# Patient Record
Sex: FEMALE | Race: WHITE | NOT HISPANIC OR LATINO | Employment: OTHER | URBAN - NONMETROPOLITAN AREA
[De-identification: names, ages, dates, MRNs, and addresses within clinical notes are randomized per-mention and may not be internally consistent; named-entity substitution may affect disease eponyms.]

---

## 2017-01-11 RX ORDER — LISINOPRIL AND HYDROCHLOROTHIAZIDE 12.5; 1 MG/1; MG/1
TABLET ORAL
Qty: 90 TABLET | Refills: 2 | Status: SHIPPED | OUTPATIENT
Start: 2017-01-11 | End: 2019-11-12

## 2017-03-08 ENCOUNTER — OFFICE VISIT (OUTPATIENT)
Dept: CARDIOLOGY | Facility: CLINIC | Age: 67
End: 2017-03-08

## 2017-03-08 VITALS
BODY MASS INDEX: 31.85 KG/M2 | HEART RATE: 64 BPM | SYSTOLIC BLOOD PRESSURE: 110 MMHG | DIASTOLIC BLOOD PRESSURE: 76 MMHG | HEIGHT: 59 IN | WEIGHT: 158 LBS

## 2017-03-08 DIAGNOSIS — I10 ESSENTIAL HYPERTENSION: ICD-10-CM

## 2017-03-08 DIAGNOSIS — I25.10 ATHEROSCLEROSIS OF NATIVE CORONARY ARTERY OF NATIVE HEART WITHOUT ANGINA PECTORIS: Primary | ICD-10-CM

## 2017-03-08 DIAGNOSIS — I25.10 CORONARY ARTERY DISEASE INVOLVING NATIVE CORONARY ARTERY OF NATIVE HEART WITHOUT ANGINA PECTORIS: ICD-10-CM

## 2017-03-08 PROCEDURE — 99214 OFFICE O/P EST MOD 30 MIN: CPT | Performed by: INTERNAL MEDICINE

## 2017-03-08 PROCEDURE — 93000 ELECTROCARDIOGRAM COMPLETE: CPT | Performed by: INTERNAL MEDICINE

## 2017-03-08 RX ORDER — CYANOCOBALAMIN (VITAMIN B-12) 1000 MCG
1 TABLET, SUBLINGUAL SUBLINGUAL DAILY
COMMUNITY
End: 2021-01-08 | Stop reason: ALTCHOICE

## 2017-03-08 NOTE — PROGRESS NOTES
vAe Ramirez  67 y.o. female    03/08/2017  1. Atherosclerosis of native coronary artery of native heart without angina pectoris    2. Coronary artery disease involving native coronary artery of native heart without angina pectoris    3. Essential hypertension        History of Present Illness  Mrs. Ramirez is here for follow-up of her above stated problems.  Her predominant complaint is fluctuating blood pressure systolics at times up to 170 and sometimes down to 80.  He feels weak and dizzy when the blood pressure is low and she denied any chest pain or shortness of breath and clinical exam today did not reveal any signs of bronchospasm or congestive heart failure.  Blood pressure was 110/76 minute is a mercury today.    EKG showed sinus rhythm with leftward axis with mild IVCD and nonspecific T-wave changes.  On further questioning I note that she she wakes up very early in the mornings about 3:30 AM and takes the Lisinopril/HCTZ at about 4:30 AM and Imdur and Toprol-XL in p.m. There is no definite pattern to her blood pressure fluctuation.  No gastrointestinal or urinary symptoms were noted.  Anxiety seems to be a significant contribution factor.          SUBJECTIVE    Allergies   Allergen Reactions   • Penicillins    • Plavix [Clopidogrel]          Past Medical History   Diagnosis Date   • Acute angina    • Acute bronchitis    • Acute sinusitis    • Anxiety    • Atherosclerotic heart disease of native coronary artery without angina pectoris    • Borderline glaucoma    • CAD (coronary artery disease)    • Cough    • Depression    • Disease of gallbladder      s/p lap jocelyne and normal ioc      • Encounter for gynecological examination (general) (routine) without abnormal findings    • Encounter for screening mammogram for malignant neoplasm of breast    • History of echocardiogram 12/12/2014     Normal LV systolic funciton with Ef of 55% with diastolic relaxation abnormality of the left ventricle. Mild  mitral regurgitation.   • Hyperlipidemia    • Hypertension    • Hypothyroidism    • Keratoconjunctivitis sicca    • Myopia      refractive change OD    • Nausea and vomiting    • Nuclear cataract    • PVD (peripheral vascular disease)    • SOB (shortness of breath)    • Upper respiratory infection          Past Surgical History   Procedure Laterality Date   • Cardiac catheterization  2014     Medically manageable coronary artery disease in the previously placed stent in the diagonal coronary artery was patent with liminal irregularities. Normal LV systolic function with no wall motion abnormalities   • Cholecystectomy  2012     intraoperative cholangiogram. Cholecystitis & cholelithiasis   • Mammo bilateral  2016     DIAG MAMM, BILAT DIGITAL  (Medicare) (Other abnormal and inconclusive findings on diagnostic imaging of breast)    • Other surgical history  2003     OCT DISC NFL 69813 (Borderline glaucoma)    • Mammo bilateral  2016     SCREENING MAMMOGRAPHY DIGITAL  (Medicare) (Encounter for screening mammogram for malignant neoplasm of breast)    • Tonsillectomy     •  section     • Cardiac catheterization     • Coronary stent placement       [facility]         No family history on file.      Social History     Social History   • Marital status:      Spouse name: N/A   • Number of children: N/A   • Years of education: N/A     Occupational History   • Not on file.     Social History Main Topics   • Smoking status: Current Every Day Smoker   • Smokeless tobacco: Never Used   • Alcohol use No   • Drug use: No   • Sexual activity: Defer     Other Topics Concern   • Not on file     Social History Narrative         Current Outpatient Prescriptions   Medication Sig Dispense Refill   • aspirin 325 MG tablet Take 325 mg by mouth Daily.     • atorvastatin (LIPITOR) 20 MG tablet Take 20 mg by mouth Daily.     • Calcium Carbonate-Vitamin D (CALCIUM-VITAMIN D3 PO) Take  by  "mouth.     • Cholecalciferol (VITAMIN D3) 2000 UNITS tablet Take 1 tablet by mouth Daily.     • Cyanocobalamin (VITAMIN B-12) 500 MCG sublingual tablet Place 1 tablet under the tongue Daily.     • diazepam (VALIUM) 5 MG tablet Take 5 mg by mouth Every 12 (Twelve) Hours As Needed for Anxiety.     • docusate sodium (COLACE) 100 MG capsule Take 100 mg by mouth Daily.     • escitalopram (LEXAPRO) 20 MG tablet Take 20 mg by mouth Every Night.     • furosemide (LASIX) 20 MG tablet Take 20 mg by mouth As Needed.     • isosorbide mononitrate (IMDUR) 30 MG 24 hr tablet Take 1 tablet by mouth  every day 90 tablet 3   • [START ON 4/11/2106] levothyroxine (SYNTHROID, LEVOTHROID) 75 MCG tablet Take 75 mcg by mouth Daily.     • lisinopril-hydrochlorothiazide (PRINZIDE,ZESTORETIC) 10-12.5 MG per tablet Take 1 tablet by mouth  every day 90 tablet 2   • metoprolol succinate XL (TOPROL-XL) 50 MG 24 hr tablet Take 50 mg by mouth Every Night.     • nabumetone (RELAFEN) 750 MG tablet        No current facility-administered medications for this visit.          OBJECTIVE    Visit Vitals   • /76   • Pulse 64   • Ht 59\" (149.9 cm)   • Wt 158 lb (71.7 kg)   • BMI 31.91 kg/m2           Review of Systems     Constitutional:  Denies recent weight loss, weight gain, fever or chills, no change in exercise tolerance     HENT:  Denies any hearing loss, epistaxis, hoarseness, or difficulty speaking.     Eyes: Wears eyeglasses or contact lenses     Respiratory:  Denies dyspnea with exertion,no cough, wheezing, or hemoptysis.     Cardiovascular: No chest pain or palpitation . No orthopnea, PND, peripheral edema, syncope, or claudication.     Gastrointestinal:  Denies change in bowel habits, dyspepsia, ulcer disease, hematochezia, or melena.     Endocrine: Negative for cold intolerance, heat intolerance, polydipsia, polyphagia and polyuria. Denies any history of weight change, heat/cold intolerance, polydipsia, polyuria     Genitourinary: " Negative.      Musculoskeletal: Denies any history of arthritic symptoms or back problems     Skin:  Denies any change in hair or nails, rashes, or skin lesions.       Physical Exam     Constitutional: Cooperative, alert and oriented, in no acute distress. Anxious    HENT:   Head: Normocephalic, normal hair patterns, no masses or tenderness.  Ears, Nose, and Throat: No gross abnormalities. No pallor or cyanosis. Dentition good.   Eyes: EOMS intact, PERRL, conjunctivae and lids unremarkable. Fundoscopic exam and visual fields not performed.   Neck: No palpable masses or adenopathy, no thyromegaly, no JVD, carotid pulses are full and equal bilaterally and without  Bruits.     Cardiovascular: Regular rhythm, S1 and S2 normal, no S3 or S4. Apical impulse not displaced. 2/6 systolic murmur, No gallops, or rubs detected.     Pulmonary/Chest: Chest: Increased AP diameter of the chest, no tenderness to palpation, normal respiratory excursion, no intercostal retraction, no use of accessory muscles.            Pulmonary: Normal breath sounds. No rales or ronchi.    Abdominal: Abdomen soft, bowel sounds normoactive, no masses, no hepatosplenomegaly, non-tender, no bruits.     Musculoskeletal: No deformities, clubbing, cyanosis, erythema, or edema observed. There are no spinal abnormalities noted. Normal muscle strength and tone. Pulses full and equal in all extremities, no bruits auscultated.     Neurological: No gross motor or sensory deficits noted, affect appropriate, oriented to time, person, place.     Skin: Warm and dry to the touch, no apparent skin lesions or masses noted.     Psychiatric:  She is anxious          ECG 12 Lead  Date/Time: 3/8/2017 12:02 PM  Performed by: NAHID SERRANO  Authorized by: NAHID SERRANO   Comparison: not compared with previous ECG   Rhythm: sinus rhythm  Conduction: non-specific intraventricular conduction delay  Comments: EKG showed sinus rhythm heart rate of 64 bpm  with mild IVCD.  Leftward axis.  Nonspecific T-wave changes were noted.              Lab Results   Component Value Date    WBC 7.9 12/12/2014    HGB 14.5 12/12/2014    HCT 43.2 12/12/2014    MCV 88.0 12/12/2014     12/12/2014     Lab Results   Component Value Date    GLUCOSE 103 (H) 12/12/2014    BUN 12 12/12/2014    CREATININE 0.8 12/12/2014    CO2 31 12/12/2014    CALCIUM 9.2 12/12/2014    ALBUMIN 3.8 12/12/2014    AST 22 12/12/2014    ALT 30 12/12/2014     No results found for: CHOL  Lab Results   Component Value Date    TRIG 168 12/12/2014     Lab Results   Component Value Date    HDL 33 (L) 12/12/2014     Lab Results   Component Value Date    LDLCALC 68 12/12/2014     No results found for: LDL  No results found for: HDLLDLRATIO  No components found for: CHOLHDL  Lab Results   Component Value Date    HGBA1C 5.5 12/11/2014     Lab Results   Component Value Date    TSH 2.41 12/11/2014           ASSESSMENT AND PLAN  Mrs. Ramirez is stable as regards to her coronary artery disease with no evidence of angina, arrhythmia or congestive heart failure.  Her blood pressure however has been fluctuating as described above.  I've advised her to take lisinopril and HCTZ along with isosorbide mononitrate in a.m. and Toprol-XL in p.m. Hopefully, this might regulate her blood pressure better.  If not, I'll consider changing Toprol-XL in p.m. to Bystolic 5 mg in p.m. unfortunately she continues to smoke and smoking cessation was once again stressed.  Antiplatelet therapy with aspirin and lipid-lowering therapy with atorvastatin has been continued.  She had several questions with regards to her medications and these were discussed in detail.    Diagnoses and all orders for this visit:    Atherosclerosis of native coronary artery of native heart without angina pectoris    Coronary artery disease involving native coronary artery of native heart without angina pectoris    Essential hypertension        Darrius Molina,  MD  3/8/2017  12:02 PM

## 2017-09-12 ENCOUNTER — OFFICE VISIT (OUTPATIENT)
Dept: CARDIOLOGY | Facility: CLINIC | Age: 67
End: 2017-09-12

## 2017-09-12 VITALS
HEIGHT: 59 IN | WEIGHT: 158 LBS | DIASTOLIC BLOOD PRESSURE: 70 MMHG | HEART RATE: 64 BPM | SYSTOLIC BLOOD PRESSURE: 118 MMHG | BODY MASS INDEX: 31.85 KG/M2

## 2017-09-12 DIAGNOSIS — I10 ESSENTIAL HYPERTENSION: ICD-10-CM

## 2017-09-12 DIAGNOSIS — I25.10 CORONARY ARTERY DISEASE INVOLVING NATIVE CORONARY ARTERY OF NATIVE HEART WITHOUT ANGINA PECTORIS: ICD-10-CM

## 2017-09-12 DIAGNOSIS — I25.10 ATHEROSCLEROSIS OF NATIVE CORONARY ARTERY OF NATIVE HEART WITHOUT ANGINA PECTORIS: Primary | ICD-10-CM

## 2017-09-12 PROCEDURE — 99213 OFFICE O/P EST LOW 20 MIN: CPT | Performed by: INTERNAL MEDICINE

## 2017-09-12 RX ORDER — OMEPRAZOLE 40 MG/1
40 CAPSULE, DELAYED RELEASE ORAL DAILY
COMMUNITY

## 2017-09-12 RX ORDER — TRAMADOL HYDROCHLORIDE 50 MG/1
50 TABLET ORAL 3 TIMES DAILY PRN
COMMUNITY
End: 2021-01-08 | Stop reason: ALTCHOICE

## 2017-09-12 NOTE — PROGRESS NOTES
Ave Ramirez  67 y.o. female    09/12/2017  1. Atherosclerosis of native coronary artery of native heart without angina pectoris    2. Coronary artery disease involving native coronary artery of native heart without angina pectoris    3. Essential hypertension        History of Present Illness    Mrs. Ramirez is here for follow-up of her above stated problems.  She denied any chest pain, shortness of breath or palpitation.  Blood pressure was in the normal range.  She unfortunately continues to smoke and we had a discussion about smoking cessation.  She is down to 5 cigarettes a day.        SUBJECTIVE    Allergies   Allergen Reactions   • Penicillins    • Plavix [Clopidogrel]          Past Medical History:   Diagnosis Date   • Acute angina    • Acute bronchitis    • Acute sinusitis    • Anxiety    • Atherosclerotic heart disease of native coronary artery without angina pectoris    • Borderline glaucoma    • CAD (coronary artery disease)    • Cough    • Depression    • Disease of gallbladder     s/p lap jocelyne and normal ioc      • Encounter for gynecological examination (general) (routine) without abnormal findings    • Encounter for screening mammogram for malignant neoplasm of breast    • History of echocardiogram 12/12/2014    Normal LV systolic funciton with Ef of 55% with diastolic relaxation abnormality of the left ventricle. Mild mitral regurgitation.   • Hyperlipidemia    • Hypertension    • Hypothyroidism    • Keratoconjunctivitis sicca    • Myopia     refractive change OD    • Nausea and vomiting    • Nuclear cataract    • PVD (peripheral vascular disease)    • SOB (shortness of breath)    • Upper respiratory infection          Past Surgical History:   Procedure Laterality Date   • CARDIAC CATHETERIZATION  12/12/2014    Medically manageable coronary artery disease in the previously placed stent in the diagonal coronary artery was patent with liminal irregularities. Normal LV systolic function with no  wall motion abnormalities   • CARDIAC CATHETERIZATION     •  SECTION     • CHOLECYSTECTOMY  2012    intraoperative cholangiogram. Cholecystitis & cholelithiasis   • CORONARY STENT PLACEMENT      [facility]   • MAMMO BILATERAL  2016    DIAG MAMM, BILAT DIGITAL  (Medicare) (Other abnormal and inconclusive findings on diagnostic imaging of breast)    • MAMMO BILATERAL  2016    SCREENING MAMMOGRAPHY DIGITAL  (Medicare) (Encounter for screening mammogram for malignant neoplasm of breast)    • OTHER SURGICAL HISTORY  2003    OCT DISC NFL 75569 (Borderline glaucoma)    • TONSILLECTOMY           No family history on file.      Social History     Social History   • Marital status:      Spouse name: N/A   • Number of children: N/A   • Years of education: N/A     Occupational History   • Not on file.     Social History Main Topics   • Smoking status: Current Every Day Smoker   • Smokeless tobacco: Never Used   • Alcohol use No   • Drug use: No   • Sexual activity: Defer     Other Topics Concern   • Not on file     Social History Narrative         Current Outpatient Prescriptions   Medication Sig Dispense Refill   • aspirin 325 MG tablet Take 325 mg by mouth Daily.     • atorvastatin (LIPITOR) 20 MG tablet Take 20 mg by mouth Daily.     • Cholecalciferol (VITAMIN D3) 2000 UNITS tablet Take 1 tablet by mouth Daily.     • Cyanocobalamin (VITAMIN B-12) 500 MCG sublingual tablet Place 1 tablet under the tongue Daily.     • diazepam (VALIUM) 5 MG tablet Take 5 mg by mouth Every 12 (Twelve) Hours As Needed for Anxiety.     • docusate sodium (COLACE) 100 MG capsule Take 100 mg by mouth Daily.     • escitalopram (LEXAPRO) 20 MG tablet Take 20 mg by mouth Every Night.     • furosemide (LASIX) 20 MG tablet Take 20 mg by mouth As Needed.     • isosorbide mononitrate (IMDUR) 30 MG 24 hr tablet Take 1 tablet by mouth  every day 90 tablet 3   • [START ON 2106] levothyroxine (SYNTHROID,  "LEVOTHROID) 75 MCG tablet Take 75 mcg by mouth Daily.     • lisinopril-hydrochlorothiazide (PRINZIDE,ZESTORETIC) 10-12.5 MG per tablet Take 1 tablet by mouth  every day 90 tablet 2   • metoprolol succinate XL (TOPROL-XL) 50 MG 24 hr tablet Take 50 mg by mouth Every Night.     • omeprazole (priLOSEC) 40 MG capsule Take 40 mg by mouth Daily.     • traMADol (ULTRAM) 50 MG tablet Take 50 mg by mouth 3 (Three) Times a Day As Needed for Moderate Pain .       No current facility-administered medications for this visit.          OBJECTIVE    /70  Pulse 64  Ht 59\" (149.9 cm)  Wt 158 lb (71.7 kg)  BMI 31.91 kg/m2        Review of Systems     Constitutional:  Denies recent weight loss, weight gain, fever or chills, no change in exercise tolerance     HENT:  Denies any hearing loss, epistaxis, hoarseness, or difficulty speaking.     Eyes: Wears eyeglasses or contact lenses     Respiratory:  Denies dyspnea with exertion,no cough, wheezing, or hemoptysis.     Cardiovascular: Negative for palpations, chest pain, orthopnea, PND, peripheral edema, syncope, or claudication.     Gastrointestinal:  Denies change in bowel habits, dyspepsia, ulcer disease, hematochezia, or melena.     Endocrine: Negative for cold intolerance, heat intolerance, polydipsia, polyphagia and polyuria. Denies any history of weight change, heat/cold intolerance, polydipsia, polyuria     Genitourinary: Negative.      Musculoskeletal: Denies any history of arthritic symptoms or back problems .Has tingling in her feet from time to time    Skin:  Denies any change in hair or nails, rashes, or skin lesions.     Allergic/Immunologic: Negative.  Negative for environmental allergies, food allergies and immunocompromised state.     Neurological:  Denies any history of recurrent headaches, strokes, TIA, or seizure disorder.     Hematological: Denies any food allergies, seasonal allergies, bleeding disorders, or lymphadenopathy.       Physical Exam "     Constitutional: Cooperative, alert and oriented, well-developed, well-nourished, in no acute distress.     HENT:   Head: Normocephalic, normal hair patterns, no masses or tenderness.  Ears, Nose, and Throat: No gross abnormalities. No pallor or cyanosis.  Eyes: EOMS intact, PERRL, conjunctivae and lids unremarkable. Fundoscopic exam and visual fields not performed.   Neck: No palpable masses or adenopathy, no thyromegaly, no JVD, carotid pulses are full and equal bilaterally and without  Bruits.     Cardiovascular: Regular rhythm, S1 and S2 normal, no S3 or S4.  No murmurs, gallops, or rubs detected.     Pulmonary/Chest: Chest: normal symmetry, no tenderness to palpation, normal respiratory excursion,  no use of accessory muscles.            Pulmonary: Normal breath sounds. No rales or ronchi.    Abdominal: Abdomen soft, bowel sounds normoactive, no masses, no hepatosplenomegaly, non-tender, no bruits.     Musculoskeletal: No deformities, clubbing, cyanosis, erythema, or edema observed. Normal muscle strength and tone. Pulses full and equal in all extremities, no bruits auscultated.     Neurological: No gross motor or sensory deficits noted, affect appropriate, oriented to time, person, place.     Skin: Warm and dry to the touch, no apparent skin lesions or masses noted.     Psychiatric: She has a normal mood and affect. Her behavior is normal. Judgment and thought content normal.         Procedures      Lab Results   Component Value Date    WBC 7.9 12/12/2014    HGB 14.5 12/12/2014    HCT 43.2 12/12/2014    MCV 88.0 12/12/2014     12/12/2014     Lab Results   Component Value Date    GLUCOSE 103 (H) 12/12/2014    BUN 12 12/12/2014    CREATININE 0.8 12/12/2014    CO2 31 12/12/2014    CALCIUM 9.2 12/12/2014    ALBUMIN 3.8 12/12/2014    AST 22 12/12/2014    ALT 30 12/12/2014     No results found for: CHOL  Lab Results   Component Value Date    TRIG 168 12/12/2014     Lab Results   Component Value Date    HDL  33 (L) 12/12/2014     Lab Results   Component Value Date    LDLCALC 68 12/12/2014     No results found for: LDL  No results found for: HDLLDLRATIO  No components found for: CHOLHDL  Lab Results   Component Value Date    HGBA1C 5.5 12/11/2014     Lab Results   Component Value Date    TSH 2.41 12/11/2014           ASSESSMENT AND PLAN  Mrs. Ramirez is stable as regards to her heart with no evidence of progression of coronary artery disease.  Tobacco cessation was stressed.  She will have lab work done by Dr. Sheppard, and a copy of this will be obtained for our records.  Antiplatelet therapy with aspirin, antianginal therapy with isosorbide mononitrate, antihypertensive therapy with lisinopril HCTZ and Toprol-XL and lipid-lowering therapy with atorvastatin have been continued.  If her tingling and numbness in the legs do not improve an STEVIE could be considered.    Diagnoses and all orders for this visit:    Atherosclerosis of native coronary artery of native heart without angina pectoris    Coronary artery disease involving native coronary artery of native heart without angina pectoris    Essential hypertension        Darrius Molina MD  9/12/2017  10:55 AM

## 2019-11-12 ENCOUNTER — OFFICE VISIT (OUTPATIENT)
Dept: CARDIOLOGY | Facility: CLINIC | Age: 69
End: 2019-11-12

## 2019-11-12 ENCOUNTER — LAB (OUTPATIENT)
Dept: LAB | Facility: HOSPITAL | Age: 69
End: 2019-11-12

## 2019-11-12 VITALS
BODY MASS INDEX: 31.25 KG/M2 | HEART RATE: 68 BPM | SYSTOLIC BLOOD PRESSURE: 120 MMHG | HEIGHT: 59 IN | OXYGEN SATURATION: 98 % | WEIGHT: 155 LBS | DIASTOLIC BLOOD PRESSURE: 72 MMHG

## 2019-11-12 DIAGNOSIS — R07.2 PRECORDIAL PAIN: ICD-10-CM

## 2019-11-12 DIAGNOSIS — I10 ESSENTIAL HYPERTENSION: ICD-10-CM

## 2019-11-12 DIAGNOSIS — R00.2 PALPITATIONS: ICD-10-CM

## 2019-11-12 DIAGNOSIS — I73.9 PVD (PERIPHERAL VASCULAR DISEASE) (HCC): ICD-10-CM

## 2019-11-12 DIAGNOSIS — R06.09 DYSPNEA ON EXERTION: ICD-10-CM

## 2019-11-12 DIAGNOSIS — I25.10 CORONARY ARTERY DISEASE INVOLVING NATIVE CORONARY ARTERY OF NATIVE HEART WITHOUT ANGINA PECTORIS: ICD-10-CM

## 2019-11-12 DIAGNOSIS — I25.10 CORONARY ARTERY DISEASE INVOLVING NATIVE CORONARY ARTERY OF NATIVE HEART WITHOUT ANGINA PECTORIS: Primary | ICD-10-CM

## 2019-11-12 DIAGNOSIS — I73.9 PVD (PERIPHERAL VASCULAR DISEASE) (HCC): Primary | ICD-10-CM

## 2019-11-12 LAB
ALBUMIN SERPL-MCNC: 4.1 G/DL (ref 3.5–5.2)
ALBUMIN/GLOB SERPL: 1.2 G/DL
ALP SERPL-CCNC: 149 U/L (ref 39–117)
ALT SERPL W P-5'-P-CCNC: 28 U/L (ref 1–33)
ANION GAP SERPL CALCULATED.3IONS-SCNC: 12.3 MMOL/L (ref 5–15)
AST SERPL-CCNC: 31 U/L (ref 1–32)
BASOPHILS # BLD AUTO: 0.06 10*3/MM3 (ref 0–0.2)
BASOPHILS NFR BLD AUTO: 0.6 % (ref 0–1.5)
BILIRUB SERPL-MCNC: 0.5 MG/DL (ref 0.2–1.2)
BUN BLD-MCNC: 8 MG/DL (ref 8–23)
BUN/CREAT SERPL: 10.7 (ref 7–25)
CALCIUM SPEC-SCNC: 9.3 MG/DL (ref 8.6–10.5)
CHLORIDE SERPL-SCNC: 100 MMOL/L (ref 98–107)
CHOLEST SERPL-MCNC: 181 MG/DL (ref 0–200)
CO2 SERPL-SCNC: 29.7 MMOL/L (ref 22–29)
CREAT BLD-MCNC: 0.75 MG/DL (ref 0.57–1)
DEPRECATED RDW RBC AUTO: 42.3 FL (ref 37–54)
EOSINOPHIL # BLD AUTO: 0.45 10*3/MM3 (ref 0–0.4)
EOSINOPHIL NFR BLD AUTO: 4.3 % (ref 0.3–6.2)
ERYTHROCYTE [DISTWIDTH] IN BLOOD BY AUTOMATED COUNT: 13.9 % (ref 12.3–15.4)
GFR SERPL CREATININE-BSD FRML MDRD: 77 ML/MIN/1.73
GLOBULIN UR ELPH-MCNC: 3.3 GM/DL
GLUCOSE BLD-MCNC: 97 MG/DL (ref 65–99)
HCT VFR BLD AUTO: 45.1 % (ref 34–46.6)
HDLC SERPL-MCNC: 32 MG/DL (ref 40–60)
HGB BLD-MCNC: 14.8 G/DL (ref 12–15.9)
IMM GRANULOCYTES # BLD AUTO: 0.05 10*3/MM3 (ref 0–0.05)
IMM GRANULOCYTES NFR BLD AUTO: 0.5 % (ref 0–0.5)
LDLC SERPL CALC-MCNC: 95 MG/DL (ref 0–100)
LDLC/HDLC SERPL: 2.96 {RATIO}
LYMPHOCYTES # BLD AUTO: 2.87 10*3/MM3 (ref 0.7–3.1)
LYMPHOCYTES NFR BLD AUTO: 27.3 % (ref 19.6–45.3)
MCH RBC QN AUTO: 27.5 PG (ref 26.6–33)
MCHC RBC AUTO-ENTMCNC: 32.8 G/DL (ref 31.5–35.7)
MCV RBC AUTO: 83.8 FL (ref 79–97)
MONOCYTES # BLD AUTO: 0.73 10*3/MM3 (ref 0.1–0.9)
MONOCYTES NFR BLD AUTO: 6.9 % (ref 5–12)
NEUTROPHILS # BLD AUTO: 6.36 10*3/MM3 (ref 1.7–7)
NEUTROPHILS NFR BLD AUTO: 60.4 % (ref 42.7–76)
NRBC BLD AUTO-RTO: 0 /100 WBC (ref 0–0.2)
PLATELET # BLD AUTO: 144 10*3/MM3 (ref 140–450)
PMV BLD AUTO: 10.7 FL (ref 6–12)
POTASSIUM BLD-SCNC: 3.9 MMOL/L (ref 3.5–5.2)
PROT SERPL-MCNC: 7.4 G/DL (ref 6–8.5)
RBC # BLD AUTO: 5.38 10*6/MM3 (ref 3.77–5.28)
SODIUM BLD-SCNC: 142 MMOL/L (ref 136–145)
T4 FREE SERPL-MCNC: 1.38 NG/DL (ref 0.93–1.7)
TRIGL SERPL-MCNC: 271 MG/DL (ref 0–150)
TSH SERPL DL<=0.05 MIU/L-ACNC: 0.02 UIU/ML (ref 0.27–4.2)
VLDLC SERPL-MCNC: 54.2 MG/DL (ref 5–40)
WBC NRBC COR # BLD: 10.52 10*3/MM3 (ref 3.4–10.8)

## 2019-11-12 PROCEDURE — 99215 OFFICE O/P EST HI 40 MIN: CPT | Performed by: INTERNAL MEDICINE

## 2019-11-12 PROCEDURE — 84439 ASSAY OF FREE THYROXINE: CPT

## 2019-11-12 PROCEDURE — 85025 COMPLETE CBC W/AUTO DIFF WBC: CPT

## 2019-11-12 PROCEDURE — 36415 COLL VENOUS BLD VENIPUNCTURE: CPT

## 2019-11-12 PROCEDURE — 80053 COMPREHEN METABOLIC PANEL: CPT

## 2019-11-12 PROCEDURE — 93000 ELECTROCARDIOGRAM COMPLETE: CPT | Performed by: INTERNAL MEDICINE

## 2019-11-12 PROCEDURE — 84443 ASSAY THYROID STIM HORMONE: CPT

## 2019-11-12 PROCEDURE — 80061 LIPID PANEL: CPT

## 2019-11-12 RX ORDER — TIZANIDINE 4 MG/1
4 TABLET ORAL NIGHTLY PRN
COMMUNITY

## 2019-11-12 RX ORDER — GABAPENTIN 100 MG/1
400 CAPSULE ORAL 3 TIMES DAILY
COMMUNITY

## 2019-11-12 RX ORDER — LOSARTAN POTASSIUM 50 MG/1
50 TABLET ORAL DAILY
Qty: 30 TABLET | Refills: 6 | Status: SHIPPED | OUTPATIENT
Start: 2019-11-12 | End: 2019-12-06 | Stop reason: SDUPTHER

## 2019-11-12 RX ORDER — ASPIRIN 81 MG/1
81 TABLET ORAL DAILY
Qty: 30 TABLET | Refills: 6
Start: 2019-11-12

## 2019-11-12 RX ORDER — HYDROXYZINE HYDROCHLORIDE 25 MG/1
25 TABLET, FILM COATED ORAL 3 TIMES DAILY PRN
COMMUNITY

## 2019-11-12 NOTE — PROGRESS NOTES
Ave Ramirez  69 y.o. female    11/12/2019  1. Coronary artery disease involving native coronary artery of native heart without angina pectoris    2. Essential hypertension    3. PVD (peripheral vascular disease) (CMS/HCC)    4. Dyspnea on exertion    5. Precordial pain    6. Palpitations        History of Present Illness  Ms. Ramirez is a 69-year-old  lady with multiple risk factors for coronary artery disease including hypertension,hyperlipidemia, tobacco abuse, and documented coronary artery disease with previous intervention to the diagonal coronary artery back in 2003  and peripheral vascular intervention.  Last cardiac catheterization was in 2014 which showed patent stent in the diagonal coronary artery and noncritical CAD.  She is being seen in our office after a long interval.  When seen in September 2017 she seemed to be progressing quite well.  She presents for evaluation of multiple complaints including intermittent chest pressure/back pain, shortness of breath on exertion, palpitation which have all been going on for the past several weeks.  She has noted widely fluctuating blood pressures and her blood pressure seems to be elevated during the night when she is at work.  Her fluttering occurs at different times and there is no definite pattern and her dyspnea is NYHA class II with no PND or orthopnea.  EKG today showed sinus rhythm with heart rate of 66 bpm.  Poor R wave progression anteroseptal leads.  IVCD of LBBB type.  Nonspecific ST-T changes.  Findings unchanged from previous EKGs.    He has been compliant with her medications except lisinopril HCTZ which was stopped secondary to cough.  She has been under stress secondary to illness of her  and this was the reason she did not keep her appointments.    SUBJECTIVE    Allergies   Allergen Reactions   • Penicillins    • Plavix [Clopidogrel]          Past Medical History:   Diagnosis Date   • Acute angina (CMS/HCC)    • Acute  bronchitis    • Acute sinusitis    • Anxiety    • Atherosclerotic heart disease of native coronary artery without angina pectoris    • Borderline glaucoma    • CAD (coronary artery disease)    • Cough    • Depression    • Disease of gallbladder     s/p lap jocelyne and normal ioc      • Encounter for gynecological examination (general) (routine) without abnormal findings    • Encounter for screening mammogram for malignant neoplasm of breast    • History of echocardiogram 2014    Normal LV systolic funciton with Ef of 55% with diastolic relaxation abnormality of the left ventricle. Mild mitral regurgitation.   • Hyperlipidemia    • Hypertension    • Hypothyroidism    • Keratoconjunctivitis sicca (CMS/HCC)    • Myopia     refractive change OD    • Nausea and vomiting    • Nuclear cataract    • PVD (peripheral vascular disease) (CMS/HCC)    • SOB (shortness of breath)    • Upper respiratory infection          Past Surgical History:   Procedure Laterality Date   • CARDIAC CATHETERIZATION  2014    Medically manageable coronary artery disease in the previously placed stent in the diagonal coronary artery was patent with liminal irregularities. Normal LV systolic function with no wall motion abnormalities   • CARDIAC CATHETERIZATION     •  SECTION     • CHOLECYSTECTOMY  2012    intraoperative cholangiogram. Cholecystitis & cholelithiasis   • CORONARY STENT PLACEMENT      [facility]   • MAMMO BILATERAL  2016    DIAG MAMM, BILAT DIGITAL  (Medicare) (Other abnormal and inconclusive findings on diagnostic imaging of breast)    • MAMMO BILATERAL  2016    SCREENING MAMMOGRAPHY DIGITAL  (Medicare) (Encounter for screening mammogram for malignant neoplasm of breast)    • OTHER SURGICAL HISTORY  2003    OCT DISC [facility] 26712 (Borderline glaucoma)    • TONSILLECTOMY           History reviewed. No pertinent family history.      Social History     Socioeconomic History   • Marital  status:      Spouse name: Not on file   • Number of children: Not on file   • Years of education: Not on file   • Highest education level: Not on file   Tobacco Use   • Smoking status: Current Every Day Smoker   • Smokeless tobacco: Never Used   Substance and Sexual Activity   • Alcohol use: No   • Drug use: No   • Sexual activity: Defer         Current Outpatient Medications   Medication Sig Dispense Refill   • aspirin 325 MG tablet Take 325 mg by mouth Daily.     • atorvastatin (LIPITOR) 20 MG tablet Take 20 mg by mouth Daily.     • Cholecalciferol (VITAMIN D3) 2000 UNITS tablet Take 1 tablet by mouth Daily.     • Cyanocobalamin (VITAMIN B-12) 500 MCG sublingual tablet Place 1 tablet under the tongue Daily.     • diazepam (VALIUM) 5 MG tablet Take 5 mg by mouth Every 12 (Twelve) Hours As Needed for Anxiety.     • docusate sodium (COLACE) 100 MG capsule Take 100 mg by mouth Daily.     • escitalopram (LEXAPRO) 20 MG tablet Take 20 mg by mouth Every Night.     • gabapentin (NEURONTIN) 100 MG capsule Take 100 mg by mouth 3 (Three) Times a Day.     • hydrOXYzine (ATARAX) 25 MG tablet Take 25 mg by mouth 3 (Three) Times a Day As Needed for Itching.     • isosorbide mononitrate (IMDUR) 30 MG 24 hr tablet Take 1 tablet by mouth  every day 90 tablet 3   • [START ON 4/11/2106] levothyroxine (SYNTHROID, LEVOTHROID) 75 MCG tablet Take 75 mcg by mouth Daily.     • metoprolol succinate XL (TOPROL-XL) 50 MG 24 hr tablet Take 50 mg by mouth Every Night.     • omeprazole (priLOSEC) 40 MG capsule Take 40 mg by mouth Daily.     • tiZANidine (ZANAFLEX) 4 MG tablet Take 4 mg by mouth At Night As Needed for Muscle Spasms.     • traMADol (ULTRAM) 50 MG tablet Take 50 mg by mouth 3 (Three) Times a Day As Needed for Moderate Pain .     • furosemide (LASIX) 20 MG tablet Take 20 mg by mouth As Needed.     • losartan (COZAAR) 50 MG tablet Take 1 tablet by mouth Daily. Take in PM 30 tablet 6     No current facility-administered  "medications for this visit.          OBJECTIVE    /72   Pulse 68   Ht 149.9 cm (59\")   Wt 70.3 kg (155 lb)   SpO2 98%   BMI 31.31 kg/m²         Review of Systems     Constitutional:  Denies recent weight loss, weight gain, fever or chills     HENT:  Denies any hearing loss, epistaxis, hoarseness, or difficulty speaking.     Eyes: Wears eyeglasses or contact lenses     Respiratory:  Dyspnea with exertion,no cough, wheezing, or hemoptysis.     Cardiovascular: Negative for palpations, chest pain, dyspnea    Gastrointestinal:  Denies change in bowel habits, dyspepsia, ulcer disease, hematochezia, or melena.     Endocrine: Negative for cold intolerance, heat intolerance, polydipsia, polyphagia and polyuria.  History of hypothyroidism on Synthroid supplements    Genitourinary: Negative.      Musculoskeletal: Denies any history of arthritic symptoms or back problems     Skin:  Denies any change in hair or nails, rashes, or skin lesions.     Allergic/Immunologic: Negative.  Negative for environmental allergies, food allergies and immunocompromised state.     Neurological:  Denies any history of recurrent headaches, strokes, TIA, or seizure disorder.     Hematological: Denies any food allergies, seasonal allergies, bleeding disorders, or lymphadenopathy.     Psychiatric/Behavioral: Denies any history of depression, substance abuse, or change in cognitive function.         Physical Exam     Constitutional: Cooperative, alert and oriented,  in no acute distress.     HENT:   Head: Normocephalic, normal hair patterns, no masses or tenderness.  Ears, Nose, and Throat: No gross abnormalities. No pallor or cyanosis.   Eyes: EOMS intact, PERRL, conjunctivae and lids unremarkable. Fundoscopic exam and visual fields not performed.   Neck: No palpable masses or adenopathy, no thyromegaly, no JVD, carotid pulses are full and equal bilaterally and without  Bruits.     Cardiovascular: Regular rhythm, S1 and S2 normal, no S3 or " S4. No murmurs, gallops, or rubs detected.     Pulmonary/Chest: Chest: normal symmetry, , normal respiratory excursion, no intercostal retraction, no use of accessory muscles.            Pulmonary: Normal breath sounds. No rales or ronchi.    Abdominal: Abdomen soft, bowel sounds normoactive, no masses, no hepatosplenomegaly, non-tender, no bruits.     Musculoskeletal: No deformities, clubbing, cyanosis, erythema, or edema observed.     Neurological: No gross motor or sensory deficits noted, affect appropriate, oriented to time, person, place.     Skin: Warm and dry to the touch, no apparent skin lesions or masses noted.     Psychiatric: She has a normal mood and affect. Her behavior is normal. Judgment and thought content normal.         Procedures      Lab Results   Component Value Date    WBC 10.52 11/12/2019    HGB 14.8 11/12/2019    HCT 45.1 11/12/2019    MCV 83.8 11/12/2019     11/12/2019     Lab Results   Component Value Date    GLUCOSE 103 (H) 12/12/2014    BUN 13 12/23/2018    CREATININE 0.9 12/23/2018    CO2 28 12/23/2018    CALCIUM 9.0 12/23/2018    ALBUMIN 3.9 12/23/2018    LABIL2 1.3 12/23/2018    AST 19 12/23/2018    ALT 18 12/23/2018     No results found for: CHOL  Lab Results   Component Value Date    TRIG 168 12/12/2014     Lab Results   Component Value Date    HDL 33 (L) 12/12/2014     No components found for: LDLCALC  Lab Results   Component Value Date    LDL 68 12/12/2014     No results found for: HDLLDLRATIO  No components found for: CHOLHDL  Lab Results   Component Value Date    HGBA1C 5.5 12/11/2014     Lab Results   Component Value Date    TSH 2.41 12/11/2014           ASSESSMENT AND PLAN  Ave Ramirez is a 69-year-old female with multiple medical issues as discussed under history of present illness including history of coronary artery disease, peripheral vascular disease, hypertension, hyperlipidemia, hypothyroidism who was presented with multiple symptomatology including chest pain,  dyspnea, palpitation with no new EKG changes.  Her blood pressure was in the normal range today.  No signs of congestive heart failure was noted.  To further evaluate her symptoms and echocardiogram to assess left ventricular and valvular function, Holter monitoring to rule out cardiac arrhythmia and a Lexiscan Cardiolite stress test to assess myocardial perfusion is being arranged.  Her symptoms may be partly contributed by suppressed TSH which has been noted in the past.  Lab tests indicated below have been ordered.  For optimization of blood pressure I have added losartan 50 mg p.m. in addition to isosorbide mononitrate and metoprolol succinate in a.m. lipid-lowering therapy with atorvastatin has been continued.  I have taken aspirin to 81 mg daily and low-dose diuretics have been continued.  Further recommendations will follow.  Patient unfortunately continues to smoke and smoking cessation was stressed.    About 40 minutes was spent in the assessment and evaluation of this patient      Ave was seen today for follow-up and shortness of breath.    Diagnoses and all orders for this visit:    Coronary artery disease involving native coronary artery of native heart without angina pectoris  -     Adult Transthoracic Echo Complete W/ Cont if Necessary Per Protocol; Future  -     Stress Test With Myocardial Perfusion One Day; Future  -     Holter Monitor - 48 Hour; Future  -     Lipid Panel; Future  -     CBC & Differential; Future  -     Comprehensive Metabolic Panel; Future  -     T4, Free; Future  -     TSH; Future    Essential hypertension  -     Adult Transthoracic Echo Complete W/ Cont if Necessary Per Protocol; Future  -     Stress Test With Myocardial Perfusion One Day; Future  -     Holter Monitor - 48 Hour; Future  -     Lipid Panel; Future  -     CBC & Differential; Future  -     Comprehensive Metabolic Panel; Future  -     T4, Free; Future  -     TSH; Future    PVD (peripheral vascular disease)  (CMS/HCC)  -     Adult Transthoracic Echo Complete W/ Cont if Necessary Per Protocol; Future  -     Stress Test With Myocardial Perfusion One Day; Future  -     Holter Monitor - 48 Hour; Future  -     Lipid Panel; Future  -     CBC & Differential; Future  -     Comprehensive Metabolic Panel; Future  -     T4, Free; Future  -     TSH; Future    Dyspnea on exertion  -     Adult Transthoracic Echo Complete W/ Cont if Necessary Per Protocol; Future  -     Stress Test With Myocardial Perfusion One Day; Future  -     Holter Monitor - 48 Hour; Future  -     Lipid Panel; Future  -     CBC & Differential; Future  -     Comprehensive Metabolic Panel; Future  -     T4, Free; Future  -     TSH; Future    Precordial pain  -     Adult Transthoracic Echo Complete W/ Cont if Necessary Per Protocol; Future  -     Stress Test With Myocardial Perfusion One Day; Future  -     Holter Monitor - 48 Hour; Future  -     Lipid Panel; Future  -     CBC & Differential; Future  -     Comprehensive Metabolic Panel; Future  -     T4, Free; Future  -     TSH; Future    Palpitations  -     Adult Transthoracic Echo Complete W/ Cont if Necessary Per Protocol; Future  -     Stress Test With Myocardial Perfusion One Day; Future  -     Holter Monitor - 48 Hour; Future  -     Lipid Panel; Future  -     CBC & Differential; Future  -     Comprehensive Metabolic Panel; Future  -     T4, Free; Future  -     TSH; Future    Other orders  -     losartan (COZAAR) 50 MG tablet; Take 1 tablet by mouth Daily. Take in PM        Patient's Body mass index is 31.31 kg/m². BMI is above normal parameters. Recommendations include: exercise counseling and nutrition counseling.      Ave Ramirez is a current cigarettes user.  She currently smokes 1 pack of cigarettes per day for a duration of 55 years. I have educated her on the risk of diseases from using tobacco products such as cancer, COPD and heart diease.       I spent 3  minutes counseling the  patient.          Darrius Molina MD  11/12/2019  3:44 PM

## 2019-11-12 NOTE — ADDENDUM NOTE
Addended by: NAHID SERRANO on: 11/12/2019 03:54 PM     Modules accepted: Orders    
denies pain/discomfort

## 2019-11-22 ENCOUNTER — HOSPITAL ENCOUNTER (OUTPATIENT)
Dept: NUCLEAR MEDICINE | Facility: HOSPITAL | Age: 69
Discharge: HOME | End: 2019-11-22

## 2019-11-22 ENCOUNTER — HOSPITAL ENCOUNTER (OUTPATIENT)
Dept: CARDIOLOGY | Facility: HOSPITAL | Age: 69
Discharge: HOME | End: 2019-11-22

## 2019-11-22 DIAGNOSIS — R06.09 DYSPNEA ON EXERTION: ICD-10-CM

## 2019-11-22 DIAGNOSIS — R07.2 PRECORDIAL PAIN: ICD-10-CM

## 2019-11-22 DIAGNOSIS — I10 ESSENTIAL HYPERTENSION: ICD-10-CM

## 2019-11-22 DIAGNOSIS — R00.2 PALPITATIONS: ICD-10-CM

## 2019-11-22 DIAGNOSIS — I25.10 CORONARY ARTERY DISEASE INVOLVING NATIVE CORONARY ARTERY OF NATIVE HEART WITHOUT ANGINA PECTORIS: ICD-10-CM

## 2019-11-22 DIAGNOSIS — I73.9 PVD (PERIPHERAL VASCULAR DISEASE) (HCC): ICD-10-CM

## 2019-11-22 LAB
BH CV STRESS BP STAGE 1: NORMAL
BH CV STRESS COMMENTS STAGE 1: NORMAL
BH CV STRESS DOSE REGADENOSON STAGE 1: 0.4
BH CV STRESS DURATION MIN STAGE 1: 0
BH CV STRESS DURATION SEC STAGE 1: 10
BH CV STRESS HR STAGE 1: 80
BH CV STRESS PROTOCOL 1: NORMAL
BH CV STRESS RECOVERY BP: NORMAL MMHG
BH CV STRESS RECOVERY HR: 64 BPM
BH CV STRESS STAGE 1: 1
LV EF NUC BP: 80 %
MAXIMAL PREDICTED HEART RATE: 152 BPM
PERCENT MAX PREDICTED HR: 52.63 %
STRESS BASELINE BP: NORMAL MMHG
STRESS BASELINE HR: 65 BPM
STRESS PERCENT HR: 62 %
STRESS POST ESTIMATED WORKLOAD: 1 METS
STRESS POST PEAK BP: NORMAL MMHG
STRESS POST PEAK HR: 80 BPM
STRESS TARGET HR: 129 BPM

## 2019-11-22 PROCEDURE — 78452 HT MUSCLE IMAGE SPECT MULT: CPT

## 2019-11-22 PROCEDURE — 0 TECHNETIUM SESTAMIBI: Performed by: INTERNAL MEDICINE

## 2019-11-22 PROCEDURE — 93018 CV STRESS TEST I&R ONLY: CPT | Performed by: INTERNAL MEDICINE

## 2019-11-22 PROCEDURE — A9500 TC99M SESTAMIBI: HCPCS | Performed by: INTERNAL MEDICINE

## 2019-11-22 PROCEDURE — 93016 CV STRESS TEST SUPVJ ONLY: CPT | Performed by: INTERNAL MEDICINE

## 2019-11-22 PROCEDURE — 78452 HT MUSCLE IMAGE SPECT MULT: CPT | Performed by: INTERNAL MEDICINE

## 2019-11-22 PROCEDURE — 25010000002 REGADENOSON 0.4 MG/5ML SOLUTION: Performed by: INTERNAL MEDICINE

## 2019-11-22 PROCEDURE — 93017 CV STRESS TEST TRACING ONLY: CPT

## 2019-11-22 RX ORDER — SODIUM CHLORIDE 0.9 % (FLUSH) 0.9 %
10 SYRINGE (ML) INJECTION ONCE
Status: COMPLETED | OUTPATIENT
Start: 2019-11-22 | End: 2019-11-22

## 2019-11-22 RX ADMIN — SODIUM CHLORIDE, PRESERVATIVE FREE 10 ML: 5 INJECTION INTRAVENOUS at 10:32

## 2019-11-22 RX ADMIN — TECHNETIUM TC 99M SESTAMIBI 1 DOSE: 1 INJECTION INTRAVENOUS at 10:33

## 2019-11-22 RX ADMIN — REGADENOSON 0.4 MG: 0.08 INJECTION, SOLUTION INTRAVENOUS at 10:32

## 2019-11-22 RX ADMIN — TECHNETIUM TC 99M SESTAMIBI 1 DOSE: 1 INJECTION INTRAVENOUS at 08:41

## 2019-11-25 ENCOUNTER — TELEPHONE (OUTPATIENT)
Dept: GENERAL RADIOLOGY | Facility: HOSPITAL | Age: 69
End: 2019-11-25

## 2019-11-25 NOTE — TELEPHONE ENCOUNTER
PT: TERESA BERNARD : 1950, DID NOT SHOW UP FOR HER CT CHEST LOW DOSE APPOINTMENT ON 19 AT 2:00PM. A MESSAGE WAS LEFT WITH MARIAN [phone]

## 2019-11-27 ENCOUNTER — DOCUMENTATION (OUTPATIENT)
Dept: CARDIOLOGY | Facility: CLINIC | Age: 69
End: 2019-11-27

## 2019-12-06 ENCOUNTER — OFFICE VISIT (OUTPATIENT)
Dept: CARDIOLOGY | Facility: CLINIC | Age: 69
End: 2019-12-06

## 2019-12-06 VITALS
OXYGEN SATURATION: 96 % | WEIGHT: 158 LBS | HEIGHT: 59 IN | SYSTOLIC BLOOD PRESSURE: 128 MMHG | BODY MASS INDEX: 31.85 KG/M2 | DIASTOLIC BLOOD PRESSURE: 88 MMHG | HEART RATE: 88 BPM

## 2019-12-06 DIAGNOSIS — I10 ESSENTIAL HYPERTENSION: ICD-10-CM

## 2019-12-06 DIAGNOSIS — I25.10 CORONARY ARTERY DISEASE INVOLVING NATIVE CORONARY ARTERY OF NATIVE HEART WITHOUT ANGINA PECTORIS: Primary | ICD-10-CM

## 2019-12-06 PROCEDURE — 99214 OFFICE O/P EST MOD 30 MIN: CPT | Performed by: INTERNAL MEDICINE

## 2019-12-06 RX ORDER — LOSARTAN POTASSIUM 50 MG/1
50 TABLET ORAL DAILY
Qty: 30 TABLET | Refills: 6 | Status: SHIPPED | OUTPATIENT
Start: 2019-12-06

## 2019-12-06 NOTE — PROGRESS NOTES
Ave Ramirez  69 y.o. female    12/06/2019  1. Coronary artery disease involving native coronary artery of native heart without angina pectoris    2. Essential hypertension        History of Present Illness  Ave Ramirez is a 69-year-old female with history of coronary artery disease, peripheral vascular disease, hypertension, hyperlipidemia, hypothyroidism who was presented with multiple symptomatology including chest pain, dyspnea, palpitation with no new EKG changes in 11/ 2019.   She had previous intervention to the diagonal coronary artery back in 2003 and peripheral vascular intervention.   Last cardiac catheterization was in 2014 which showed patent stent in the diagonal coronary artery and noncritical CAD.  She is being seen in our office after a long interval.    Further work-up included a Holter monitor which showed the following findings:   Holter monitoring showing predominantly sinus rhythm with rare PACs and PVCs with no sustained arrhythmias.  The patient did report symptoms such as shortness of breath, chest pain, rapid heart rate but this did not correlate with any cardiac arrhythmia  Lexiscan Cardiolite stress test showed:  · Findings consistent with an equivocal ECG stress test.  · Left ventricular ejection fraction is hyperdynamic (Calculated EF > 70%).  · Myocardial perfusion imaging indicates a normal myocardial perfusion study with no evidence of ischemia.  · Impressions are consistent with a low risk study.  Echocardiogram showed:  · The study is technically difficult for diagnosis.  · Left ventricular wall thickness is consistent with mild concentric hypertrophy.  · Estimated EF = 58%.  · Left ventricular systolic function is normal.  · Left ventricular diastolic dysfunction (grade I) consistent with impaired relaxation.  · Right ventricular cavity is borderline dilated.  · Mild mitral valve regurgitation is present  ·   SUBJECTIVE    Allergies   Allergen Reactions   • Penicillins     • Plavix [Clopidogrel]          Past Medical History:   Diagnosis Date   • Acute angina (CMS/HCC)    • Acute bronchitis    • Acute sinusitis    • Anxiety    • Atherosclerotic heart disease of native coronary artery without angina pectoris    • Borderline glaucoma    • CAD (coronary artery disease)    • Cough    • Depression    • Disease of gallbladder     s/p lap jocelyne and normal ioc      • Encounter for gynecological examination (general) (routine) without abnormal findings    • Encounter for screening mammogram for malignant neoplasm of breast    • History of echocardiogram 2014    Normal LV systolic funciton with Ef of 55% with diastolic relaxation abnormality of the left ventricle. Mild mitral regurgitation.   • Hyperlipidemia    • Hypertension    • Hypothyroidism    • Keratoconjunctivitis sicca (CMS/HCC)    • Myopia     refractive change OD    • Nausea and vomiting    • Nuclear cataract    • PVD (peripheral vascular disease) (CMS/HCC)    • SOB (shortness of breath)    • Upper respiratory infection          Past Surgical History:   Procedure Laterality Date   • CARDIAC CATHETERIZATION  2014    Medically manageable coronary artery disease in the previously placed stent in the diagonal coronary artery was patent with liminal irregularities. Normal LV systolic function with no wall motion abnormalities   • CARDIAC CATHETERIZATION     •  SECTION     • CHOLECYSTECTOMY  2012    intraoperative cholangiogram. Cholecystitis & cholelithiasis   • CORONARY STENT PLACEMENT      [facility]   • MAMMO BILATERAL  2016    DIAG MAMM, BILAT DIGITAL  (Medicare) (Other abnormal and inconclusive findings on diagnostic imaging of breast)    • MAMMO BILATERAL  2016    SCREENING MAMMOGRAPHY DIGITAL  (Medicare) (Encounter for screening mammogram for malignant neoplasm of breast)    • OTHER SURGICAL HISTORY  2003    OCT DISC [facility] 71915 (Borderline glaucoma)    • TONSILLECTOMY            History reviewed. No pertinent family history.      Social History     Socioeconomic History   • Marital status:      Spouse name: Not on file   • Number of children: Not on file   • Years of education: Not on file   • Highest education level: Not on file   Tobacco Use   • Smoking status: Current Every Day Smoker   • Smokeless tobacco: Never Used   Substance and Sexual Activity   • Alcohol use: No   • Drug use: No   • Sexual activity: Defer         Current Outpatient Medications   Medication Sig Dispense Refill   • aspirin 81 MG EC tablet Take 1 tablet by mouth Daily. 30 tablet 6   • atorvastatin (LIPITOR) 20 MG tablet Take 20 mg by mouth Daily.     • Cholecalciferol (VITAMIN D3) 2000 UNITS tablet Take 1 tablet by mouth Daily.     • Cyanocobalamin (VITAMIN B-12) 500 MCG sublingual tablet Place 1 tablet under the tongue Daily.     • diazepam (VALIUM) 5 MG tablet Take 5 mg by mouth Every 12 (Twelve) Hours As Needed for Anxiety.     • docusate sodium (COLACE) 100 MG capsule Take 100 mg by mouth Daily.     • escitalopram (LEXAPRO) 20 MG tablet Take 20 mg by mouth Every Night.     • furosemide (LASIX) 20 MG tablet Take 20 mg by mouth As Needed.     • gabapentin (NEURONTIN) 100 MG capsule Take 100 mg by mouth 3 (Three) Times a Day.     • hydrOXYzine (ATARAX) 25 MG tablet Take 25 mg by mouth 3 (Three) Times a Day As Needed for Itching.     • isosorbide mononitrate (IMDUR) 30 MG 24 hr tablet Take 1 tablet by mouth  every day 90 tablet 3   • [START ON 4/11/2106] levothyroxine (SYNTHROID, LEVOTHROID) 75 MCG tablet Take 75 mcg by mouth Daily.     • losartan (COZAAR) 50 MG tablet Take 1 tablet by mouth Daily. Take in PM 30 tablet 6   • metoprolol succinate XL (TOPROL-XL) 50 MG 24 hr tablet Take 50 mg by mouth Every Night.     • omeprazole (priLOSEC) 40 MG capsule Take 40 mg by mouth Daily.     • tiZANidine (ZANAFLEX) 4 MG tablet Take 4 mg by mouth At Night As Needed for Muscle Spasms.     • traMADol (ULTRAM) 50  "MG tablet Take 50 mg by mouth 3 (Three) Times a Day As Needed for Moderate Pain .       No current facility-administered medications for this visit.          OBJECTIVE    Ht 149.9 cm (59.02\")   Wt 71.7 kg (158 lb)   BMI 31.89 kg/m²         Review of Systems     Constitutional:  Denies recent weight loss, weight gain, fever or chills     HENT:  Denies any hearing loss, epistaxis, hoarseness, or difficulty speaking.     Eyes: Wears eyeglasses or contact lenses     Respiratory:  Dyspnea with exertion,no cough, wheezing, or hemoptysis.     Cardiovascular: See HPI    Gastrointestinal:  Denies change in bowel habits, dyspepsia, ulcer disease, hematochezia, or melena.     Endocrine: Negative for cold intolerance, heat intolerance, polydipsia, polyphagia and polyuria.     Genitourinary: Negative.      Musculoskeletal: Denies any history of arthritic symptoms or back problems     Skin:  Denies any change in hair or nails, rashes, or skin lesions.     Allergic/Immunologic: Negative.  Negative for environmental allergies, food allergies and immunocompromised state.     Neurological:  Denies any history of recurrent headaches, strokes, TIA, or seizure disorder.     Hematological: Denies any food allergies, seasonal allergies, bleeding disorders, or lymphadenopathy.     Psychiatric/Behavioral: Denies any history of depression, substance abuse, or change in cognitive function.         Physical Exam     Constitutional: Cooperative, alert and oriented,  in no acute distress.     HENT:   Head: Normocephalic, normal hair patterns, no masses or tenderness.  Ears, Nose, and Throat: No gross abnormalities. No pallor or cyanosis.   Eyes: EOMS intact, PERRL, conjunctivae and lids unremarkable. Fundoscopic exam and visual fields not performed.   Neck: No palpable masses or adenopathy, no thyromegaly, no JVD, carotid pulses are full and equal bilaterally and without  Bruits.     Cardiovascular: Regular rhythm, S1 and S2 normal, no S3 or " S4.  No murmurs, gallops, or rubs detected.     Pulmonary/Chest: Chest: normal symmetry,  normal respiratory excursion, no intercostal retraction, no use of accessory muscles.            Pulmonary: Normal breath sounds. No rales or ronchi.    Abdominal: Abdomen soft, bowel sounds normoactive, no masses, no hepatosplenomegaly, non-tender, no bruits.     Musculoskeletal: No deformities, clubbing, cyanosis, erythema, or edema observed.     Neurological: No gross motor or sensory deficits noted, affect appropriate, oriented to time, person, place.     Skin: Warm and dry to the touch, no apparent skin lesions or masses noted.     Psychiatric: She has a normal mood and affect. Her behavior is normal. Judgment and thought content normal.         Procedures      Lab Results   Component Value Date    WBC 10.52 11/12/2019    HGB 14.8 11/12/2019    HCT 45.1 11/12/2019    MCV 83.8 11/12/2019     11/12/2019     Lab Results   Component Value Date    GLUCOSE 97 11/12/2019    BUN 8 11/12/2019    CREATININE 0.75 11/12/2019    EGFRIFNONA 77 11/12/2019    BCR 10.7 11/12/2019    CO2 29.7 (H) 11/12/2019    CALCIUM 9.3 11/12/2019    ALBUMIN 4.10 11/12/2019    LABIL2 1.3 12/23/2018    AST 31 11/12/2019    ALT 28 11/12/2019     Lab Results   Component Value Date    CHOL 181 11/12/2019     Lab Results   Component Value Date    TRIG 271 (H) 11/12/2019    TRIG 168 12/12/2014     Lab Results   Component Value Date    HDL 32 (L) 11/12/2019    HDL 33 (L) 12/12/2014     No components found for: LDLCALC  Lab Results   Component Value Date    LDL 95 11/12/2019    LDL 68 12/12/2014     No results found for: HDLLDLRATIO  No components found for: CHOLHDL  Lab Results   Component Value Date    HGBA1C 5.5 12/11/2014     Lab Results   Component Value Date    TSH 0.021 (L) 11/12/2019           ASSESSMENT AND PLAN  Ave Ramirez has multiple cardiac symptoms but objective testing has proven negative for ischemia or significant arrhythmia.  I have  continued antiplatelet therapy with aspirin, lipid-lowering therapy with atorvastatin, low-dose diuretics.  Antianginal therapy with isosorbide mononitrate and metoprolol succinate has been continued along with losartan.  Patient has been reassured.    Ave was seen today for follow-up.    Diagnoses and all orders for this visit:    Coronary artery disease involving native coronary artery of native heart without angina pectoris    Essential hypertension        Patient's Body mass index is 31.89 kg/m². BMI is above normal parameters. Recommendations include: exercise counseling and nutrition counseling.      Ave Ramirez is a current cigarettes user.  She currently smokes 1 pack of cigarettes per day for a duration of 55 years. I have educated her on the risk of diseases from using tobacco products such as cancer, COPD and heart diease.       I spent 3  minutes counseling the patient.          Darrius Molina MD  12/6/2019  10:54 AM

## 2020-03-12 ENCOUNTER — APPOINTMENT (OUTPATIENT)
Dept: CT IMAGING | Facility: HOSPITAL | Age: 70
End: 2020-03-12

## 2020-03-19 ENCOUNTER — APPOINTMENT (OUTPATIENT)
Dept: CT IMAGING | Facility: HOSPITAL | Age: 70
End: 2020-03-19

## 2020-06-18 DIAGNOSIS — I25.10 CORONARY ARTERY DISEASE INVOLVING NATIVE CORONARY ARTERY OF NATIVE HEART WITHOUT ANGINA PECTORIS: Primary | ICD-10-CM

## 2021-01-06 ENCOUNTER — TELEPHONE (OUTPATIENT)
Dept: CARDIOLOGY | Facility: CLINIC | Age: 71
End: 2021-01-06

## 2021-01-06 NOTE — TELEPHONE ENCOUNTER
Called pt, her  answered. Informed him that Dr. Castro agreed to see her sooner. We are going to add her on to this Friday (1/8/21) at noon. Pt's  verbalized her understanding.

## 2021-01-08 ENCOUNTER — OFFICE VISIT (OUTPATIENT)
Dept: CARDIOLOGY | Facility: CLINIC | Age: 71
End: 2021-01-08

## 2021-01-08 VITALS
HEIGHT: 59 IN | BODY MASS INDEX: 34.27 KG/M2 | SYSTOLIC BLOOD PRESSURE: 118 MMHG | DIASTOLIC BLOOD PRESSURE: 76 MMHG | WEIGHT: 170 LBS | OXYGEN SATURATION: 96 % | HEART RATE: 56 BPM

## 2021-01-08 DIAGNOSIS — I25.10 CORONARY ARTERY DISEASE INVOLVING NATIVE CORONARY ARTERY OF NATIVE HEART WITHOUT ANGINA PECTORIS: Primary | ICD-10-CM

## 2021-01-08 DIAGNOSIS — R42 DIZZINESS: ICD-10-CM

## 2021-01-08 DIAGNOSIS — I73.9 PVD (PERIPHERAL VASCULAR DISEASE) (HCC): ICD-10-CM

## 2021-01-08 DIAGNOSIS — I10 ESSENTIAL HYPERTENSION: ICD-10-CM

## 2021-01-08 PROCEDURE — 93000 ELECTROCARDIOGRAM COMPLETE: CPT | Performed by: INTERNAL MEDICINE

## 2021-01-08 PROCEDURE — 99215 OFFICE O/P EST HI 40 MIN: CPT | Performed by: INTERNAL MEDICINE

## 2021-01-08 RX ORDER — ALBUTEROL SULFATE 90 UG/1
2 AEROSOL, METERED RESPIRATORY (INHALATION) EVERY 4 HOURS PRN
COMMUNITY
Start: 2020-12-14 | End: 2021-06-13

## 2021-01-08 RX ORDER — FLUTICASONE PROPIONATE 50 MCG
2 SPRAY, SUSPENSION (ML) NASAL DAILY
Status: ON HOLD | COMMUNITY
End: 2022-02-28

## 2021-01-08 NOTE — PROGRESS NOTES
Ave Ramirez  71 y.o. female      1. Coronary artery disease involving native coronary artery of native heart without angina pectoris    2. PVD (peripheral vascular disease) (CMS/[facility])    3. Essential hypertension    4. Dizziness        History of Present Illness  Ave Ramirez is a 71-year-old female who is being seen by me after long interval.  Her history is remarkable for coronary artery disease, peripheral vascular disease, hypertension, hyperlipidemia, hypothyroidism.   She presents for evaluation of episodes of dizziness which has been present for the past few weeks.  She has not had any syncopal episodes.  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  She was referred to neurology by her primary care physician and has had an evaluation by Dr. Guerra, and I understand that further testing including MRI is being planned.   Her symptoms are not associated with palpitation and no chest pain or dyspnea was reported.  Her blood pressure has been fluctuating.  She claims compliant with all her medications.  She is no longer on losartan which was prescribed about a year ago when her blood pressure was noted to be high.    The patient had previous intervention to the diagonal coronary artery back in 2003 and peripheral vascular intervention.   Last cardiac catheterization was in 2014 which showed patent stent in the diagonal coronary artery and noncritical CAD.  She was evaluated for multiple cardiac symptoms back in November 2019 when she presented with palpitation, chest pain and dyspnea.  Holter monitoring November 2019 showed predominantly sinus rhythm with rare PACs and PVCs with no sustained arrhythmias.  The patient did report symptoms such as shortness of breath, chest pain, rapid heart rate but this did not correlate with any cardiac arrhythmia  Lexiscan Cardiolite stress test showed:  · Findings consistent with an  equivocal ECG stress test.  · Left ventricular ejection fraction is hyperdynamic (Calculated EF > 70%).  · Myocardial perfusion imaging indicates a normal myocardial perfusion study with no evidence of ischemia.  · Impressions are consistent with a low risk study.  Echocardiogram showed:  · The study is technically difficult for diagnosis.  · Left ventricular wall thickness is consistent with mild concentric hypertrophy.  · Estimated EF = 58%.  · Left ventricular systolic function is normal.  · Left ventricular diastolic dysfunction (grade I) consistent with impaired relaxation.  · Right ventricular cavity is borderline dilated.  · Mild mitral valve regurgitation is present    EKG  today showed sinus rhythm with heart rate of 57 bpm, leftward axis, IVCD of the LBBB type.  Nonspecific ST-T changes.  EKG unchanged from previous studies    Clinical exam today showed normal heart rate and blood pressure.  No signs of bronchospasm or congestive heart failure was noted.  No neurological deficit was noted.  I did not appreciate any carotid bruit.    SUBJECTIVE    Allergies   Allergen Reactions   • Penicillins    • Plavix [Clopidogrel]          Past Medical History:   Diagnosis Date   • Acute angina (CMS/HCC)    • Acute bronchitis    • Acute sinusitis    • Anxiety    • Atherosclerotic heart disease of native coronary artery without angina pectoris    • Borderline glaucoma    • CAD (coronary artery disease)    • Cough    • Depression    • Disease of gallbladder     s/p lap jocleyne and normal ioc      • Encounter for gynecological examination (general) (routine) without abnormal findings    • Encounter for screening mammogram for malignant neoplasm of breast    • History of echocardiogram 12/12/2014    Normal LV systolic funciton with Ef of 55% with diastolic relaxation abnormality of the left ventricle. Mild mitral regurgitation.   • Hyperlipidemia    • Hypertension    • Hypothyroidism    • Keratoconjunctivitis sicca (CMS/HCC)     • Myopia     refractive change OD    • Nausea and vomiting    • Nuclear cataract    • PVD (peripheral vascular disease) (CMS/HCC)    • SOB (shortness of breath)    • Upper respiratory infection          Past Surgical History:   Procedure Laterality Date   • CARDIAC CATHETERIZATION  2014    Medically manageable coronary artery disease in the previously placed stent in the diagonal coronary artery was patent with liminal irregularities. Normal LV systolic function with no wall motion abnormalities   • CARDIAC CATHETERIZATION     •  SECTION     • CHOLECYSTECTOMY  2012    intraoperative cholangiogram. Cholecystitis & cholelithiasis   • CORONARY STENT PLACEMENT      [facility]   • MAMMO BILATERAL  2016    DIAG MAMM, BILAT DIGITAL  (Medicare) (Other abnormal and inconclusive findings on diagnostic imaging of breast)    • MAMMO BILATERAL  2016    SCREENING MAMMOGRAPHY DIGITAL  (Medicare) (Encounter for screening mammogram for malignant neoplasm of breast)    • OTHER SURGICAL HISTORY  2003    OCT DISC [facility] 45341 (Borderline glaucoma)    • TONSILLECTOMY           History reviewed. No pertinent family history.      Social History     Socioeconomic History   • Marital status:      Spouse name: Not on file   • Number of children: Not on file   • Years of education: Not on file   • Highest education level: Not on file   Tobacco Use   • Smoking status: Current Every Day Smoker   • Smokeless tobacco: Never Used   Substance and Sexual Activity   • Alcohol use: No   • Drug use: No   • Sexual activity: Defer         Current Outpatient Medications   Medication Sig Dispense Refill   • albuterol sulfate  (90 Base) MCG/ACT inhaler Inhale 2 puffs Every 4 (Four) Hours As Needed.     • aspirin 81 MG EC tablet Take 1 tablet by mouth Daily. 30 tablet 6   • atorvastatin (LIPITOR) 20 MG tablet Take 40 mg by mouth Daily.     • Cholecalciferol (VITAMIN D3) 2000 UNITS tablet Take 1  "tablet by mouth Daily.     • diazepam (VALIUM) 5 MG tablet Take 5 mg by mouth At Night As Needed for Anxiety.     • docusate sodium (COLACE) 100 MG capsule Take 100 mg by mouth Daily.     • escitalopram (LEXAPRO) 20 MG tablet Take 20 mg by mouth Every Night.     • fluticasone (FLONASE) 50 MCG/ACT nasal spray 2 sprays into the nostril(s) as directed by provider Daily.     • Fluticasone Furoate-Vilanterol (Breo Ellipta) 200-25 MCG/INH inhaler INHALE 1 PUFF BY MOUTH DAILY     • furosemide (LASIX) 20 MG tablet Take 20 mg by mouth As Needed.     • gabapentin (NEURONTIN) 100 MG capsule Take 100 mg by mouth 3 (Three) Times a Day.     • hydrOXYzine (ATARAX) 25 MG tablet Take 25 mg by mouth 3 (Three) Times a Day As Needed for Itching.     • [START ON 4/11/2106] levothyroxine (SYNTHROID, LEVOTHROID) 75 MCG tablet Take 75 mcg by mouth Daily.     • losartan (COZAAR) 50 MG tablet Take 1 tablet by mouth Daily. Take in PM 30 tablet 6   • metoprolol succinate XL (TOPROL-XL) 50 MG 24 hr tablet Take 50 mg by mouth Every Night.     • omeprazole (priLOSEC) 40 MG capsule Take 40 mg by mouth Daily.     • tiZANidine (ZANAFLEX) 4 MG tablet Take 4 mg by mouth At Night As Needed for Muscle Spasms. 1 and 1/2 tablet by mouth 3 times a day       No current facility-administered medications for this visit.          OBJECTIVE    /76 (BP Location: Left arm, Patient Position: Sitting, Cuff Size: Adult)   Pulse 56   Ht 149.9 cm (59.02\")   Wt 77.1 kg (170 lb)   SpO2 96%   BMI 34.31 kg/m²         Review of Systems: The following systems were reviewed and no changes were noted.     Constitutional:  Denies recent weight loss, weight gain, fever or chills     HENT:  Denies any hearing loss, epistaxis, hoarseness, or difficulty speaking.     Eyes: Wears eyeglasses or contact lenses     Respiratory: No dyspnea, no cough, wheezing, or hemoptysis.     Cardiovascular: See HPI    Gastrointestinal:  Denies change in bowel habits, dyspepsia, ulcer " disease, hematochezia, or melena.     Endocrine: Negative for cold intolerance, heat intolerance, polydipsia, polyphagia and polyuria.     Genitourinary: Negative.      Musculoskeletal: Denies any history of arthritic symptoms or back problems     Neurological:  Denies any history of recurrent headaches, strokes, TIA, or seizure disorder.     Hematological: Denies any food allergies, seasonal allergies, bleeding disorders, or lymphadenopathy.     Psychiatric/Behavioral: Denies any history of depression, substance abuse, or change in cognitive function.     Physical Exam : The following systems were reassessed and no changes were noted    Constitutional: Cooperative, alert and oriented,  in no acute distress.     HENT:   Head: Normocephalic, normal hair patterns, no masses or tenderness.  Ears, Nose, and Throat: No gross abnormalities. No pallor or cyanosis.   Eyes: EOMS intact, PERRL, conjunctivae and lids unremarkable. Fundoscopic exam and visual fields not performed.   Neck: No palpable masses or adenopathy, no thyromegaly, no JVD, carotid pulses are full and equal bilaterally and without  Bruits.     Cardiovascular: Regular rhythm, S1 and S2 normal, no S3 or S4.  No murmurs, gallops, or rubs detected.     Pulmonary/Chest: Chest: normal symmetry,  normal respiratory excursion, no intercostal retraction, no use of accessory muscles.            Pulmonary: Normal breath sounds. No rales or ronchi.    Abdominal: Abdomen soft, bowel sounds normoactive, no masses, no hepatosplenomegaly, non-tender, no bruits.     Musculoskeletal: No deformities, clubbing, cyanosis, erythema, or edema observed.     Neurological: No gross motor or sensory deficits noted, affect appropriate, oriented to time, person, place.     Skin: Warm and dry to the touch, no apparent skin lesions or masses noted.     Psychiatric: She has a normal mood and affect. Her behavior is normal. Judgment and thought content normal.         Procedures      Lab  Results   Component Value Date    WBC 10.52 11/12/2019    HGB 14.8 11/12/2019    HCT 45.1 11/12/2019    MCV 83.8 11/12/2019     11/12/2019     Lab Results   Component Value Date    GLUCOSE 97 11/12/2019    BUN 8 11/12/2019    CREATININE 0.75 11/12/2019    EGFRIFNONA 77 11/12/2019    BCR 10.7 11/12/2019    CO2 29.7 (H) 11/12/2019    CALCIUM 9.3 11/12/2019    ALBUMIN 4.10 11/12/2019    LABIL2 1.3 12/23/2018    AST 31 11/12/2019    ALT 28 11/12/2019     Lab Results   Component Value Date    CHOL 181 11/12/2019     Lab Results   Component Value Date    TRIG 271 (H) 11/12/2019    TRIG 168 12/12/2014     Lab Results   Component Value Date    HDL 32 (L) 11/12/2019    HDL 33 (L) 12/12/2014     No components found for: LDLCALC  Lab Results   Component Value Date    LDL 95 11/12/2019    LDL 68 12/12/2014     No results found for: HDLLDLRATIO  No components found for: CHOLHDL  Lab Results   Component Value Date    HGBA1C 6.0 (H) 12/14/2020     Lab Results   Component Value Date    TSH 0.021 (L) 11/12/2019           ASSESSMENT AND PLAN  Ave Ramirez has multiple symptoms as discussed in detail under history of present illness.    The exact etiology for her dizziness is unclear.  I suspect however that it could be related to her multiple medications that she is on, including gabapentin, Zanaflex, Lexapro, Valium.  Suspicion for any cardiac arrhythmia is low.  I agree with neurological work-up to make sure that she does not have any carotid or vertebrobasilar disease.  No signs of angina or congestive heart failure was noted.  The patient unfortunately continues to smoke and we had a discussion about smoking cessation.  I have reviewed her cardiac medications and have discontinued isosorbide mononitrate with the hope that this will improve her blood pressure further.  Metoprolol succinate has been continued and antiplatelet therapy with aspirin and lipid-lowering therapy with atorvastatin has been continued.  I have  advised her to maintain a very high fluid intake up to 50 to 60 ounces per day.  He takes diuretics only on a as needed basis.    About 45 minutes were spent in the assessment and evaluation of this patient    Diagnoses and all orders for this visit:    1. Coronary artery disease involving native coronary artery of native heart without angina pectoris (Primary)  -     ECG 12 Lead    2. PVD (peripheral vascular disease) (CMS/HCC)    3. Essential hypertension    4. Dizziness        Patient's Body mass index is 34.31 kg/m². BMI is above normal parameters. Recommendations include: exercise counseling and nutrition counseling.      Ave Ramirez is a current cigarettes user.  She currently smokes 1 pack of cigarettes per day for a duration of 55 years. I have educated her on the risk of diseases from using tobacco products such as cancer, COPD and heart diease.       I spent 3  minutes counseling the patient.          Darrius Molina MD  1/8/2021  16:02 CST

## 2021-01-14 LAB
QT INTERVAL: 504 MS
QTC INTERVAL: 490 MS

## 2021-03-25 ENCOUNTER — IMMUNIZATION (OUTPATIENT)
Dept: VACCINE CLINIC | Facility: HOSPITAL | Age: 71
End: 2021-03-25

## 2021-03-25 PROCEDURE — 0001A: CPT | Performed by: THORACIC SURGERY (CARDIOTHORACIC VASCULAR SURGERY)

## 2021-03-25 PROCEDURE — 91300 HC SARSCOV02 VAC 30MCG/0.3ML IM: CPT | Performed by: THORACIC SURGERY (CARDIOTHORACIC VASCULAR SURGERY)

## 2021-04-15 ENCOUNTER — IMMUNIZATION (OUTPATIENT)
Dept: VACCINE CLINIC | Facility: HOSPITAL | Age: 71
End: 2021-04-15

## 2021-04-15 PROCEDURE — 91300 HC SARSCOV02 VAC 30MCG/0.3ML IM: CPT | Performed by: THORACIC SURGERY (CARDIOTHORACIC VASCULAR SURGERY)

## 2021-04-15 PROCEDURE — 0002A: CPT | Performed by: THORACIC SURGERY (CARDIOTHORACIC VASCULAR SURGERY)

## 2022-02-28 ENCOUNTER — HOSPITAL ENCOUNTER (OUTPATIENT)
Facility: HOSPITAL | Age: 72
Setting detail: OBSERVATION
Discharge: HOME | End: 2022-03-01
Attending: FAMILY MEDICINE | Admitting: HOSPITALIST

## 2022-02-28 ENCOUNTER — APPOINTMENT (OUTPATIENT)
Dept: GENERAL RADIOLOGY | Facility: HOSPITAL | Age: 72
End: 2022-02-28

## 2022-02-28 ENCOUNTER — APPOINTMENT (OUTPATIENT)
Dept: CT IMAGING | Facility: HOSPITAL | Age: 72
End: 2022-02-28

## 2022-02-28 DIAGNOSIS — R07.2 PRECORDIAL PAIN: Primary | ICD-10-CM

## 2022-02-28 PROBLEM — I63.9 CVA (CEREBRAL VASCULAR ACCIDENT): Status: ACTIVE | Noted: 2022-02-28

## 2022-02-28 LAB
ALBUMIN SERPL-MCNC: 4.2 G/DL (ref 3.5–5.2)
ALBUMIN/GLOB SERPL: 1.4 G/DL
ALP SERPL-CCNC: 135 U/L (ref 39–117)
ALT SERPL W P-5'-P-CCNC: 23 U/L (ref 1–33)
ANION GAP SERPL CALCULATED.3IONS-SCNC: 15 MMOL/L (ref 5–15)
AST SERPL-CCNC: 21 U/L (ref 1–32)
BASOPHILS # BLD AUTO: 0.08 10*3/MM3 (ref 0–0.2)
BASOPHILS NFR BLD AUTO: 0.8 % (ref 0–1.5)
BILIRUB SERPL-MCNC: 0.7 MG/DL (ref 0–1.2)
BUN SERPL-MCNC: 8 MG/DL (ref 8–23)
BUN/CREAT SERPL: 8.5 (ref 7–25)
CALCIUM SPEC-SCNC: 9.1 MG/DL (ref 8.6–10.5)
CHLORIDE SERPL-SCNC: 101 MMOL/L (ref 98–107)
CK SERPL-CCNC: 69 U/L (ref 20–180)
CO2 SERPL-SCNC: 24 MMOL/L (ref 22–29)
CREAT SERPL-MCNC: 0.94 MG/DL (ref 0.57–1)
DEPRECATED RDW RBC AUTO: 41.7 FL (ref 37–54)
EGFRCR SERPLBLD CKD-EPI 2021: 65 ML/MIN/1.73
EOSINOPHIL # BLD AUTO: 0.34 10*3/MM3 (ref 0–0.4)
EOSINOPHIL NFR BLD AUTO: 3.4 % (ref 0.3–6.2)
ERYTHROCYTE [DISTWIDTH] IN BLOOD BY AUTOMATED COUNT: 13.5 % (ref 12.3–15.4)
FLUAV SUBTYP SPEC NAA+PROBE: NOT DETECTED
FLUBV RNA ISLT QL NAA+PROBE: NOT DETECTED
GLOBULIN UR ELPH-MCNC: 3 GM/DL
GLUCOSE SERPL-MCNC: 121 MG/DL (ref 65–99)
HCT VFR BLD AUTO: 40.9 % (ref 34–46.6)
HGB BLD-MCNC: 13.8 G/DL (ref 12–15.9)
HOLD SPECIMEN: NORMAL
HOLD SPECIMEN: NORMAL
IMM GRANULOCYTES # BLD AUTO: 0.06 10*3/MM3 (ref 0–0.05)
IMM GRANULOCYTES NFR BLD AUTO: 0.6 % (ref 0–0.5)
LYMPHOCYTES # BLD AUTO: 2.08 10*3/MM3 (ref 0.7–3.1)
LYMPHOCYTES NFR BLD AUTO: 20.7 % (ref 19.6–45.3)
MAGNESIUM SERPL-MCNC: 1.7 MG/DL (ref 1.6–2.4)
MCH RBC QN AUTO: 28.6 PG (ref 26.6–33)
MCHC RBC AUTO-ENTMCNC: 33.7 G/DL (ref 31.5–35.7)
MCV RBC AUTO: 84.7 FL (ref 79–97)
MONOCYTES # BLD AUTO: 0.93 10*3/MM3 (ref 0.1–0.9)
MONOCYTES NFR BLD AUTO: 9.3 % (ref 5–12)
NEUTROPHILS NFR BLD AUTO: 6.54 10*3/MM3 (ref 1.7–7)
NEUTROPHILS NFR BLD AUTO: 65.2 % (ref 42.7–76)
NRBC BLD AUTO-RTO: 0 /100 WBC (ref 0–0.2)
NT-PROBNP SERPL-MCNC: 274.7 PG/ML (ref 0–900)
PLATELET # BLD AUTO: 216 10*3/MM3 (ref 140–450)
PMV BLD AUTO: 10.2 FL (ref 6–12)
POTASSIUM SERPL-SCNC: 3.8 MMOL/L (ref 3.5–5.2)
PROT SERPL-MCNC: 7.2 G/DL (ref 6–8.5)
RBC # BLD AUTO: 4.83 10*6/MM3 (ref 3.77–5.28)
SARS-COV-2 RNA PNL SPEC NAA+PROBE: NOT DETECTED
SODIUM SERPL-SCNC: 140 MMOL/L (ref 136–145)
TROPONIN T SERPL-MCNC: <0.01 NG/ML (ref 0–0.03)
TROPONIN T SERPL-MCNC: <0.01 NG/ML (ref 0–0.03)
WBC NRBC COR # BLD: 10.03 10*3/MM3 (ref 3.4–10.8)
WHOLE BLOOD HOLD SPECIMEN: NORMAL
WHOLE BLOOD HOLD SPECIMEN: NORMAL

## 2022-02-28 PROCEDURE — 85025 COMPLETE CBC W/AUTO DIFF WBC: CPT | Performed by: FAMILY MEDICINE

## 2022-02-28 PROCEDURE — 87636 SARSCOV2 & INF A&B AMP PRB: CPT

## 2022-02-28 PROCEDURE — 70491 CT SOFT TISSUE NECK W/DYE: CPT

## 2022-02-28 PROCEDURE — G0378 HOSPITAL OBSERVATION PER HR: HCPCS

## 2022-02-28 PROCEDURE — C9803 HOPD COVID-19 SPEC COLLECT: HCPCS

## 2022-02-28 PROCEDURE — 25010000002 MORPHINE PER 10 MG: Performed by: FAMILY MEDICINE

## 2022-02-28 PROCEDURE — 83735 ASSAY OF MAGNESIUM: CPT | Performed by: FAMILY MEDICINE

## 2022-02-28 PROCEDURE — 93005 ELECTROCARDIOGRAM TRACING: CPT | Performed by: FAMILY MEDICINE

## 2022-02-28 PROCEDURE — 84484 ASSAY OF TROPONIN QUANT: CPT | Performed by: FAMILY MEDICINE

## 2022-02-28 PROCEDURE — 96372 THER/PROPH/DIAG INJ SC/IM: CPT

## 2022-02-28 PROCEDURE — 80053 COMPREHEN METABOLIC PANEL: CPT | Performed by: FAMILY MEDICINE

## 2022-02-28 PROCEDURE — 25010000002 ENOXAPARIN PER 10 MG: Performed by: HOSPITALIST

## 2022-02-28 PROCEDURE — 93010 ELECTROCARDIOGRAM REPORT: CPT | Performed by: INTERNAL MEDICINE

## 2022-02-28 PROCEDURE — 96361 HYDRATE IV INFUSION ADD-ON: CPT

## 2022-02-28 PROCEDURE — 71045 X-RAY EXAM CHEST 1 VIEW: CPT

## 2022-02-28 PROCEDURE — 25010000002 IOPAMIDOL 61 % SOLUTION: Performed by: FAMILY MEDICINE

## 2022-02-28 PROCEDURE — 83880 ASSAY OF NATRIURETIC PEPTIDE: CPT | Performed by: FAMILY MEDICINE

## 2022-02-28 PROCEDURE — 82550 ASSAY OF CK (CPK): CPT | Performed by: FAMILY MEDICINE

## 2022-02-28 PROCEDURE — 99284 EMERGENCY DEPT VISIT MOD MDM: CPT

## 2022-02-28 PROCEDURE — 93005 ELECTROCARDIOGRAM TRACING: CPT

## 2022-02-28 PROCEDURE — 96374 THER/PROPH/DIAG INJ IV PUSH: CPT

## 2022-02-28 RX ORDER — LEVOTHYROXINE SODIUM 0.12 MG/1
125 TABLET ORAL DAILY
Status: DISCONTINUED | OUTPATIENT
Start: 2022-03-01 | End: 2022-03-01 | Stop reason: HOSPADM

## 2022-02-28 RX ORDER — SODIUM CHLORIDE 0.9 % (FLUSH) 0.9 %
10 SYRINGE (ML) INJECTION AS NEEDED
Status: DISCONTINUED | OUTPATIENT
Start: 2022-02-28 | End: 2022-03-01 | Stop reason: HOSPADM

## 2022-02-28 RX ORDER — TIZANIDINE 4 MG/1
4 TABLET ORAL NIGHTLY PRN
Status: DISCONTINUED | OUTPATIENT
Start: 2022-02-28 | End: 2022-03-01 | Stop reason: HOSPADM

## 2022-02-28 RX ORDER — ACETAMINOPHEN 325 MG/1
TABLET ORAL
Status: COMPLETED
Start: 2022-02-28 | End: 2022-02-28

## 2022-02-28 RX ORDER — HYDROCODONE BITARTRATE AND ACETAMINOPHEN 5; 325 MG/1; MG/1
1 TABLET ORAL EVERY 6 HOURS PRN
Status: DISCONTINUED | OUTPATIENT
Start: 2022-02-28 | End: 2022-03-01 | Stop reason: HOSPADM

## 2022-02-28 RX ORDER — PANTOPRAZOLE SODIUM 40 MG/1
40 TABLET, DELAYED RELEASE ORAL EVERY MORNING
Status: DISCONTINUED | OUTPATIENT
Start: 2022-03-01 | End: 2022-03-01 | Stop reason: HOSPADM

## 2022-02-28 RX ORDER — ACETAMINOPHEN 325 MG/1
650 TABLET ORAL EVERY 4 HOURS PRN
Status: DISCONTINUED | OUTPATIENT
Start: 2022-02-28 | End: 2022-03-01 | Stop reason: HOSPADM

## 2022-02-28 RX ORDER — ESCITALOPRAM OXALATE 10 MG/1
20 TABLET ORAL NIGHTLY
Status: DISCONTINUED | OUTPATIENT
Start: 2022-02-28 | End: 2022-03-01 | Stop reason: HOSPADM

## 2022-02-28 RX ORDER — DIAZEPAM 5 MG/1
5 TABLET ORAL NIGHTLY PRN
Status: DISCONTINUED | OUTPATIENT
Start: 2022-02-28 | End: 2022-03-01 | Stop reason: HOSPADM

## 2022-02-28 RX ORDER — ATORVASTATIN CALCIUM 40 MG/1
40 TABLET, FILM COATED ORAL DAILY
Status: DISCONTINUED | OUTPATIENT
Start: 2022-02-28 | End: 2022-03-01 | Stop reason: HOSPADM

## 2022-02-28 RX ORDER — METOPROLOL SUCCINATE 50 MG/1
50 TABLET, EXTENDED RELEASE ORAL NIGHTLY
Status: DISCONTINUED | OUTPATIENT
Start: 2022-02-28 | End: 2022-02-28

## 2022-02-28 RX ORDER — GABAPENTIN 100 MG/1
100 CAPSULE ORAL 3 TIMES DAILY
Status: DISCONTINUED | OUTPATIENT
Start: 2022-02-28 | End: 2022-03-01 | Stop reason: HOSPADM

## 2022-02-28 RX ORDER — HYDROCODONE BITARTRATE AND ACETAMINOPHEN 5; 325 MG/1; MG/1
1 TABLET ORAL EVERY 6 HOURS PRN
COMMUNITY

## 2022-02-28 RX ORDER — SODIUM CHLORIDE 0.9 % (FLUSH) 0.9 %
10 SYRINGE (ML) INJECTION EVERY 12 HOURS SCHEDULED
Status: DISCONTINUED | OUTPATIENT
Start: 2022-02-28 | End: 2022-03-01 | Stop reason: HOSPADM

## 2022-02-28 RX ORDER — DIPHENHYDRAMINE HYDROCHLORIDE AND LIDOCAINE HYDROCHLORIDE AND ALUMINUM HYDROXIDE AND MAGNESIUM HYDRO
5 KIT EVERY 6 HOURS
Status: DISCONTINUED | OUTPATIENT
Start: 2022-02-28 | End: 2022-03-01 | Stop reason: HOSPADM

## 2022-02-28 RX ORDER — HYDROXYZINE HYDROCHLORIDE 25 MG/1
25 TABLET, FILM COATED ORAL 3 TIMES DAILY PRN
Status: DISCONTINUED | OUTPATIENT
Start: 2022-02-28 | End: 2022-03-01 | Stop reason: HOSPADM

## 2022-02-28 RX ORDER — DOCUSATE SODIUM 100 MG/1
100 CAPSULE, LIQUID FILLED ORAL DAILY
Status: DISCONTINUED | OUTPATIENT
Start: 2022-02-28 | End: 2022-03-01 | Stop reason: HOSPADM

## 2022-02-28 RX ORDER — LEVOTHYROXINE SODIUM 0.07 MG/1
75 TABLET ORAL DAILY
Status: DISCONTINUED | OUTPATIENT
Start: 2022-02-28 | End: 2022-02-28

## 2022-02-28 RX ORDER — METOPROLOL SUCCINATE 25 MG/1
25 TABLET, EXTENDED RELEASE ORAL NIGHTLY
Status: DISCONTINUED | OUTPATIENT
Start: 2022-03-01 | End: 2022-03-01 | Stop reason: HOSPADM

## 2022-02-28 RX ORDER — ASPIRIN 81 MG/1
81 TABLET ORAL DAILY
Status: DISCONTINUED | OUTPATIENT
Start: 2022-02-28 | End: 2022-03-01 | Stop reason: HOSPADM

## 2022-02-28 RX ORDER — SODIUM CHLORIDE 9 MG/ML
125 INJECTION, SOLUTION INTRAVENOUS CONTINUOUS
Status: DISCONTINUED | OUTPATIENT
Start: 2022-02-28 | End: 2022-03-01 | Stop reason: HOSPADM

## 2022-02-28 RX ADMIN — METOPROLOL SUCCINATE 50 MG: 50 TABLET, EXTENDED RELEASE ORAL at 21:02

## 2022-02-28 RX ADMIN — ESCITALOPRAM OXALATE 20 MG: 10 TABLET ORAL at 21:02

## 2022-02-28 RX ADMIN — SODIUM CHLORIDE 500 ML: 9 INJECTION, SOLUTION INTRAVENOUS at 13:09

## 2022-02-28 RX ADMIN — MORPHINE SULFATE 4 MG: 4 INJECTION, SOLUTION INTRAMUSCULAR; INTRAVENOUS at 13:59

## 2022-02-28 RX ADMIN — ENOXAPARIN SODIUM 40 MG: 40 INJECTION SUBCUTANEOUS at 21:02

## 2022-02-28 RX ADMIN — IOPAMIDOL 90 ML: 612 INJECTION, SOLUTION INTRAVENOUS at 14:41

## 2022-02-28 RX ADMIN — LEVOTHYROXINE SODIUM 75 MCG: 0.07 TABLET ORAL at 21:02

## 2022-02-28 RX ADMIN — ATORVASTATIN CALCIUM 40 MG: 40 TABLET, FILM COATED ORAL at 21:02

## 2022-02-28 RX ADMIN — HYDROCODONE BITARTRATE AND ACETAMINOPHEN 1 TABLET: 5; 325 TABLET ORAL at 22:35

## 2022-02-28 RX ADMIN — SODIUM CHLORIDE 125 ML/HR: 9 INJECTION, SOLUTION INTRAVENOUS at 13:59

## 2022-02-28 RX ADMIN — ACETAMINOPHEN 650 MG: 325 TABLET ORAL at 16:58

## 2022-02-28 RX ADMIN — SODIUM CHLORIDE 125 ML/HR: 9 INJECTION, SOLUTION INTRAVENOUS at 21:03

## 2022-02-28 RX ADMIN — ACETAMINOPHEN 650 MG: 325 TABLET, FILM COATED ORAL at 16:58

## 2022-02-28 RX ADMIN — DIPHENHYDRAMINE HYDROCHLORIDE AND LIDOCAINE HYDROCHLORIDE AND ALUMINUM HYDROXIDE AND MAGNESIUM HYDRO 5 ML: KIT at 22:48

## 2022-02-28 RX ADMIN — DIAZEPAM 5 MG: 5 TABLET ORAL at 21:15

## 2022-03-01 ENCOUNTER — APPOINTMENT (OUTPATIENT)
Dept: NUCLEAR MEDICINE | Facility: HOSPITAL | Age: 72
End: 2022-03-01

## 2022-03-01 ENCOUNTER — APPOINTMENT (OUTPATIENT)
Dept: CARDIOLOGY | Facility: HOSPITAL | Age: 72
End: 2022-03-01

## 2022-03-01 VITALS
TEMPERATURE: 98 F | WEIGHT: 158.6 LBS | HEIGHT: 59 IN | HEART RATE: 54 BPM | OXYGEN SATURATION: 94 % | SYSTOLIC BLOOD PRESSURE: 136 MMHG | RESPIRATION RATE: 18 BRPM | DIASTOLIC BLOOD PRESSURE: 69 MMHG | BODY MASS INDEX: 31.97 KG/M2

## 2022-03-01 PROBLEM — R07.2 PRECORDIAL PAIN: Status: RESOLVED | Noted: 2019-11-12 | Resolved: 2022-03-01

## 2022-03-01 LAB
ALBUMIN SERPL-MCNC: 3.9 G/DL (ref 3.5–5.2)
ALBUMIN/GLOB SERPL: 2 G/DL
ALP SERPL-CCNC: 114 U/L (ref 39–117)
ALT SERPL W P-5'-P-CCNC: 21 U/L (ref 1–33)
ANION GAP SERPL CALCULATED.3IONS-SCNC: 9 MMOL/L (ref 5–15)
AST SERPL-CCNC: 21 U/L (ref 1–32)
BASOPHILS # BLD AUTO: 0.05 10*3/MM3 (ref 0–0.2)
BASOPHILS NFR BLD AUTO: 0.7 % (ref 0–1.5)
BH CV REST NUCLEAR ISOTOPE DOSE: 10.8 MCI
BH CV STRESS BP STAGE 1: NORMAL
BH CV STRESS COMMENTS STAGE 1: NORMAL
BH CV STRESS DOSE REGADENOSON STAGE 1: 0.4
BH CV STRESS DURATION MIN STAGE 1: 0
BH CV STRESS DURATION SEC STAGE 1: 10
BH CV STRESS HR STAGE 1: 61
BH CV STRESS NUCLEAR ISOTOPE DOSE: 35.7 MCI
BH CV STRESS PROTOCOL 1: NORMAL
BH CV STRESS RECOVERY BP: NORMAL MMHG
BH CV STRESS RECOVERY HR: 67 BPM
BH CV STRESS STAGE 1: 1
BILIRUB SERPL-MCNC: 0.5 MG/DL (ref 0–1.2)
BUN SERPL-MCNC: 8 MG/DL (ref 8–23)
BUN/CREAT SERPL: 11.3 (ref 7–25)
CALCIUM SPEC-SCNC: 8.6 MG/DL (ref 8.6–10.5)
CHLORIDE SERPL-SCNC: 105 MMOL/L (ref 98–107)
CHOLEST SERPL-MCNC: 141 MG/DL (ref 0–200)
CK SERPL-CCNC: 70 U/L (ref 20–180)
CO2 SERPL-SCNC: 22 MMOL/L (ref 22–29)
CREAT SERPL-MCNC: 0.71 MG/DL (ref 0.57–1)
DEPRECATED RDW RBC AUTO: 41.2 FL (ref 37–54)
EGFRCR SERPLBLD CKD-EPI 2021: 91 ML/MIN/1.73
EOSINOPHIL # BLD AUTO: 0.27 10*3/MM3 (ref 0–0.4)
EOSINOPHIL NFR BLD AUTO: 3.6 % (ref 0.3–6.2)
ERYTHROCYTE [DISTWIDTH] IN BLOOD BY AUTOMATED COUNT: 13.2 % (ref 12.3–15.4)
GLOBULIN UR ELPH-MCNC: 2 GM/DL
GLUCOSE SERPL-MCNC: 107 MG/DL (ref 65–99)
HBA1C MFR BLD: 5.7 % (ref 4.8–5.6)
HCT VFR BLD AUTO: 37.4 % (ref 34–46.6)
HDLC SERPL-MCNC: 35 MG/DL (ref 40–60)
HGB BLD-MCNC: 12.5 G/DL (ref 12–15.9)
IMM GRANULOCYTES # BLD AUTO: 0.04 10*3/MM3 (ref 0–0.05)
IMM GRANULOCYTES NFR BLD AUTO: 0.5 % (ref 0–0.5)
LDLC SERPL CALC-MCNC: 78 MG/DL (ref 0–100)
LDLC/HDLC SERPL: 2.1 {RATIO}
LV EF NUC BP: 70 %
LYMPHOCYTES # BLD AUTO: 2 10*3/MM3 (ref 0.7–3.1)
LYMPHOCYTES NFR BLD AUTO: 26.3 % (ref 19.6–45.3)
MAGNESIUM SERPL-MCNC: 1.8 MG/DL (ref 1.6–2.4)
MAXIMAL PREDICTED HEART RATE: 149 BPM
MCH RBC QN AUTO: 28.9 PG (ref 26.6–33)
MCHC RBC AUTO-ENTMCNC: 33.4 G/DL (ref 31.5–35.7)
MCV RBC AUTO: 86.4 FL (ref 79–97)
MONOCYTES # BLD AUTO: 0.61 10*3/MM3 (ref 0.1–0.9)
MONOCYTES NFR BLD AUTO: 8 % (ref 5–12)
NEUTROPHILS NFR BLD AUTO: 4.63 10*3/MM3 (ref 1.7–7)
NEUTROPHILS NFR BLD AUTO: 60.9 % (ref 42.7–76)
NRBC BLD AUTO-RTO: 0 /100 WBC (ref 0–0.2)
PERCENT MAX PREDICTED HR: 53.69 %
PHOSPHATE SERPL-MCNC: 3.8 MG/DL (ref 2.5–4.5)
PLATELET # BLD AUTO: 171 10*3/MM3 (ref 140–450)
PMV BLD AUTO: 10.7 FL (ref 6–12)
POTASSIUM SERPL-SCNC: 3.6 MMOL/L (ref 3.5–5.2)
PROCALCITONIN SERPL-MCNC: 0.05 NG/ML (ref 0–0.25)
PROT SERPL-MCNC: 5.9 G/DL (ref 6–8.5)
RBC # BLD AUTO: 4.33 10*6/MM3 (ref 3.77–5.28)
SODIUM SERPL-SCNC: 136 MMOL/L (ref 136–145)
STRESS BASELINE BP: NORMAL MMHG
STRESS BASELINE HR: 62 BPM
STRESS PERCENT HR: 63 %
STRESS POST ESTIMATED WORKLOAD: 1 METS
STRESS POST PEAK BP: NORMAL MMHG
STRESS POST PEAK HR: 80 BPM
STRESS TARGET HR: 127 BPM
T4 FREE SERPL-MCNC: 1.81 NG/DL (ref 0.93–1.7)
TRIGL SERPL-MCNC: 162 MG/DL (ref 0–150)
TROPONIN T SERPL-MCNC: <0.01 NG/ML (ref 0–0.03)
TROPONIN T SERPL-MCNC: <0.01 NG/ML (ref 0–0.03)
TSH SERPL DL<=0.05 MIU/L-ACNC: 0.32 UIU/ML (ref 0.27–4.2)
VLDLC SERPL-MCNC: 28 MG/DL (ref 5–40)
WBC NRBC COR # BLD: 7.6 10*3/MM3 (ref 3.4–10.8)

## 2022-03-01 PROCEDURE — 93017 CV STRESS TEST TRACING ONLY: CPT

## 2022-03-01 PROCEDURE — 84439 ASSAY OF FREE THYROXINE: CPT | Performed by: HOSPITALIST

## 2022-03-01 PROCEDURE — 80053 COMPREHEN METABOLIC PANEL: CPT | Performed by: HOSPITALIST

## 2022-03-01 PROCEDURE — A9500 TC99M SESTAMIBI: HCPCS | Performed by: HOSPITALIST

## 2022-03-01 PROCEDURE — 93010 ELECTROCARDIOGRAM REPORT: CPT | Performed by: INTERNAL MEDICINE

## 2022-03-01 PROCEDURE — 25010000002 REGADENOSON 0.4 MG/5ML SOLUTION: Performed by: HOSPITALIST

## 2022-03-01 PROCEDURE — 84100 ASSAY OF PHOSPHORUS: CPT | Performed by: HOSPITALIST

## 2022-03-01 PROCEDURE — 80061 LIPID PANEL: CPT | Performed by: HOSPITALIST

## 2022-03-01 PROCEDURE — 78452 HT MUSCLE IMAGE SPECT MULT: CPT

## 2022-03-01 PROCEDURE — 83735 ASSAY OF MAGNESIUM: CPT | Performed by: HOSPITALIST

## 2022-03-01 PROCEDURE — 84484 ASSAY OF TROPONIN QUANT: CPT | Performed by: HOSPITALIST

## 2022-03-01 PROCEDURE — 84145 PROCALCITONIN (PCT): CPT | Performed by: HOSPITALIST

## 2022-03-01 PROCEDURE — 85025 COMPLETE CBC W/AUTO DIFF WBC: CPT | Performed by: HOSPITALIST

## 2022-03-01 PROCEDURE — 84443 ASSAY THYROID STIM HORMONE: CPT | Performed by: HOSPITALIST

## 2022-03-01 PROCEDURE — 78452 HT MUSCLE IMAGE SPECT MULT: CPT | Performed by: INTERNAL MEDICINE

## 2022-03-01 PROCEDURE — 93016 CV STRESS TEST SUPVJ ONLY: CPT | Performed by: INTERNAL MEDICINE

## 2022-03-01 PROCEDURE — G0378 HOSPITAL OBSERVATION PER HR: HCPCS

## 2022-03-01 PROCEDURE — 83036 HEMOGLOBIN GLYCOSYLATED A1C: CPT | Performed by: HOSPITALIST

## 2022-03-01 PROCEDURE — 0 TECHNETIUM SESTAMIBI: Performed by: HOSPITALIST

## 2022-03-01 PROCEDURE — 93005 ELECTROCARDIOGRAM TRACING: CPT | Performed by: HOSPITALIST

## 2022-03-01 PROCEDURE — 82550 ASSAY OF CK (CPK): CPT | Performed by: HOSPITALIST

## 2022-03-01 PROCEDURE — 93018 CV STRESS TEST I&R ONLY: CPT | Performed by: INTERNAL MEDICINE

## 2022-03-01 RX ORDER — SODIUM CHLORIDE 0.9 % (FLUSH) 0.9 %
10 SYRINGE (ML) INJECTION ONCE
Status: COMPLETED | OUTPATIENT
Start: 2022-03-01 | End: 2022-03-01

## 2022-03-01 RX ADMIN — Medication 10 ML: at 10:00

## 2022-03-01 RX ADMIN — DIPHENHYDRAMINE HYDROCHLORIDE AND LIDOCAINE HYDROCHLORIDE AND ALUMINUM HYDROXIDE AND MAGNESIUM HYDRO 5 ML: KIT at 13:16

## 2022-03-01 RX ADMIN — ATORVASTATIN CALCIUM 40 MG: 40 TABLET, FILM COATED ORAL at 11:13

## 2022-03-01 RX ADMIN — TECHNETIUM TC 99M SESTAMIBI 1 DOSE: 1 INJECTION INTRAVENOUS at 10:01

## 2022-03-01 RX ADMIN — Medication 10 ML: at 11:12

## 2022-03-01 RX ADMIN — DIPHENHYDRAMINE HYDROCHLORIDE AND LIDOCAINE HYDROCHLORIDE AND ALUMINUM HYDROXIDE AND MAGNESIUM HYDRO 5 ML: KIT at 06:01

## 2022-03-01 RX ADMIN — LEVOTHYROXINE SODIUM 125 MCG: 125 TABLET ORAL at 06:01

## 2022-03-01 RX ADMIN — REGADENOSON 0.4 MG: 0.08 INJECTION, SOLUTION INTRAVENOUS at 09:59

## 2022-03-01 RX ADMIN — HYDROCODONE BITARTRATE AND ACETAMINOPHEN 1 TABLET: 5; 325 TABLET ORAL at 04:12

## 2022-03-01 RX ADMIN — ASPIRIN 81 MG: 81 TABLET, FILM COATED ORAL at 11:13

## 2022-03-01 RX ADMIN — PANTOPRAZOLE SODIUM 40 MG: 40 TABLET, DELAYED RELEASE ORAL at 06:01

## 2022-03-01 RX ADMIN — DOCUSATE SODIUM 100 MG: 100 CAPSULE, LIQUID FILLED ORAL at 11:13

## 2022-03-01 RX ADMIN — TECHNETIUM TC 99M SESTAMIBI 1 DOSE: 1 INJECTION INTRAVENOUS at 08:58

## 2022-03-01 RX ADMIN — GABAPENTIN 100 MG: 100 CAPSULE ORAL at 11:12

## 2022-03-01 RX ADMIN — HYDROCODONE BITARTRATE AND ACETAMINOPHEN 1 TABLET: 5; 325 TABLET ORAL at 13:24

## 2022-03-01 RX ADMIN — GABAPENTIN 100 MG: 100 CAPSULE ORAL at 15:51

## 2022-03-01 NOTE — DISCHARGE SUMMARY
[facility] Medicine Services  DISCHARGE SUMMARY       Date of Admission: 2/28/2022  Date of Discharge:  3/1/2022  Primary Care Physician: Anali Sheppard DO    Presenting Problem/History of Present Illness:  Precordial pain [R07.2]       Final Discharge Diagnoses:  Active Hospital Problems    Diagnosis    • CVA (cerebral vascular accident) (HCC)    • Essential hypertension    • Hypothyroidism    • PVD (peripheral vascular disease) (HCC)        Consults:   Consults     Date and Time Order Name Status Description    2/28/2022  4:14 PM Hospitalist (on-call MD unless specified)            Procedures Performed:  none                Pertinent Test Results:   Imaging Results (Last 7 Days)     Procedure Component Value Units Date/Time    CT Soft Tissue Neck With Contrast [[id number]] Collected: 02/28/22 1422     Updated: 02/28/22 1516    Narrative:      Procedure: CT soft tissue neck with contrast    Reason for exam: Right neck pain and swelling unspecified.    FINDINGS: Axial computed tomography sequential imaging was  performed from the superior orbits through the thoracic inlet  after dynamic bolus contrast injection. Sagittal and coronal  reformation was performed. This exam was performed according to  our departmental dose optimization program, which includes  automated exposure control, adjustment of the mA and/or KV  according to patient size and/or use of iterative reconstruction  technique.    Visualized intracranial structures reveals left posterior  frontal, left parietal, and left temporal lobe focus of gray and  white matter hypodensity with associated negative mass effect on  the adjacent ventricle. Visualized intracranial structures are  otherwise unremarkable. Paranasal sinuses and bilateral mastoid  air cells are well-aerated. Intraorbital structures are normal.  Bilateral salivary glands are normal. The nasopharynx,  oropharynx, and larynx are normal. Facial  structures are normal.  No lymphadenopathy in the region of the neck. No acute osseous  abnormality. Congenital block vertebrae of the C2-3 vertebral  body with bilateral facet bony fusion. Lung apices reveal mild  centrilobular emphysematous changes.      Impression:      1.  Old left posterior frontal, left parietal, and left temporal  lobe infarct.  2.  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   3.  Otherwise unremarkable CT of the neck.    Electronically signed by:  Francisco Pacheco MD  2/28/2022 3:14 PM CST  Workstation: [id number]    XR Chest 1 View [[id number]] Collected: 02/28/22 1250     Updated: 02/28/22 1351    Narrative:      EXAM DESCRIPTION:     XR CHEST 1 VW    CLINICAL HISTORY:     72 years  Female  Chest Pain triage protocol  Chest Pain Triage Protocol    COMPARISON:     December 2014    TECHNIQUE:     One view-AP portable radiograph the chest    FINDINGS:     The lungs are well-expanded. Small calcified granulomas are  noted. There are no acute infiltrates. The cardiac silhouette and  pulmonary vasculature are within normal limits. There are no  definitive pleural effusions. The lung bases are not optimally  evaluated on portable exam.        Impression:          1. No radiographic evidence of acute cardiopulmonary disease.        Electronically signed by:  Mariza Kelsey MD  2/28/2022 1:49 PM  CST Workstation: [id number]        Lab Results (last 24 hours)     Procedure Component Value Units Date/Time    TSH [[id number]]  (Normal) Collected: 03/01/22 0639    Specimen: Blood Updated: 03/01/22 0733     TSH 0.318 uIU/mL     Troponin [[id number]]  (Normal) Collected: 03/01/22 0639    Specimen: Blood Updated: 03/01/22 0733     Troponin T <0.010 ng/mL     Narrative:      Troponin T Reference Range:  <= 0.03 ng/mL-   Negative for AMI  >0.03 ng/mL-     Abnormal for myocardial necrosis.  Clinicians would have to utilize clinical acumen, EKG, Troponin and serial changes  "to determine if it is an Acute Myocardial Infarction or myocardial injury due to an underlying chronic condition.       Results may be falsely decreased if patient taking Biotin.      T4, Free [[id number]]  (Abnormal) Collected: 03/01/22 0639    Specimen: Blood Updated: 03/01/22 0733     Free T4 1.81 ng/dL     Narrative:      Results may be falsely increased if patient taking Biotin.      Procalcitonin [[id number]]  (Normal) Collected: 03/01/22 0639    Specimen: Blood Updated: 03/01/22 0731     Procalcitonin 0.05 ng/mL     Narrative:      As a Marker for Sepsis (Non-Neonates):     1. <0.5 ng/mL represents a low risk of severe sepsis and/or septic shock.  2. >2 ng/mL represents a high risk of severe sepsis and/or septic shock.    As a Marker for Lower Respiratory Tract Infections that require antibiotic therapy:  PCT on Admission     Antibiotic Therapy             6-12 Hrs later  >0.5                          Strongly Recommended            >0.25 - <0.5             Recommended  0.1 - 0.25                  Discouraged                       Remeasure/reassess PCT  <0.1                         Strongly Discouraged         Remeasure/reassess PCT      As 28 day mortality risk marker: \"Change in Procalcitonin Result\" (>80% or <=80%) if Day 0 (or Day 1) and Day 4 values are available. Refer to http://www.[facility]-pct-calculator.com/    Change in PCT <=80 %   A decrease of PCT levels below or equal to 80% defines a positive change in PCT test result representing a higher risk for 28-day all-cause mortality of patients diagnosed with severe sepsis or septic shock.    Change in PCT >80 %   A decrease of PCT levels of more than 80% defines a negative change in PCT result representing a lower risk for 28-day all-cause mortality of patients diagnosed with severe sepsis or septic shock.                Comprehensive Metabolic Panel [[id number]]  (Abnormal) Collected: 03/01/22 0639    Specimen: Blood Updated: 03/01/22 0730     Glucose " 107 mg/dL      BUN 8 mg/dL      Creatinine 0.71 mg/dL      Sodium 136 mmol/L      Potassium 3.6 mmol/L      Chloride 105 mmol/L      CO2 22.0 mmol/L      Calcium 8.6 mg/dL      Total Protein 5.9 g/dL      Albumin 3.90 g/dL      ALT (SGPT) 21 U/L      AST (SGOT) 21 U/L      Alkaline Phosphatase 114 U/L      Total Bilirubin 0.5 mg/dL      Globulin 2.0 gm/dL      A/G Ratio 2.0 g/dL      BUN/Creatinine Ratio 11.3     Anion Gap 9.0 mmol/L      eGFR 91.0 mL/min/1.73      Comment: National Kidney Foundation and American Society of Nephrology (ASN) Task Force recommended calculation based on the Chronic Kidney Disease Epidemiology Collaboration (CKD-EPI) equation refit without adjustment for race.       Narrative:      GFR Normal >60  Chronic Kidney Disease <60  Kidney Failure <15      Lipid Panel [[id number]]  (Abnormal) Collected: 03/01/22 0639    Specimen: Blood Updated: 03/01/22 0730     Total Cholesterol 141 mg/dL      Triglycerides 162 mg/dL      HDL Cholesterol 35 mg/dL      LDL Cholesterol  78 mg/dL      VLDL Cholesterol 28 mg/dL      LDL/HDL Ratio 2.10    Narrative:      Cholesterol Reference Ranges  (U.S. Department of Health and Human Services ATP III Classifications)    Desirable          <200 mg/dL  Borderline High    200-239 mg/dL  High Risk          >240 mg/dL      Triglyceride Reference Ranges  (U.S. Department of Health and Human Services ATP III Classifications)    Normal           <150 mg/dL  Borderline High  150-199 mg/dL  High             200-499 mg/dL  Very High        >500 mg/dL    HDL Reference Ranges  (U.S. Department of Health and Human Services ATP III Classifications)    Low     <40 mg/dl (major risk factor for CHD)  High    >60 mg/dl ('negative' risk factor for CHD)        LDL Reference Ranges  (U.S. Department of Health and Human Services ATP III Classifications)    Optimal          <100 mg/dL  Near Optimal     100-129 mg/dL  Borderline High  130-159 mg/dL  High             160-189 mg/dL  Very  High        >189 mg/dL    CK [[id number]]  (Normal) Collected: 03/01/22 0639    Specimen: Blood Updated: 03/01/22 0730     Creatine Kinase 70 U/L     Phosphorus [[id number]]  (Normal) Collected: 03/01/22 0639    Specimen: Blood Updated: 03/01/22 0730     Phosphorus 3.8 mg/dL     Magnesium [[id number]]  (Normal) Collected: 03/01/22 0639    Specimen: Blood Updated: 03/01/22 0730     Magnesium 1.8 mg/dL     Hemoglobin A1c [[id number]]  (Abnormal) Collected: 03/01/22 0639    Specimen: Blood Updated: 03/01/22 0716     Hemoglobin A1C 5.70 %     Narrative:      Hemoglobin A1C Ranges:    Increased Risk for Diabetes  5.7% to 6.4%  Diabetes                     >= 6.5%  Diabetic Goal                < 7.0%    CBC Auto Differential [[id number]]  (Normal) Collected: 03/01/22 0639    Specimen: Blood Updated: 03/01/22 0700     WBC 7.60 10*3/mm3      RBC 4.33 10*6/mm3      Hemoglobin 12.5 g/dL      Hematocrit 37.4 %      MCV 86.4 fL      MCH 28.9 pg      MCHC 33.4 g/dL      RDW 13.2 %      RDW-SD 41.2 fl      MPV 10.7 fL      Platelets 171 10*3/mm3      Neutrophil % 60.9 %      Lymphocyte % 26.3 %      Monocyte % 8.0 %      Eosinophil % 3.6 %      Basophil % 0.7 %      Immature Grans % 0.5 %      Neutrophils, Absolute 4.63 10*3/mm3      Lymphocytes, Absolute 2.00 10*3/mm3      Monocytes, Absolute 0.61 10*3/mm3      Eosinophils, Absolute 0.27 10*3/mm3      Basophils, Absolute 0.05 10*3/mm3      Immature Grans, Absolute 0.04 10*3/mm3      nRBC 0.0 /100 WBC     Troponin [[id number]]  (Normal) Collected: 03/01/22 0013    Specimen: Blood Updated: 03/01/22 0057     Troponin T <0.010 ng/mL     Narrative:      Troponin T Reference Range:  <= 0.03 ng/mL-   Negative for AMI  >0.03 ng/mL-     Abnormal for myocardial necrosis.  Clinicians would have to utilize clinical acumen, EKG, Troponin and serial changes to determine if it is an Acute Myocardial Infarction or myocardial injury due to an underlying chronic condition.       Results may be falsely  decreased if patient taking Biotin.      COVID-19 and FLU A/B PCR - Swab, Nasopharynx [[id number]]  (Normal) Collected: 02/28/22 1632    Specimen: Swab from Nasopharynx Updated: 02/28/22 1701     COVID19 Not Detected     Influenza A PCR Not Detected     Influenza B PCR Not Detected    Narrative:      Fact sheet for providers: https://www.fda.gov/media/430118/download    Fact sheet for patients: https://www.fda.gov/media/894409/download    Test performed by PCR.    Troponin [[id number]]  (Normal) Collected: 02/28/22 1514    Specimen: Blood Updated: 02/28/22 1539     Troponin T <0.010 ng/mL     Narrative:      Troponin T Reference Range:  <= 0.03 ng/mL-   Negative for AMI  >0.03 ng/mL-     Abnormal for myocardial necrosis.  Clinicians would have to utilize clinical acumen, EKG, Troponin and serial changes to determine if it is an Acute Myocardial Infarction or myocardial injury due to an underlying chronic condition.       Results may be falsely decreased if patient taking Biotin.              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  The patient also has history of right-sided pain mostly temporal head, right jaw neck and thoracic back that is thought to be idiopathic neuropathy and is on Lortab and gabapentin for it.     Apparently, the patient had complained to her family of ear pain, right neck pain and right upper chest pain thus was brought to the ED on 2/28/2022.  The patient is unable to describe her symptoms in detail due to severe aphasia.  Chest x-ray in the ED ruled out acute cardiopulmonary disease.      The patient was then placed on observation.  Acute MI was ruled out with serial cardiac enzymes.  She underwent stress test in the morning of 3/1/2022 and it is  "negative for ischemia.  Chest pain is likely noncardiac.  She will be discharged home in the afternoon of 3/1/2022.      Condition on Discharge: Hemodynamically stable    Physical Exam on Discharge:  /69 (BP Location: Right arm, Patient Position: Lying)   Pulse 54   Temp 98 °F (36.7 °C) (Temporal)   Resp 18   Ht 149.9 cm (59\")   Wt 71.9 kg (158 lb 9.6 oz)   SpO2 94%   BMI 32.03 kg/m²     Gen.: Appears as stated age.  No acute distress.  HEENT: EOMI.  PERRLA.  Sclerae anicteric.  Moist mucous membranes.  Neck: Supple.  No JVD.  No thyromegaly.  Chest: Clear to auscultation bilaterally.  No wheeze, rhonchi or rales.  Heart: Regular rhythm and rate.  No murmurs, rubs or gallops.  Abdomen: Normoactive bowel sounds.  Nontender.  Nondistended.  No guarding.   Neurological: Cranial nerves II through XII are grossly intact.  No cerebellar signs.   Extremities: Good range of motion throughout.  No cyanosis, clubbing or edema.  Skin: Warm.  No rashes.  No ulcers.  Psychiatry: Cooperative.        Discharge Disposition:  Home or Self Care    Discharge Medications:     Discharge Medications      Continue These Medications      Instructions Start Date   aspirin 81 MG EC tablet   81 mg, Oral, Daily      atorvastatin 20 MG tablet  Commonly known as: LIPITOR   40 mg, Oral, Daily      Breo Ellipta 200-25 MCG/INH inhaler  Generic drug: Fluticasone Furoate-Vilanterol   INHALE 1 PUFF BY MOUTH DAILY      diazePAM 5 MG tablet  Commonly known as: VALIUM   5 mg, Oral, Nightly PRN      DIPHENHYD-LIDOCAINE-NYSTATIN MT   Mouth/Throat, 4 Times Daily PRN      docusate sodium 100 MG capsule  Commonly known as: COLACE   100 mg, Oral, Daily PRN      escitalopram 20 MG tablet  Commonly known as: LEXAPRO   20 mg, Oral, Nightly      furosemide 20 MG tablet  Commonly known as: LASIX   20 mg, Oral, Daily      gabapentin 100 MG capsule  Commonly known as: NEURONTIN   100 mg, Oral, 3 Times Daily      HYDROcodone-acetaminophen 5-325 MG per " tablet  Commonly known as: NORCO   1 tablet, Oral, Every 6 Hours PRN      hydrOXYzine 25 MG tablet  Commonly known as: ATARAX   25 mg, Oral, 3 Times Daily PRN      levothyroxine 75 MCG tablet  Commonly known as: SYNTHROID, LEVOTHROID   125 mcg, Oral, Daily   Start Date: April 11, 2106     losartan 50 MG tablet  Commonly known as: COZAAR   50 mg, Oral, Daily, Take in PM      metoprolol succinate XL 50 MG 24 hr tablet  Commonly known as: TOPROL-XL   25 mg, Oral, Nightly      omeprazole 40 MG capsule  Commonly known as: priLOSEC   40 mg, Oral, Daily      tiZANidine 4 MG tablet  Commonly known as: ZANAFLEX   4 mg, Oral, Nightly PRN, 1 and 1/2 tablet by mouth 3 times a day      Vitamin D3 50 MCG (2000 UT) tablet   1 tablet, Oral, Daily             Discharge Diet: Healthy    Activity at Discharge: As tolerated    Discharge Care Plan/Instructions:    1.  Follow-up with PCP in 2 weeks    Follow-up Appointments:   No future appointments.    Test Results Pending at Discharge:     Abisai Olea DO  03/01/22  14:30 CST

## 2022-03-01 NOTE — ED NOTES
Tele is able to see pt on monitor   [id number]  Omer Monroy, RIKA  02/28/22 1854       Omer Monroy, RN  02/28/22 1856

## 2022-03-02 ENCOUNTER — READMISSION MANAGEMENT (OUTPATIENT)
Dept: CALL CENTER | Facility: HOSPITAL | Age: 72
End: 2022-03-02

## 2022-03-02 LAB
QT INTERVAL: 448 MS
QTC INTERVAL: 416 MS

## 2022-03-02 NOTE — OUTREACH NOTE
Prep Survey      Responses   [facility] patient discharged from? [place]   Is LACE score < 7 ? Yes   Emergency Room discharge w/ pulse ox? No   Eligibility Readm Mgmt   Discharge diagnosis CVA    Does the patient have one of the following disease processes/diagnoses(primary or secondary)? Stroke (TIA)   Does the patient have Home health ordered? No   Is there a DME ordered? No   Prep survey completed? Yes          Saadia Brandon RN

## 2022-03-04 ENCOUNTER — READMISSION MANAGEMENT (OUTPATIENT)
Dept: CALL CENTER | Facility: HOSPITAL | Age: 72
End: 2022-03-04

## 2022-03-05 NOTE — OUTREACH NOTE
Stroke Week 1 Survey      Responses   [facility] patient discharged from? [place]   Does the patient have one of the following disease processes/diagnoses(primary or secondary)? Stroke (TIA)   Week 1 attempt successful? Yes   Call start time 1806   Call end time 1809   Discharge diagnosis CVA    Is patient permission given to speak with other caregiver? Yes   List who call center can speak with Everton Feliz   Person spoke with today (if not patient) and relationship Eryn   Meds reviewed with patient/caregiver? Yes   Is the patient having any side effects they believe may be caused by any medication additions or changes? No   Does the patient have all medications ordered at discharge? N/A   Is the patient taking all medications as directed (includes completed medication regime)? Yes   Does the patient have a primary care provider?  Yes   Does the patient have an appointment with their PCP within 7 days of discharge? Greater than 7 days   Comments regarding PCP PCP apptment 03/11/2022   What is preventing the patient from scheduling follow up appointments within 7 days of discharge? Earlier appointment not available   Has home health visited the patient within 72 hours of discharge? N/A   Has all DME been delivered? No   Psychosocial issues? No   Does the patient require any assistance with activities of daily living such as eating, bathing, dressing, walking, etc.? No   Does the patient have any residual symptoms from stroke/TIA? No   Does the patient understand the diet ordered at discharge? Yes   Did the patient receive a copy of their discharge instructions? Yes   Nursing interventions Reviewed instructions with patient   What is the patient's perception of their health status since discharge? Improving   Nursing interventions Nurse provided patient education   Is the patient able to teach back FAST for Stroke? Yes   Is the patient/caregiver able to teach back the risk factors for a stroke? High blood  pressure-goal below 120/80,  History of TIAs   Is the patient/caregiver able to teach back signs and symptoms related to disease process for when to call PCP? Yes   Is the patient/caregiver able to teach back signs and symptoms related to disease process for when to call 911? Yes   If the patient is a current smoker, are they able to teach back resources for cessation? Not a smoker   Is the patient/caregiver able to teach back the hierarchy of who to call/visit for symptoms/problems? PCP, Specialist, Home health nurse, Urgent Care, ED, 911 Yes   Week 1 call completed? Yes   Wrap up additional comments Son states she is improving. No needs identified.           Hilda Sam, RN

## 2022-03-15 ENCOUNTER — READMISSION MANAGEMENT (OUTPATIENT)
Dept: CALL CENTER | Facility: HOSPITAL | Age: 72
End: 2022-03-15

## 2022-03-15 NOTE — OUTREACH NOTE
Stroke Week 2 Survey    Flowsheet Row Responses   [facility] patient discharged from? [place]   Does the patient have one of the following disease processes/diagnoses(primary or secondary)? Stroke (TIA)   Week 2 attempt successful? Yes   Call start time 1331   Revoke Decline to participate  [ declines follow up calls. ]   Call end time 1333   Person spoke with today (if not patient) and relationship spouse, Mr Ramirez          BETHANY LEON - Registered Nurse

## 2022-04-03 LAB
QT INTERVAL: 452 MS
QT INTERVAL: 472 MS
QTC INTERVAL: 479 MS
QTC INTERVAL: 497 MS

## 2022-06-20 ENCOUNTER — HOME HEALTH ADMISSION (OUTPATIENT)
Dept: HOME HEALTH SERVICES | Facility: HOME HEALTHCARE | Age: 72
End: 2022-06-20

## 2022-06-20 ENCOUNTER — TRANSCRIBE ORDERS (OUTPATIENT)
Dept: HOME HEALTH SERVICES | Facility: HOME HEALTHCARE | Age: 72
End: 2022-06-20

## 2022-06-20 DIAGNOSIS — I69.30 SEQUELAE OF CEREBRAL INFARCTION: Primary | ICD-10-CM

## 2022-06-22 ENCOUNTER — HOME CARE VISIT (OUTPATIENT)
Dept: HOME HEALTH SERVICES | Facility: CLINIC | Age: 72
End: 2022-06-22

## 2022-06-22 PROCEDURE — G0153 HHCP-SVS OF S/L PATH,EA 15MN: HCPCS

## 2022-06-23 ENCOUNTER — HOME CARE VISIT (OUTPATIENT)
Dept: HOME HEALTH SERVICES | Facility: CLINIC | Age: 72
End: 2022-06-23

## 2022-06-23 VITALS
RESPIRATION RATE: 18 BRPM | OXYGEN SATURATION: 96 % | TEMPERATURE: 97.6 F | SYSTOLIC BLOOD PRESSURE: 132 MMHG | HEART RATE: 59 BPM | DIASTOLIC BLOOD PRESSURE: 80 MMHG

## 2022-06-23 VITALS
SYSTOLIC BLOOD PRESSURE: 140 MMHG | TEMPERATURE: 98 F | RESPIRATION RATE: 18 BRPM | DIASTOLIC BLOOD PRESSURE: 80 MMHG | OXYGEN SATURATION: 98 % | HEART RATE: 60 BPM

## 2022-06-23 PROCEDURE — G0151 HHCP-SERV OF PT,EA 15 MIN: HCPCS

## 2022-06-23 NOTE — HOME HEALTH
"REASON FOR REFERRAL: Patient referred to  PT by PCP due to increased R sided weakness with increased frequency of falls s/p CVA several years ago.  She reports increased  R-sided pain and R leg weakness but denies recent falls.  DIAGNOSIS: Weakness, abnormal gait  SURGICAL PROCEDURE: N/A  PERTINENT MEDICAL HISTORY: Hx CVA with R hemiparesis, HT, CAD, PVD, HTN  PRIOR LEVEL OF FUNCTION: I on level and uneven surfaces with SPC  PATIENT GOAL FOR THIS EPISODE OF CARE: \"Get around without hurting.\"  HOME SOCIAL ENVIRONMENT: Lives with spouse in single story home with several steps to enter"

## 2022-06-27 ENCOUNTER — HOME CARE VISIT (OUTPATIENT)
Dept: HOME HEALTH SERVICES | Facility: CLINIC | Age: 72
End: 2022-06-27

## 2022-06-29 NOTE — HOME HEALTH
Medical Necessity and focus of care: Pt with CVA resulting in severe aphasia impacting communication including receptive and expressive as well as gait deficits resulting in increased falls. Pt required skilled PT/ST to improve gait/ communication in and out of the home

## 2022-06-30 ENCOUNTER — HOME CARE VISIT (OUTPATIENT)
Dept: HOME HEALTH SERVICES | Facility: CLINIC | Age: 72
End: 2022-06-30

## 2022-06-30 VITALS
RESPIRATION RATE: 18 BRPM | SYSTOLIC BLOOD PRESSURE: 136 MMHG | TEMPERATURE: 98.2 F | HEART RATE: 56 BPM | OXYGEN SATURATION: 95 % | DIASTOLIC BLOOD PRESSURE: 84 MMHG

## 2022-06-30 PROCEDURE — G0157 HHC PT ASSISTANT EA 15: HCPCS

## 2022-06-30 PROCEDURE — G0153 HHCP-SVS OF S/L PATH,EA 15MN: HCPCS

## 2022-06-30 NOTE — HOME HEALTH
pt sitting on couch w/ spouse present and reports HH ST Mandy Arias present just a few minutes earlier; pts spouse answers most of pts questions due to pts difficulty speaking; pt able to answer simple questions Ind.

## 2022-07-01 VITALS
OXYGEN SATURATION: 98 % | DIASTOLIC BLOOD PRESSURE: 93 MMHG | HEART RATE: 68 BPM | RESPIRATION RATE: 17 BRPM | SYSTOLIC BLOOD PRESSURE: 166 MMHG | TEMPERATURE: 98 F

## 2022-07-01 NOTE — HOME HEALTH
Subjective:  Pt with a CVA with left MCA.  Pt with CVA January 2021.      Medical necessity:  Pt with communication deficits impacting independence.  Pt required skilled ST to implement strateiges to improve communication and safety.

## 2022-07-06 ENCOUNTER — HOME CARE VISIT (OUTPATIENT)
Dept: HOME HEALTH SERVICES | Facility: CLINIC | Age: 72
End: 2022-07-06

## 2022-07-06 PROCEDURE — G0153 HHCP-SVS OF S/L PATH,EA 15MN: HCPCS

## 2022-07-07 VITALS
TEMPERATURE: 98 F | RESPIRATION RATE: 17 BRPM | OXYGEN SATURATION: 98 % | DIASTOLIC BLOOD PRESSURE: 76 MMHG | SYSTOLIC BLOOD PRESSURE: 112 MMHG | HEART RATE: 66 BPM

## 2022-07-07 NOTE — HOME HEALTH
Subjective:  Pt was doing well this date per report.  Pt had lost power for awhile but it had come back on.      Medical necessity:  Pt with aphasia resulting in limited communication.  Pt required skilled ST to implement strategies to improve speech/communication.

## 2023-01-08 ENCOUNTER — APPOINTMENT (OUTPATIENT)
Dept: CT IMAGING | Facility: HOSPITAL | Age: 73
DRG: 690 | End: 2023-01-08
Payer: MEDICARE

## 2023-01-08 ENCOUNTER — APPOINTMENT (OUTPATIENT)
Dept: GENERAL RADIOLOGY | Facility: HOSPITAL | Age: 73
DRG: 690 | End: 2023-01-08
Payer: MEDICARE

## 2023-01-08 ENCOUNTER — HOSPITAL ENCOUNTER (INPATIENT)
Facility: HOSPITAL | Age: 73
LOS: 2 days | Discharge: HOME HEALTH | DRG: 690 | End: 2023-01-11
Attending: STUDENT IN AN ORGANIZED HEALTH CARE EDUCATION/TRAINING PROGRAM | Admitting: INTERNAL MEDICINE
Payer: MEDICARE

## 2023-01-08 DIAGNOSIS — Z78.9 IMPAIRED MOBILITY AND ADLS: ICD-10-CM

## 2023-01-08 DIAGNOSIS — R31.9 URINARY TRACT INFECTION WITH HEMATURIA, SITE UNSPECIFIED: ICD-10-CM

## 2023-01-08 DIAGNOSIS — Z98.61 HISTORY OF PTCA: ICD-10-CM

## 2023-01-08 DIAGNOSIS — I63.9 CEREBROVASCULAR ACCIDENT (CVA), UNSPECIFIED MECHANISM: ICD-10-CM

## 2023-01-08 DIAGNOSIS — I10 BENIGN HYPERTENSION: ICD-10-CM

## 2023-01-08 DIAGNOSIS — I67.848 OTHER CEREBROVASCULAR VASOSPASM AND VASOCONSTRICTION: ICD-10-CM

## 2023-01-08 DIAGNOSIS — R47.1 DYSARTHRIA: Primary | ICD-10-CM

## 2023-01-08 DIAGNOSIS — N39.0 URINARY TRACT INFECTION WITH HEMATURIA, SITE UNSPECIFIED: ICD-10-CM

## 2023-01-08 DIAGNOSIS — Z74.09 IMPAIRED MOBILITY AND ADLS: ICD-10-CM

## 2023-01-08 DIAGNOSIS — R09.89 SUSPECTED CEREBROVASCULAR ACCIDENT (CVA): ICD-10-CM

## 2023-01-08 DIAGNOSIS — I63.312 CEREBROVASCULAR ACCIDENT (CVA) DUE TO THROMBOSIS OF LEFT MIDDLE CEREBRAL ARTERY: ICD-10-CM

## 2023-01-08 DIAGNOSIS — Z74.09 IMPAIRED FUNCTIONAL MOBILITY, BALANCE, GAIT, AND ENDURANCE: ICD-10-CM

## 2023-01-08 DIAGNOSIS — I25.10 ATHEROSCLEROSIS OF NATIVE CORONARY ARTERY OF NATIVE HEART, UNSPECIFIED WHETHER ANGINA PRESENT: ICD-10-CM

## 2023-01-08 LAB
ALBUMIN SERPL-MCNC: 4.1 G/DL (ref 3.5–5.2)
ALBUMIN/GLOB SERPL: 1.3 G/DL
ALP SERPL-CCNC: 143 U/L (ref 39–117)
ALT SERPL W P-5'-P-CCNC: 26 U/L (ref 1–33)
ANION GAP SERPL CALCULATED.3IONS-SCNC: 12 MMOL/L (ref 5–15)
APTT PPP: 33.4 SECONDS (ref 20–40.3)
AST SERPL-CCNC: 25 U/L (ref 1–32)
BACTERIA UR QL AUTO: ABNORMAL /HPF
BASOPHILS # BLD AUTO: 0.07 10*3/MM3 (ref 0–0.2)
BASOPHILS NFR BLD AUTO: 0.8 % (ref 0–1.5)
BILIRUB SERPL-MCNC: 0.6 MG/DL (ref 0–1.2)
BILIRUB UR QL STRIP: NEGATIVE
BUN SERPL-MCNC: 8 MG/DL (ref 8–23)
BUN/CREAT SERPL: 9.8 (ref 7–25)
CALCIUM SPEC-SCNC: 9.4 MG/DL (ref 8.6–10.5)
CHLORIDE SERPL-SCNC: 99 MMOL/L (ref 98–107)
CLARITY UR: CLEAR
CO2 SERPL-SCNC: 24 MMOL/L (ref 22–29)
COLOR UR: YELLOW
CREAT SERPL-MCNC: 0.82 MG/DL (ref 0.57–1)
D-LACTATE SERPL-SCNC: 1.9 MMOL/L (ref 0.5–2)
D-LACTATE SERPL-SCNC: 2.2 MMOL/L (ref 0.5–2)
D-LACTATE SERPL-SCNC: 2.4 MMOL/L (ref 0.5–2)
D-LACTATE SERPL-SCNC: 2.5 MMOL/L (ref 0.5–2)
DEPRECATED RDW RBC AUTO: 43.1 FL (ref 37–54)
EGFRCR SERPLBLD CKD-EPI 2021: 76.1 ML/MIN/1.73
EOSINOPHIL # BLD AUTO: 0.41 10*3/MM3 (ref 0–0.4)
EOSINOPHIL NFR BLD AUTO: 4.9 % (ref 0.3–6.2)
ERYTHROCYTE [DISTWIDTH] IN BLOOD BY AUTOMATED COUNT: 14.1 % (ref 12.3–15.4)
GLOBULIN UR ELPH-MCNC: 3.1 GM/DL
GLUCOSE BLDC GLUCOMTR-MCNC: 116 MG/DL (ref 70–130)
GLUCOSE BLDC GLUCOMTR-MCNC: 142 MG/DL (ref 70–130)
GLUCOSE BLDC GLUCOMTR-MCNC: 97 MG/DL (ref 70–130)
GLUCOSE SERPL-MCNC: 152 MG/DL (ref 65–99)
GLUCOSE UR STRIP-MCNC: NEGATIVE MG/DL
HCT VFR BLD AUTO: 42.8 % (ref 34–46.6)
HGB BLD-MCNC: 13.9 G/DL (ref 12–15.9)
HGB UR QL STRIP.AUTO: ABNORMAL
HOLD SPECIMEN: NORMAL
HYALINE CASTS UR QL AUTO: ABNORMAL /LPF
IMM GRANULOCYTES # BLD AUTO: 0.03 10*3/MM3 (ref 0–0.05)
IMM GRANULOCYTES NFR BLD AUTO: 0.4 % (ref 0–0.5)
INR PPP: 1.06 (ref 0.8–1.2)
KETONES UR QL STRIP: NEGATIVE
LEUKOCYTE ESTERASE UR QL STRIP.AUTO: ABNORMAL
LYMPHOCYTES # BLD AUTO: 2.44 10*3/MM3 (ref 0.7–3.1)
LYMPHOCYTES NFR BLD AUTO: 28.9 % (ref 19.6–45.3)
MCH RBC QN AUTO: 27.3 PG (ref 26.6–33)
MCHC RBC AUTO-ENTMCNC: 32.5 G/DL (ref 31.5–35.7)
MCV RBC AUTO: 84.1 FL (ref 79–97)
MONOCYTES # BLD AUTO: 0.58 10*3/MM3 (ref 0.1–0.9)
MONOCYTES NFR BLD AUTO: 6.9 % (ref 5–12)
NEUTROPHILS NFR BLD AUTO: 4.91 10*3/MM3 (ref 1.7–7)
NEUTROPHILS NFR BLD AUTO: 58.1 % (ref 42.7–76)
NITRITE UR QL STRIP: POSITIVE
NRBC BLD AUTO-RTO: 0 /100 WBC (ref 0–0.2)
PH UR STRIP.AUTO: 7 [PH] (ref 5–9)
PLATELET # BLD AUTO: 185 10*3/MM3 (ref 140–450)
PMV BLD AUTO: 9.9 FL (ref 6–12)
POTASSIUM SERPL-SCNC: 4 MMOL/L (ref 3.5–5.2)
PROT SERPL-MCNC: 7.2 G/DL (ref 6–8.5)
PROT UR QL STRIP: ABNORMAL
PROTHROMBIN TIME: 13.7 SECONDS (ref 11.1–15.3)
QT INTERVAL: 390 MS
QTC INTERVAL: 472 MS
RBC # BLD AUTO: 5.09 10*6/MM3 (ref 3.77–5.28)
RBC # UR STRIP: ABNORMAL /HPF
REF LAB TEST METHOD: ABNORMAL
SODIUM SERPL-SCNC: 135 MMOL/L (ref 136–145)
SP GR UR STRIP: 1.08 (ref 1–1.03)
SQUAMOUS #/AREA URNS HPF: ABNORMAL /HPF
TROPONIN T SERPL-MCNC: <0.01 NG/ML (ref 0–0.03)
TSH SERPL DL<=0.05 MIU/L-ACNC: 0.53 UIU/ML (ref 0.27–4.2)
UROBILINOGEN UR QL STRIP: ABNORMAL
WBC # UR STRIP: ABNORMAL /HPF
WBC NRBC COR # BLD: 8.44 10*3/MM3 (ref 3.4–10.8)
WHOLE BLOOD HOLD COAG: NORMAL
WHOLE BLOOD HOLD SPECIMEN: NORMAL
WHOLE BLOOD HOLD SPECIMEN: NORMAL

## 2023-01-08 PROCEDURE — 70498 CT ANGIOGRAPHY NECK: CPT

## 2023-01-08 PROCEDURE — 71045 X-RAY EXAM CHEST 1 VIEW: CPT

## 2023-01-08 PROCEDURE — 87088 URINE BACTERIA CULTURE: CPT | Performed by: STUDENT IN AN ORGANIZED HEALTH CARE EDUCATION/TRAINING PROGRAM

## 2023-01-08 PROCEDURE — G0378 HOSPITAL OBSERVATION PER HR: HCPCS

## 2023-01-08 PROCEDURE — 87186 SC STD MICRODIL/AGAR DIL: CPT | Performed by: STUDENT IN AN ORGANIZED HEALTH CARE EDUCATION/TRAINING PROGRAM

## 2023-01-08 PROCEDURE — 85610 PROTHROMBIN TIME: CPT | Performed by: STUDENT IN AN ORGANIZED HEALTH CARE EDUCATION/TRAINING PROGRAM

## 2023-01-08 PROCEDURE — 0042T HC CT CEREBRAL PERFUSION W/WO CONTRAST: CPT

## 2023-01-08 PROCEDURE — 85025 COMPLETE CBC W/AUTO DIFF WBC: CPT | Performed by: STUDENT IN AN ORGANIZED HEALTH CARE EDUCATION/TRAINING PROGRAM

## 2023-01-08 PROCEDURE — 99285 EMERGENCY DEPT VISIT HI MDM: CPT

## 2023-01-08 PROCEDURE — 93005 ELECTROCARDIOGRAM TRACING: CPT | Performed by: STUDENT IN AN ORGANIZED HEALTH CARE EDUCATION/TRAINING PROGRAM

## 2023-01-08 PROCEDURE — 82962 GLUCOSE BLOOD TEST: CPT

## 2023-01-08 PROCEDURE — 87086 URINE CULTURE/COLONY COUNT: CPT | Performed by: STUDENT IN AN ORGANIZED HEALTH CARE EDUCATION/TRAINING PROGRAM

## 2023-01-08 PROCEDURE — 83605 ASSAY OF LACTIC ACID: CPT | Performed by: STUDENT IN AN ORGANIZED HEALTH CARE EDUCATION/TRAINING PROGRAM

## 2023-01-08 PROCEDURE — 99284 EMERGENCY DEPT VISIT MOD MDM: CPT

## 2023-01-08 PROCEDURE — 0 IOPAMIDOL PER 1 ML: Performed by: STUDENT IN AN ORGANIZED HEALTH CARE EDUCATION/TRAINING PROGRAM

## 2023-01-08 PROCEDURE — 84484 ASSAY OF TROPONIN QUANT: CPT | Performed by: STUDENT IN AN ORGANIZED HEALTH CARE EDUCATION/TRAINING PROGRAM

## 2023-01-08 PROCEDURE — 36415 COLL VENOUS BLD VENIPUNCTURE: CPT

## 2023-01-08 PROCEDURE — 70496 CT ANGIOGRAPHY HEAD: CPT

## 2023-01-08 PROCEDURE — 85730 THROMBOPLASTIN TIME PARTIAL: CPT | Performed by: STUDENT IN AN ORGANIZED HEALTH CARE EDUCATION/TRAINING PROGRAM

## 2023-01-08 PROCEDURE — 87040 BLOOD CULTURE FOR BACTERIA: CPT | Performed by: STUDENT IN AN ORGANIZED HEALTH CARE EDUCATION/TRAINING PROGRAM

## 2023-01-08 PROCEDURE — 80053 COMPREHEN METABOLIC PANEL: CPT | Performed by: STUDENT IN AN ORGANIZED HEALTH CARE EDUCATION/TRAINING PROGRAM

## 2023-01-08 PROCEDURE — 36415 COLL VENOUS BLD VENIPUNCTURE: CPT | Performed by: STUDENT IN AN ORGANIZED HEALTH CARE EDUCATION/TRAINING PROGRAM

## 2023-01-08 PROCEDURE — 70450 CT HEAD/BRAIN W/O DYE: CPT

## 2023-01-08 PROCEDURE — 25010000002 CEFTRIAXONE PER 250 MG: Performed by: STUDENT IN AN ORGANIZED HEALTH CARE EDUCATION/TRAINING PROGRAM

## 2023-01-08 PROCEDURE — 93010 ELECTROCARDIOGRAM REPORT: CPT | Performed by: INTERNAL MEDICINE

## 2023-01-08 PROCEDURE — 84443 ASSAY THYROID STIM HORMONE: CPT | Performed by: INTERNAL MEDICINE

## 2023-01-08 PROCEDURE — 81001 URINALYSIS AUTO W/SCOPE: CPT | Performed by: STUDENT IN AN ORGANIZED HEALTH CARE EDUCATION/TRAINING PROGRAM

## 2023-01-08 RX ORDER — CLOPIDOGREL BISULFATE 75 MG/1
75 TABLET ORAL DAILY
Status: DISCONTINUED | OUTPATIENT
Start: 2023-01-08 | End: 2023-01-11 | Stop reason: HOSPADM

## 2023-01-08 RX ORDER — SODIUM CHLORIDE 0.9 % (FLUSH) 0.9 %
10 SYRINGE (ML) INJECTION AS NEEDED
Status: DISCONTINUED | OUTPATIENT
Start: 2023-01-08 | End: 2023-01-11 | Stop reason: HOSPADM

## 2023-01-08 RX ORDER — ACETAMINOPHEN 325 MG/1
650 TABLET ORAL EVERY 4 HOURS PRN
Status: DISCONTINUED | OUTPATIENT
Start: 2023-01-08 | End: 2023-01-11 | Stop reason: HOSPADM

## 2023-01-08 RX ORDER — SODIUM CHLORIDE 0.9 % (FLUSH) 0.9 %
10 SYRINGE (ML) INJECTION EVERY 12 HOURS SCHEDULED
Status: DISCONTINUED | OUTPATIENT
Start: 2023-01-08 | End: 2023-01-11 | Stop reason: HOSPADM

## 2023-01-08 RX ORDER — ATORVASTATIN CALCIUM 40 MG/1
80 TABLET, FILM COATED ORAL NIGHTLY
Status: DISCONTINUED | OUTPATIENT
Start: 2023-01-08 | End: 2023-01-11 | Stop reason: HOSPADM

## 2023-01-08 RX ORDER — LANOLIN ALCOHOL/MO/W.PET/CERES
5 CREAM (GRAM) TOPICAL NIGHTLY PRN
Status: DISCONTINUED | OUTPATIENT
Start: 2023-01-08 | End: 2023-01-11 | Stop reason: HOSPADM

## 2023-01-08 RX ORDER — ASPIRIN 81 MG/1
81 TABLET ORAL DAILY
Status: DISCONTINUED | OUTPATIENT
Start: 2023-01-08 | End: 2023-01-11 | Stop reason: HOSPADM

## 2023-01-08 RX ORDER — ACETAMINOPHEN 160 MG/5ML
650 SOLUTION ORAL EVERY 4 HOURS PRN
Status: DISCONTINUED | OUTPATIENT
Start: 2023-01-08 | End: 2023-01-11 | Stop reason: HOSPADM

## 2023-01-08 RX ORDER — ESCITALOPRAM OXALATE 10 MG/1
20 TABLET ORAL NIGHTLY
Status: DISCONTINUED | OUTPATIENT
Start: 2023-01-08 | End: 2023-01-11 | Stop reason: HOSPADM

## 2023-01-08 RX ORDER — FAMOTIDINE 40 MG/1
40 TABLET, FILM COATED ORAL DAILY
Status: DISCONTINUED | OUTPATIENT
Start: 2023-01-08 | End: 2023-01-08

## 2023-01-08 RX ORDER — CALCIUM CARBONATE 200(500)MG
2 TABLET,CHEWABLE ORAL 2 TIMES DAILY PRN
Status: DISCONTINUED | OUTPATIENT
Start: 2023-01-08 | End: 2023-01-11 | Stop reason: HOSPADM

## 2023-01-08 RX ORDER — PANTOPRAZOLE SODIUM 40 MG/1
40 TABLET, DELAYED RELEASE ORAL EVERY MORNING
Status: DISCONTINUED | OUTPATIENT
Start: 2023-01-09 | End: 2023-01-11 | Stop reason: HOSPADM

## 2023-01-08 RX ORDER — GABAPENTIN 300 MG/1
300 CAPSULE ORAL EVERY 8 HOURS SCHEDULED
Status: DISCONTINUED | OUTPATIENT
Start: 2023-01-08 | End: 2023-01-11 | Stop reason: HOSPADM

## 2023-01-08 RX ORDER — BUPROPION HYDROCHLORIDE 150 MG/1
150 TABLET, EXTENDED RELEASE ORAL DAILY
Status: DISCONTINUED | OUTPATIENT
Start: 2023-01-09 | End: 2023-01-11 | Stop reason: HOSPADM

## 2023-01-08 RX ORDER — SODIUM CHLORIDE 9 MG/ML
40 INJECTION, SOLUTION INTRAVENOUS AS NEEDED
Status: DISCONTINUED | OUTPATIENT
Start: 2023-01-08 | End: 2023-01-11 | Stop reason: HOSPADM

## 2023-01-08 RX ORDER — ONDANSETRON 4 MG/1
4 TABLET, FILM COATED ORAL EVERY 6 HOURS PRN
Status: DISCONTINUED | OUTPATIENT
Start: 2023-01-08 | End: 2023-01-11 | Stop reason: HOSPADM

## 2023-01-08 RX ORDER — HYDROCODONE BITARTRATE AND ACETAMINOPHEN 5; 325 MG/1; MG/1
1 TABLET ORAL EVERY 6 HOURS PRN
Status: DISCONTINUED | OUTPATIENT
Start: 2023-01-08 | End: 2023-01-11 | Stop reason: HOSPADM

## 2023-01-08 RX ORDER — ONDANSETRON 2 MG/ML
4 INJECTION INTRAMUSCULAR; INTRAVENOUS EVERY 6 HOURS PRN
Status: DISCONTINUED | OUTPATIENT
Start: 2023-01-08 | End: 2023-01-11 | Stop reason: HOSPADM

## 2023-01-08 RX ORDER — DIAZEPAM 5 MG/1
5 TABLET ORAL NIGHTLY PRN
Status: DISCONTINUED | OUTPATIENT
Start: 2023-01-08 | End: 2023-01-11 | Stop reason: HOSPADM

## 2023-01-08 RX ORDER — HYDRALAZINE HYDROCHLORIDE 20 MG/ML
20 INJECTION INTRAMUSCULAR; INTRAVENOUS ONCE
Status: DISCONTINUED | OUTPATIENT
Start: 2023-01-08 | End: 2023-01-08

## 2023-01-08 RX ORDER — ACETAMINOPHEN 650 MG/1
650 SUPPOSITORY RECTAL EVERY 4 HOURS PRN
Status: DISCONTINUED | OUTPATIENT
Start: 2023-01-08 | End: 2023-01-11 | Stop reason: HOSPADM

## 2023-01-08 RX ADMIN — IOPAMIDOL 50 ML: 755 INJECTION, SOLUTION INTRAVENOUS at 10:47

## 2023-01-08 RX ADMIN — GABAPENTIN 300 MG: 300 CAPSULE ORAL at 16:26

## 2023-01-08 RX ADMIN — MELATONIN 5.25 MG: 3 TAB ORAL at 20:16

## 2023-01-08 RX ADMIN — HYDROCODONE BITARTRATE AND ACETAMINOPHEN 1 TABLET: 5; 325 TABLET ORAL at 16:26

## 2023-01-08 RX ADMIN — Medication 10 ML: at 16:26

## 2023-01-08 RX ADMIN — Medication 10 ML: at 21:37

## 2023-01-08 RX ADMIN — ESCITALOPRAM OXALATE 20 MG: 10 TABLET ORAL at 20:16

## 2023-01-08 RX ADMIN — IOPAMIDOL 90 ML: 755 INJECTION, SOLUTION INTRAVENOUS at 10:28

## 2023-01-08 RX ADMIN — ASPIRIN 81 MG: 81 TABLET, FILM COATED ORAL at 16:26

## 2023-01-08 RX ADMIN — CEFTRIAXONE SODIUM 1 G: 1 INJECTION, POWDER, FOR SOLUTION INTRAMUSCULAR; INTRAVENOUS at 13:43

## 2023-01-08 RX ADMIN — GABAPENTIN 300 MG: 300 CAPSULE ORAL at 21:28

## 2023-01-08 RX ADMIN — CLOPIDOGREL BISULFATE 75 MG: 75 TABLET ORAL at 16:26

## 2023-01-08 RX ADMIN — ATORVASTATIN CALCIUM 80 MG: 40 TABLET, FILM COATED ORAL at 20:16

## 2023-01-08 RX ADMIN — Medication 10 ML: at 16:27

## 2023-01-08 RX ADMIN — HYDROCODONE BITARTRATE AND ACETAMINOPHEN 1 TABLET: 5; 325 TABLET ORAL at 23:10

## 2023-01-08 NOTE — Clinical Note
[facility] EMERGENCY DEPARTMENT  [address] [place] [id number]  Phone: [phone]    kevon ramirez accompanied Ave Ramirez to the emergency department on 1/8/2023. They may return to work on 01/09/2023.        Thank you for choosing [facility].    Letty Saavedra, RN

## 2023-01-08 NOTE — ED NOTES
Patient is at an increased risk for falls. Fall light activated, yellow falls bracelet and yellow non-skid socks placed on patient. Call light within reach and patient instructed to call for assistance. Side rails up x2, bed alarm activated, and gait belt readily accessible.

## 2023-01-08 NOTE — ED PROVIDER NOTES
Subjective   History of Present Illness  73-year-old female comes to the ER with chief complaint of difficulty getting words out.  She has a history of a prior stroke.  She has residual difficulty with her speech, but this morning it was much worse to the point that family could not understand her.  Patient is jumbling her words that are incoherent.  No other symptoms other than burning when she pees and lower belly pain.  She is on aspirin and Plavix.  Last known normal was last night.    History provided by:  Spouse, relative and patient  History limited by:  Acuity of condition   used: No        Review of Systems   Constitutional: Negative for chills and fever.   HENT: Negative for drooling.    Eyes: Negative for redness.   Respiratory: Negative for shortness of breath.    Cardiovascular: Negative for chest pain.   Gastrointestinal: Positive for abdominal pain. Negative for nausea and vomiting.   Genitourinary: Positive for dysuria. Negative for flank pain, frequency, hematuria and urgency.   Skin: Negative for color change.   Neurological: Positive for speech difficulty. Negative for dizziness, seizures, facial asymmetry, weakness, numbness and headaches.   Psychiatric/Behavioral: Negative for confusion.       Past Medical History:   Diagnosis Date   • Acute angina (HCC)    • Acute bronchitis    • Acute sinusitis    • Anxiety    • Atherosclerotic heart disease of native coronary artery without angina pectoris    • Borderline glaucoma    • CAD (coronary artery disease)    • Cough    • Depression    • Disease of gallbladder     s/p lap jocelyne and normal ioc      • Encounter for gynecological examination (general) (routine) without abnormal findings    • Encounter for screening mammogram for malignant neoplasm of breast    • History of echocardiogram 12/12/2014    Normal LV systolic funciton with Ef of 55% with diastolic relaxation abnormality of the left ventricle. Mild mitral regurgitation.   •  "Hyperlipidemia    • Hypertension    • Hypothyroidism    • Keratoconjunctivitis sicca (HCC)    • Myopia     refractive change OD    • Nausea and vomiting    • Nuclear cataract    • PVD (peripheral vascular disease) (HCC)    • SOB (shortness of breath)    • Upper respiratory infection        Allergies   Allergen Reactions   • Penicillins    • Lisinopril Rash       Past Surgical History:   Procedure Laterality Date   • CARDIAC CATHETERIZATION  2014    Medically manageable coronary artery disease in the previously placed stent in the diagonal coronary artery was patent with liminal irregularities. Normal LV systolic function with no wall motion abnormalities   • CARDIAC CATHETERIZATION     •  SECTION     • CHOLECYSTECTOMY  2012    intraoperative cholangiogram. Cholecystitis & cholelithiasis   • CORONARY STENT PLACEMENT      [facility]   • MAMMO BILATERAL  2016    DIAG MAMM, BILAT DIGITAL  (Medicare) (Other abnormal and inconclusive findings on diagnostic imaging of breast)    • MAMMO BILATERAL  2016    SCREENING MAMMOGRAPHY DIGITAL  (Medicare) (Encounter for screening mammogram for malignant neoplasm of breast)    • OTHER SURGICAL HISTORY  2003    OCT DISC [facility] 82553 (Borderline glaucoma)    • TONSILLECTOMY         History reviewed. No pertinent family history.    Social History     Socioeconomic History   • Marital status:    Tobacco Use   • Smoking status: Every Day   • Smokeless tobacco: Never   Substance and Sexual Activity   • Alcohol use: No   • Drug use: No   • Sexual activity: Defer           Objective    Vitals:    23 0946 23 1108 23 1111   BP: 176/85  153/67   BP Location: Right arm  Left arm   Patient Position: Sitting  Sitting   Pulse: 93  90   Resp: 20  18   Temp: 97.3 °F (36.3 °C)     TempSrc: Oral     SpO2: 96%  94%   Weight:  77.1 kg (170 lb)    Height: 148.6 cm (58.5\")         Physical Exam  Vitals and nursing note reviewed. "   Constitutional:       General: She is not in acute distress.     Appearance: She is well-developed. She is obese. She is ill-appearing. She is not toxic-appearing or diaphoretic.   Pulmonary:      Effort: Pulmonary effort is normal. No accessory muscle usage or respiratory distress.   Chest:      Chest wall: No tenderness.   Abdominal:      Palpations: Abdomen is soft.      Tenderness: There is abdominal tenderness (deep palpation). There is no guarding or rebound.   Musculoskeletal:         General: Normal range of motion.   Skin:     General: Skin is warm and dry.      Capillary Refill: Capillary refill takes less than 2 seconds.   Neurological:      Mental Status: She is alert.      Cranial Nerves: Dysarthria present. No facial asymmetry.      Sensory: No sensory deficit.      Motor: No abnormal muscle tone.      Coordination: Coordination is intact.         ECG 12 Lead      Date/Time: 1/8/2023 11:54 AM  Performed by: Sameer Boyd MD  Authorized by: Sameer Boyd MD   Interpreted by physician  Rhythm: sinus rhythm  Rate: normal  QRS axis: normal  Conduction: left bundle branch block  ST segment elevation noted on lead: none.  Clinical impression: abnormal ECG                 ED Course      Results for orders placed or performed during the hospital encounter of 01/08/23   Comprehensive Metabolic Panel    Specimen: Blood   Result Value Ref Range    Glucose 152 (H) 65 - 99 mg/dL    BUN 8 8 - 23 mg/dL    Creatinine 0.82 0.57 - 1.00 mg/dL    Sodium 135 (L) 136 - 145 mmol/L    Potassium 4.0 3.5 - 5.2 mmol/L    Chloride 99 98 - 107 mmol/L    CO2 24.0 22.0 - 29.0 mmol/L    Calcium 9.4 8.6 - 10.5 mg/dL    Total Protein 7.2 6.0 - 8.5 g/dL    Albumin 4.1 3.5 - 5.2 g/dL    ALT (SGPT) 26 1 - 33 U/L    AST (SGOT) 25 1 - 32 U/L    Alkaline Phosphatase 143 (H) 39 - 117 U/L    Total Bilirubin 0.6 0.0 - 1.2 mg/dL    Globulin 3.1 gm/dL    A/G Ratio 1.3 g/dL    BUN/Creatinine Ratio 9.8 7.0 - 25.0    Anion Gap 12.0 5.0 -  15.0 mmol/L    eGFR 76.1 >60.0 mL/min/1.73   Protime-INR    Specimen: Blood   Result Value Ref Range    Protime 13.7 11.1 - 15.3 Seconds    INR 1.06 0.80 - 1.20   aPTT    Specimen: Blood   Result Value Ref Range    PTT 33.4 20.0 - 40.3 seconds   Troponin    Specimen: Blood   Result Value Ref Range    Troponin T <0.010 0.000 - 0.030 ng/mL   CBC Auto Differential    Specimen: Blood   Result Value Ref Range    WBC 8.44 3.40 - 10.80 10*3/mm3    RBC 5.09 3.77 - 5.28 10*6/mm3    Hemoglobin 13.9 12.0 - 15.9 g/dL    Hematocrit 42.8 34.0 - 46.6 %    MCV 84.1 79.0 - 97.0 fL    MCH 27.3 26.6 - 33.0 pg    MCHC 32.5 31.5 - 35.7 g/dL    RDW 14.1 12.3 - 15.4 %    RDW-SD 43.1 37.0 - 54.0 fl    MPV 9.9 6.0 - 12.0 fL    Platelets 185 140 - 450 10*3/mm3    Neutrophil % 58.1 42.7 - 76.0 %    Lymphocyte % 28.9 19.6 - 45.3 %    Monocyte % 6.9 5.0 - 12.0 %    Eosinophil % 4.9 0.3 - 6.2 %    Basophil % 0.8 0.0 - 1.5 %    Immature Grans % 0.4 0.0 - 0.5 %    Neutrophils, Absolute 4.91 1.70 - 7.00 10*3/mm3    Lymphocytes, Absolute 2.44 0.70 - 3.10 10*3/mm3    Monocytes, Absolute 0.58 0.10 - 0.90 10*3/mm3    Eosinophils, Absolute 0.41 (H) 0.00 - 0.40 10*3/mm3    Basophils, Absolute 0.07 0.00 - 0.20 10*3/mm3    Immature Grans, Absolute 0.03 0.00 - 0.05 10*3/mm3    nRBC 0.0 0.0 - 0.2 /100 WBC   Urinalysis With Culture If Indicated - Urine, Clean Catch    Specimen: Urine, Clean Catch   Result Value Ref Range    Color, UA Yellow Yellow, Straw, Dark Yellow, Cat    Appearance, UA Clear Clear    pH, UA 7.0 5.0 - 9.0    Specific Gravity, UA 1.084 (H) 1.003 - 1.030    Glucose, UA Negative Negative    Ketones, UA Negative Negative    Bilirubin, UA Negative Negative    Blood, UA Moderate (2+) (A) Negative    Protein, UA 30 mg/dL (1+) (A) Negative    Leuk Esterase, UA Small (1+) (A) Negative    Nitrite, UA Positive (A) Negative    Urobilinogen, UA 0.2 E.U./dL 0.2 - 1.0 E.U./dL   Urinalysis, Microscopic Only - Urine, Clean Catch    Specimen: Urine, Clean  Catch   Result Value Ref Range    RBC, UA 31-50 (A) None Seen /HPF    WBC, UA 6-12 (A) None Seen, 0-2, 3-5 /HPF    Bacteria, UA 1+ (A) None Seen /HPF    Squamous Epithelial Cells, UA None Seen None Seen, 0-2 /HPF    Hyaline Casts, UA None Seen None Seen /LPF    Methodology Manual Light Microscopy    POC Glucose Once    Specimen: Blood   Result Value Ref Range    Glucose 142 (H) 70 - 130 mg/dL   ECG 12 Lead Stroke Evaluation   Result Value Ref Range    QT Interval 432 ms    QTC Interval 522 ms   Green Top (Gel)   Result Value Ref Range    Extra Tube Hold for add-ons.    Lavender Top   Result Value Ref Range    Extra Tube hold for add-on    Light Blue Top   Result Value Ref Range    Extra Tube Hold for add-ons.      XR Chest 1 View   Final Result   No acute pulmonary disease.      Electronically signed by:  Ariel Degroot MD  1/8/2023 12:08 PM CST   Workstation: [id number]      CT CEREBRAL PERFUSION WITH & WITHOUT CONTRAST   Final Result   100% stenosis.  IMPRESSION: No large vessel occlusion or   significant stenosis in the arteries of the head and neck.      CT PERFUSION FINDINGS:   HCC focus of cerebral blood flow less than 30% located in the   left posterior frontal or parietal region. No areas of Tmax   greater than six seconds visualized. Findings are likely due to   the old infarct in this area.   The time to peak, mean transit time, cerebral blood flow, and   cerebral blood volume maps are otherwise symmetric and normal.   There is no ischemic core or penumbra.      CBF < 30% VOLUME: 8 mL.   TMAX > 6.0sec VOLUME: 0 mL.   MISMATCH VOLUME: -8 mL.   MISMATCH RATIO: 0 mL.      Additional findings: Degenerative changes of the cervical spine.   Fusion of C2 and C3 vertebral bodies.      IMPRESSION:   No acute CTA head and neck abnormality.   Abnormal perfusion findings of less than 30% cerebral blood flow   with a volume of 8 mL in the left MCA territory.   Less than 45% flow in this area seen on the blood vessel  density   images with rapid AI.   Proximal aspect of the left vertebral artery nonopacified. An   otherwise patent, nondominant left vertebral artery arises from   collaterals in the lower cervical region. These findings are   chronic and are likely congenital.      Electronically signed by:  Ariel Degroot MD  1/8/2023 11:48 AM [facility]   Workstation: [id number]      CT Angiogram Head w AI Analysis of LVO   Final Result   100% stenosis.  IMPRESSION: No large vessel occlusion or   significant stenosis in the arteries of the head and neck.      CT PERFUSION FINDINGS:   HCC focus of cerebral blood flow less than 30% located in the   left posterior frontal or parietal region. No areas of Tmax   greater than six seconds visualized. Findings are likely due to   the old infarct in this area.   The time to peak, mean transit time, cerebral blood flow, and   cerebral blood volume maps are otherwise symmetric and normal.   There is no ischemic core or penumbra.      CBF < 30% VOLUME: 8 mL.   TMAX > 6.0sec VOLUME: 0 mL.   MISMATCH VOLUME: -8 mL.   MISMATCH RATIO: 0 mL.      Additional findings: Degenerative changes of the cervical spine.   Fusion of C2 and C3 vertebral bodies.      IMPRESSION:   No acute CTA head and neck abnormality.   Abnormal perfusion findings of less than 30% cerebral blood flow   with a volume of 8 mL in the left MCA territory.   Less than 45% flow in this area seen on the blood vessel density   images with rapid AI.   Proximal aspect of the left vertebral artery nonopacified. An   otherwise patent, nondominant left vertebral artery arises from   collaterals in the lower cervical region. These findings are   chronic and are likely congenital.      Electronically signed by:  Ariel Degroot MD  1/8/2023 11:48 AM CST   Workstation: [id number]      CT Angiogram Neck   Final Result   100% stenosis.  IMPRESSION: No large vessel occlusion or   significant stenosis in the arteries of the head and neck.      CT PERFUSION  FINDINGS:   HCC focus of cerebral blood flow less than 30% located in the   left posterior frontal or parietal region. No areas of Tmax   greater than six seconds visualized. Findings are likely due to   the old infarct in this area.   The time to peak, mean transit time, cerebral blood flow, and   cerebral blood volume maps are otherwise symmetric and normal.   There is no ischemic core or penumbra.      CBF < 30% VOLUME: 8 mL.   TMAX > 6.0sec VOLUME: 0 mL.   MISMATCH VOLUME: -8 mL.   MISMATCH RATIO: 0 mL.      Additional findings: Degenerative changes of the cervical spine.   Fusion of C2 and C3 vertebral bodies.      IMPRESSION:   No acute CTA head and neck abnormality.   Abnormal perfusion findings of less than 30% cerebral blood flow   with a volume of 8 mL in the left MCA territory.   Less than 45% flow in this area seen on the blood vessel density   images with rapid AI.   Proximal aspect of the left vertebral artery nonopacified. An   otherwise patent, nondominant left vertebral artery arises from   collaterals in the lower cervical region. These findings are   chronic and are likely congenital.      Electronically signed by:  Ariel Degroot MD  1/8/2023 11:48 AM CST   Workstation: [id number]      CT Head Without Contrast Stroke Protocol   Final Result   No evidence of intracranial hemorrhage, mass effect or large   acute infarct.   Old infarct noted in the left turbo hemisphere similar to CTA   neck dated 2/20/2022.      Electronically signed by:  Ariel Degroot MD  1/8/2023 10:49 AM [facility]   Workstation: [id number]                Medical Decision Making  Vital signs are stable, afebrile.  Neurology consulted and evaluated the patient's.  Patient has some abnormal findings on her CT perfusion concerning.  Neurology recommended not dropping her blood pressure.  Recommend admission for further evaluation and treatment.  Patient also found to have a UTI with leukocyte and nitrate positive UA.  Antibiotics ordered.  Spoke  with the on-call hospitalist who agrees to admit.    Dysarthria: acute illness or injury  Urinary tract infection with hematuria, site unspecified: acute illness or injury  Amount and/or Complexity of Data Reviewed  Labs: ordered.  Radiology: ordered.  ECG/medicine tests: ordered.      Risk  Prescription drug management.  Decision regarding hospitalization.      Critical Care  Total time providing critical care: 30-74 minutes (33 min.)      Final diagnoses:   Dysarthria   Urinary tract infection with hematuria, site unspecified       ED Disposition  ED Disposition     ED Disposition   Decision to Admit    Condition   --    Comment   Level of Care: Stepdown [25]   Diagnosis: Dysarthria [784.51.ICD-9-CM]   Admitting Physician: MAHNAZ VERAS [[id number]]   Attending Physician: MAHNAZ VERAS [[id number]]               No follow-up provider specified.       Medication List      No changes were made to your prescriptions during this visit.          Sameer Boyd MD  01/08/23 [id number]

## 2023-01-08 NOTE — CONSULTS
72 yo hx of stroke presenting with speech changes  LKN before bed  No motor or sensory symptoms        Seen on video at 12pm  Isolated expressive aphasia  Comprehension intact     No arm/leg weakness       Small area of decreased blood flow in the left frontal parietal territory that would correlate with symptoms.           [facility]   Teleneurology Note    Patient Name: Ave Ramirez  : 1950  MRN: [id number]  Primary Care Physician: Anali Sheppard DO  Referring Site: [place]    Subjective   Teleneurology Initial Data     Arrival Date Telestroke Site: 23           Date Last Known Well: 23  Last Known Well: Time Unknown         Stroke Risk Factors/ Pertinent Data     Stroke risk factors: prior stroke/ TIA  Anticoagulants prior to arrival: none  Antiplatelets prior to arrival: aspirin         NIH Stroke Scale     NIHSS Performed Date: 23           Review of Systems     Review of Systems  ***  Objective   Exam     Exam performed with the help of support staff from the referring site  Neurological Exam  ***  Result Review    Results          Personal review of CNS imaging:(Official report by radiologist pending)       Thrombolytic   Thrombolytics: tPA not given  Tissue Plasminogen Activator (tPA) Exclusion Criteria: Onset unknown or GREATER than 4.5 hours     Assessment & Plan   Assessment/ Plan     Assessment:          Plan:           Disposition          Medical Decision Making  Medical Data Reviewed: Data reviewed including: clinical labs, radiology and/or medical tests    IKim MD, saw the patient on   at   for an initial in-patient or emergency room telememedicine face to face consult using interactive technology for  . The location of the patient was [place]. I was located at  . I was assisted with the exam by  .                        Kim Shipley MD

## 2023-01-08 NOTE — PLAN OF CARE
Goal Outcome Evaluation:   Pt alert. Unable to understand responses to questions due to garbled speech.  states the pt is garbled at baseline. Pt answers yes and no questions. Numbness and tingling present on right side. Bedside NIH preformed. Pt given prn norco for abd pain. Pt passed RN Dysphagia screen. RA. VSS. Afebrile.

## 2023-01-08 NOTE — H&P
[facility] Medicine Admission      Date of Admission: 1/8/2023      Primary Care Physician: Anali Sheppard DO      Chief Complaint: Abdominal pain    HPI:    Ms. Ave Ramirez is a 73-year-old female with remote history of stroke, CAD, PAD, hypertension, who presented to the hospital with complaints of lower abdominal pain.  Patient reported that her symptoms started this morning.  She denies having any associated fever, chills, rigors, nausea, vomiting, sweating, palpitations, urgency, frequency, hematuria, pyuria, dysuria.  She describes her pain as lower abdominal, radiating to her upper abdomen.  It has resolved since then.  She also reported worsening dysarthria on arrival to the ER but her symptoms have since then resolved.  She denies any new numbness/tingling/weakness in any part of her body along with any bowel/bladder incontinence, visual/auditory changes.    On arrival to the ER, telemetry neurology was consulted by ER physician.  CTA head and neck along with CT head with perfusion scan was performed which showed abnormal perfusion with chronic changes.  Patient is admitted to the hospital service for further evaluation management.      Concurrent Medical History:  has a past medical history of Acute angina (HCC), Acute bronchitis, Acute sinusitis, Anxiety, Atherosclerotic heart disease of native coronary artery without angina pectoris, Borderline glaucoma, CAD (coronary artery disease), Cough, Depression, Disease of gallbladder, Encounter for gynecological examination (general) (routine) without abnormal findings, Encounter for screening mammogram for malignant neoplasm of breast, History of echocardiogram (12/12/2014), Hyperlipidemia, Hypertension, Hypothyroidism, Keratoconjunctivitis sicca (HCC), Myopia, Nausea and vomiting, Nuclear cataract, PVD (peripheral vascular disease) (HCC), SOB (shortness of breath), and Upper respiratory infection.    Past Surgical  History:  has a past surgical history that includes Cardiac catheterization (2014); Cholecystectomy (2012); Mammo bilateral (2016); Other surgical history (2003); Mammo bilateral (2016); Tonsillectomy;  section; Cardiac catheterization; and Coronary stent placement.    Family History: family history is not on file.       Social History:  reports that she has been smoking. She has never used smokeless tobacco. She reports that she does not drink alcohol and does not use drugs.    Allergies:   Allergies   Allergen Reactions   • Penicillins    • Lisinopril Rash       Medications:   Prior to Admission medications    Medication Sig Start Date End Date Taking? Authorizing Provider   aspirin 81 MG EC tablet Take 1 tablet by mouth Daily. 19  Yes Darrius Molina MD   atorvastatin (LIPITOR) 40 MG tablet Take 40 mg by mouth Daily. 16  Yes Farideh Lindsey MD   buPROPion SR (WELLBUTRIN SR) 150 MG 12 hr tablet Take 150 mg by mouth Daily. 6/3/22  Yes Anali Sheppard DO   Cholecalciferol (VITAMIN D3) 2000 UNITS tablet Take 1 tablet by mouth Daily. 16  Yes Farideh Lindsey MD   clopidogrel (PLAVIX) 75 MG tablet Take 75 mg by mouth Daily. 3/29/22  Yes Anali Sheppard DO   diazepam (VALIUM) 5 MG tablet Take 5 mg by mouth At Night As Needed for Anxiety. 16  Yes Farideh Lindsey MD   docusate sodium (COLACE) 100 MG capsule Take 100 mg by mouth Daily As Needed. 16  Yes Farideh Lindsey MD   Fluticasone Furoate-Vilanterol (Breo Ellipta) 200-25 MCG/INH inhaler INHALE 1 PUFF BY MOUTH DAILY 20 Yes Farideh Lindsey MD   furosemide (LASIX) 20 MG tablet Take 20 mg by mouth Daily. 16  Yes Farideh Lindsey MD   gabapentin (NEURONTIN) 100 MG capsule Take 400 mg by mouth 3 (Three) Times a Day.   Yes Farideh Lindsey MD   HYDROcodone-acetaminophen (NORCO) 5-325 MG per tablet Take 1 tablet by mouth Every 6 (Six) Hours As Needed  for Moderate Pain .   Yes Farideh Lindsey MD   hydrOXYzine (ATARAX) 25 MG tablet Take 25 mg by mouth 3 (Three) Times a Day As Needed for Itching.   Yes Farideh Lindsey MD   ISOSORBIDE MONONITRATE PO Take 30 mg by mouth Daily. 4/12/22  Yes Anali Sheppard, DO   levocetirizine (XYZAL) 5 MG tablet Take 5 mg by mouth Daily. 5/18/22  Yes Farideh Lindsey MD   levothyroxine (SYNTHROID, LEVOTHROID) 75 MCG tablet Take 100 mcg by mouth Daily. 4/11/06  Yes Farideh Lindsey MD   omeprazole (priLOSEC) 40 MG capsule Take 40 mg by mouth Daily.   Yes Farideh Lindsey MD   promethazine (PHENERGAN) 25 MG tablet Take 25 mg by mouth Every 6 (Six) Hours As Needed for Nausea. 8/24/21  Yes Farideh Lindsey MD   tiZANidine (ZANAFLEX) 4 MG tablet Take 4 mg by mouth At Night As Needed for Muscle Spasms. 1 and 1/2 tablet by mouth 3 times a day   Yes Farideh Lindsey MD   Albuterol Sulfate (VENTOLIN HFA IN) Take 2 puffs by mouth Every 4 (Four) Hours As Needed (weezing). 4/27/22   Anali Sheppard, DO   DIPHENHYD-LIDOCAINE-NYSTATIN MT Apply  to the mouth or throat 4 (Four) Times a Day As Needed.    Farideh Lindsey MD   escitalopram (LEXAPRO) 20 MG tablet Take 20 mg by mouth Every Night. 4/11/16   Farideh Lindsey MD   losartan (COZAAR) 50 MG tablet Take 1 tablet by mouth Daily. Take in PM 12/6/19   Darrius Molina MD   metoprolol succinate XL (TOPROL-XL) 50 MG 24 hr tablet Take 50 mg by mouth Every Night. 4/11/16   Farideh Lindsey MD       Review of Systems:  Review of Systems   Otherwise complete ROS is negative except as mentioned above.    Physical Exam:   Temp:  [97.3 °F (36.3 °C)] 97.3 °F (36.3 °C)  Heart Rate:  [86-93] 86  Resp:  [18-20] 19  BP: (136-176)/(65-87) 136/87  Physical Exam  General: [Appears stated age, alert, oriented ×3, cooperative]  HEENT: [Normocephalic, atraumatic, EOMI, PERRLA, unremarkable external ear, moist mucous membranes, supple neck, no  lymphadenopathy]  CVS: [RRR, S1, S2, no murmurs, normal peripheral pulses]  Respiratory: [CTA bilaterally, symmetrical expansion, no wheezing, no rales, no crackles]  Gastrointestinal: [Soft, nontender, nondistended, no organomegaly could be appreciated, normal bowel sounds]  Musculoskeletal: [Grossly normal, no tenderness, normal ROM]  Skin: [No rashes, no erythema, no lesions]  Extremities: [Normal inspection.  No edema, no cyanosis, no clubbing.  Normal capillary refill]  Neuro: [Alert, oriented ×3, dysarthria.  Otherwise neurological examination unremarkable]  Psychiatry: [No anxiety, no depression, nonsuicidal]        Results Reviewed:  I have personally reviewed current lab, radiology, and data and agree with results.  Lab Results (last 24 hours)     Procedure Component Value Units Date/Time    Extra Tubes [[id number]] Collected: 01/08/23 1237    Specimen: Blood, Venous Line Updated: 01/08/23 1451    Narrative:      The following orders were created for panel order Extra Tubes.  Procedure                               Abnormality         Status                     ---------                               -----------         ------                     Gold Top - SST[[id number]]                                   In process                   Please view results for these tests on the individual orders.    Gold Top - SST [[id number]] Collected: 01/08/23 1237    Specimen: Blood Updated: 01/08/23 1451    TSH [[id number]] Collected: 01/08/23 1237    Specimen: Blood Updated: 01/08/23 1449    Extra Tubes [[id number]] Collected: 01/08/23 1237    Specimen: Blood Updated: 01/08/23 1345    Narrative:      The following orders were created for panel order Extra Tubes.  Procedure                               Abnormality         Status                     ---------                               -----------         ------                     Lavender Top[[id number]]                                     Final result                Green Top (Gel)[[id number]]                                  Final result                 Please view results for these tests on the individual orders.    Lavender Top [[id number]] Collected: 01/08/23 1237    Specimen: Blood Updated: 01/08/23 1345     Extra Tube hold for add-on     Comment: Auto resulted       Green Top (Gel) [[id number]] Collected: 01/08/23 1237    Specimen: Blood Updated: 01/08/23 1345     Extra Tube Hold for add-ons.     Comment: Auto resulted.       Lactic Acid, Plasma [[id number]]  (Abnormal) Collected: 01/08/23 1235    Specimen: Blood Updated: 01/08/23 1321     Lactate 2.2 mmol/L     [place] Draw [[id number]] Collected: 01/08/23 1010    Specimen: Blood Updated: 01/08/23 1312    Narrative:      The following orders were created for panel order [place] Draw.  Procedure                               Abnormality         Status                     ---------                               -----------         ------                     Green Top (Gel)[[id number]]                                  Final result               Lavender Top[[id number]]                                     Final result               Gold Top - SST[[id number]]                                                              Light Blue Top[[id number]]                                   Final result                 Please view results for these tests on the individual orders.    Blood Culture - Blood, Arm, Right [[id number]] Collected: 01/08/23 1235    Specimen: Blood from Arm, Right Updated: 01/08/23 1242    Blood Culture - Blood, Arm, Right [[id number]] Collected: 01/08/23 1235    Specimen: Blood from Arm, Right Updated: 01/08/23 1242    Urinalysis, Microscopic Only - Urine, Clean Catch [[id number]]  (Abnormal) Collected: 01/08/23 1135    Specimen: Urine, Clean Catch Updated: 01/08/23 1206     RBC, UA 31-50 /HPF      WBC, UA 6-12 /HPF      Bacteria, UA 1+ /HPF      Squamous Epithelial Cells, UA None Seen /HPF      Hyaline Casts, UA None Seen  /LPF      Methodology Manual Light Microscopy    Urine Culture - Urine, Urine, Clean Catch [[id number]] Collected: 01/08/23 1135    Specimen: Urine, Clean Catch Updated: 01/08/23 1206    Urinalysis With Culture If Indicated - Urine, Clean Catch [[id number]]  (Abnormal) Collected: 01/08/23 1135    Specimen: Urine, Clean Catch Updated: 01/08/23 1152     Color, UA Yellow     Appearance, UA Clear     pH, UA 7.0     Specific Smiley, UA 1.084     Comment: Result obtained by Refractometer        Glucose, UA Negative     Ketones, UA Negative     Bilirubin, UA Negative     Blood, UA Moderate (2+)     Protein, UA 30 mg/dL (1+)     Leuk Esterase, UA Small (1+)     Nitrite, UA Positive     Urobilinogen, UA 0.2 E.U./dL    Narrative:      In absence of clinical symptoms, the presence of pyuria, bacteria, and/or nitrites on the urinalysis result does not correlate with infection.    Green Top (Gel) [[id number]] Collected: 01/08/23 1010    Specimen: Blood Updated: 01/08/23 1116     Extra Tube Hold for add-ons.     Comment: Auto resulted.       Lavender Top [[id number]] Collected: 01/08/23 1010    Specimen: Blood Updated: 01/08/23 1116     Extra Tube hold for add-on     Comment: Auto resulted       Light Blue Top [[id number]] Collected: 01/08/23 1010    Specimen: Blood Updated: 01/08/23 1116     Extra Tube Hold for add-ons.     Comment: Auto resulted       POC Glucose Once [[id number]]  (Abnormal) Collected: 01/08/23 1002    Specimen: Blood Updated: 01/08/23 1104     Glucose 142 mg/dL      Comment: Result Not ConfirmedOperator: [id number] SRI Esquivel ID: [id number]       Comprehensive Metabolic Panel [[id number]]  (Abnormal) Collected: 01/08/23 1010    Specimen: Blood Updated: 01/08/23 1041     Glucose 152 mg/dL      BUN 8 mg/dL      Creatinine 0.82 mg/dL      Sodium 135 mmol/L      Potassium 4.0 mmol/L      Comment: Slight hemolysis detected by analyzer. Results may be affected.        Chloride 99 mmol/L      CO2 24.0 mmol/L       Calcium 9.4 mg/dL      Total Protein 7.2 g/dL      Albumin 4.1 g/dL      ALT (SGPT) 26 U/L      AST (SGOT) 25 U/L      Alkaline Phosphatase 143 U/L      Total Bilirubin 0.6 mg/dL      Globulin 3.1 gm/dL      A/G Ratio 1.3 g/dL      BUN/Creatinine Ratio 9.8     Anion Gap 12.0 mmol/L      eGFR 76.1 mL/min/1.73      Comment: National Kidney Foundation and American Society of Nephrology (ASN) Task Force recommended calculation based on the Chronic Kidney Disease Epidemiology Collaboration (CKD-EPI) equation refit without adjustment for race.       Narrative:      GFR Normal >60  Chronic Kidney Disease <60  Kidney Failure <15    The GFR formula is only valid for adults with stable renal function between ages 18 and 70.    Protime-INR [[id number]]  (Normal) Collected: 01/08/23 1010    Specimen: Blood Updated: 01/08/23 1038     Protime 13.7 Seconds      INR 1.06    Narrative:      Therapeutic range for most indications is 2.0-3.0 INR,  or 2.5-3.5 for mechanical heart valves.    aPTT [[id number]]  (Normal) Collected: 01/08/23 1010    Specimen: Blood Updated: 01/08/23 1038     PTT 33.4 seconds     Narrative:      The recommended Heparin therapeutic range is 68-97 seconds.    Troponin [[id number]]  (Normal) Collected: 01/08/23 1010    Specimen: Blood Updated: 01/08/23 1033     Troponin T <0.010 ng/mL     Narrative:      Troponin T Reference Range:  <= 0.03 ng/mL-   Negative for AMI  >0.03 ng/mL-     Abnormal for myocardial necrosis.  Clinicians would have to utilize clinical acumen, EKG, Troponin and serial changes to determine if it is an Acute Myocardial Infarction or myocardial injury due to an underlying chronic condition.       Results may be falsely decreased if patient taking Biotin.      CBC & Differential [[id number]]  (Abnormal) Collected: 01/08/23 1010    Specimen: Blood Updated: 01/08/23 1016    Narrative:      The following orders were created for panel order CBC & Differential.  Procedure                                Abnormality         Status                     ---------                               -----------         ------                     CBC Auto Differential[[id number]]        Abnormal            Final result                 Please view results for these tests on the individual orders.    CBC Auto Differential [[id number]]  (Abnormal) Collected: 01/08/23 1010    Specimen: Blood Updated: 01/08/23 1016     WBC 8.44 10*3/mm3      RBC 5.09 10*6/mm3      Hemoglobin 13.9 g/dL      Hematocrit 42.8 %      MCV 84.1 fL      MCH 27.3 pg      MCHC 32.5 g/dL      RDW 14.1 %      RDW-SD 43.1 fl      MPV 9.9 fL      Platelets 185 10*3/mm3      Neutrophil % 58.1 %      Lymphocyte % 28.9 %      Monocyte % 6.9 %      Eosinophil % 4.9 %      Basophil % 0.8 %      Immature Grans % 0.4 %      Neutrophils, Absolute 4.91 10*3/mm3      Lymphocytes, Absolute 2.44 10*3/mm3      Monocytes, Absolute 0.58 10*3/mm3      Eosinophils, Absolute 0.41 10*3/mm3      Basophils, Absolute 0.07 10*3/mm3      Immature Grans, Absolute 0.03 10*3/mm3      nRBC 0.0 /100 WBC         Imaging Results (Last 24 Hours)     Procedure Component Value Units Date/Time    XR Chest 1 View [[id number]] Collected: 01/08/23 1120     Updated: 01/08/23 1211    Narrative:      PROCEDURE: Portable chest x-ray    TECHNIQUE: Single frontal view of the chest    COMPARISON: 2/28/2022    HISTORY: Acute Stroke Protocol (onset < 12 hrs)    FINDINGS:  The lungs appear clear. Heart, hilar, and mediastinal structures  appear normal. No osseous abnormality is seen. Multiple punctate  calcific nodules are noted bilaterally which appear similar to  that on the previous exam and are likely due to old granulomatous  disease.      Impression:      No acute pulmonary disease.    Electronically signed by:  Ariel Degroot MD  1/8/2023 12:08 PM CST  Workstation: [id number]    CT Angiogram Head w JOSE C Analysis of LVO [[id number]] Collected: 01/08/23 1025     Updated: 01/08/23 1150    Narrative:       PROCEDURE: CT HEAD ANGIOGRAPHY WITHOUT THEN WITH IV CONTRAST, CT  NECK ANGIOGRAPHY WITHOUT THEN WITH IV CONTRAST, CT HEAD PERFUSION  WITHOUT AND WITH IV CONTRAST    CLINICAL HISTORY: Stroke, follow up  Acute Stroke.    COMPARISON: CT neck dated 2/28/2022. Head CT dated 12/11/2014.    TECHNIQUE:   CT angiography of the head and neck was performed with  intravenous contrast on the level of the aortic arch to the  vertex of the brain in orthogonal planes, along with 3D  reconstruction of the arterial vasculature of the head and neck  from the source images.     Viz-AI: Study has been analyzed with deep learning artificial  intelligence algorithm for large vessel occlusion detection.    Measurement of carotid stenosis is based on NASCET criteria which  calculates the percentage of stenosis relative to the luminal  diameter of the normal carotid artery distal to the stenosis.    CONTRAST: Total of 140 mL IV Isovue 370    This exam was performed using radiation doses that are as low as  reasonably achievable (ALARA).  This exam was performed according to our departmental dose  optimization program, which includes automated exposure control,  adjustment of the mA and/or KV according to patient size and/or  use of iterative reconstruction technique.    FINDINGS:   Encephalomalacia in the periphery of the left cerebral hemisphere  likely due to an old infarct.    HEAD:    RIGHT ANTERIOR CEREBRAL ARTERY: No acute findings. No occlusion  or significant stenosis. No aneurysm.    RIGHT MIDDLE CEREBRAL ARTERY: No acute findings. No occlusion or  significant stenosis. No aneurysm.    RIGHT POSTERIOR CEREBRAL ARTERY: No acute findings. No occlusion  or significant stenosis. No aneurysm.    RIGHT INTRACRANIAL INTERNAL CAROTID ARTERY: No acute findings. No  significant stenosis. No dissection or occlusion.    RIGHT INTRACRANIAL VERTEBRAL ARTERY: No acute findings. No  significant stenosis. No dissection or occlusion.      LEFT  ANTERIOR CEREBRAL ARTERY: No acute findings. No occlusion or  significant stenosis. No aneurysm.    LEFT MIDDLE CEREBRAL ARTERY: Less than 45% flow, likely due to to  the presence of an old infarct. No acute findings. No occlusion  or significant stenosis. No aneurysm.    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 No acute  findings. No occlusion or significant stenosis. No aneurysm.    LEFT INTRACRANIAL INTERNAL CAROTID ARTERY: No acute findings. No  significant stenosis. No dissection or occlusion.    LEFT INTRACRANIAL VERTEBRAL ARTERY: No acute findings. No  significant stenosis. No dissection or occlusion.      BASILAR ARTERY: No acute findings. No occlusion or significant  stenosis. No aneurysm.      NECK:    RIGHT COMMON CAROTID ARTERY: No acute findings. No significant  stenosis. No dissection or occlusion.    RIGHT EXTRACRANIAL INTERNAL CAROTID ARTERY: No acute findings. No  significant stenosis. No dissection or occlusion.    RIGHT EXTERNAL CAROTID ARTERY: No acute findings. No occlusion.   RIGHT   EXTRACRANIAL VERTEBRAL ARTERY: No acute findings. No significant  stenosis. No dissection or occlusion.      LEFT COMMON CAROTID ARTERY: No acute findings. No significant  stenosis. No dissection or occlusion.    LEFT EXTRACRANIAL INTERNAL CAROTID ARTERY: No acute findings. No  significant stenosis. No dissection or occlusion.    LEFT EXTERNAL CAROTID ARTERY: No acute findings. No occlusion.    LEFT EXTRACRANIAL VERTEBRAL ARTERY: Congenitally small. Arises  from collaterals rather than from the usual location on the left  subclavian artery. No acute findings.       CAROTID STENOSIS REFERENCE USING NASCET CRITERIA: % ICA stenosis  = (1 - narrowest ICA diameter/diameter of distal cervical ICA) x  100. Mild - <50% stenosis. Moderate - 50-69% stenosis. Severe -  70-94% stenosis. Near occlusion - 95-99% stenosis. Occluded -    Impression:      100% stenosis.  IMPRESSION: No large vessel occlusion  or  significant stenosis in the arteries of the head and neck.    CT PERFUSION FINDINGS:  HCC focus of cerebral blood flow less than 30% located in the  left posterior frontal or parietal region. No areas of Tmax  greater than six seconds visualized. Findings are likely due to  the old infarct in this area.  The time to peak, mean transit time, cerebral blood flow, and  cerebral blood volume maps are otherwise symmetric and normal.  There is no ischemic core or penumbra.    CBF < 30% VOLUME: 8 mL.  TMAX > 6.0sec VOLUME: 0 mL.  MISMATCH VOLUME: -8 mL.  MISMATCH RATIO: 0 mL.    Additional findings: Degenerative changes of the cervical spine.  Fusion of C2 and C3 vertebral bodies.    IMPRESSION:  No acute CTA head and neck abnormality.  Abnormal perfusion findings of less than 30% cerebral blood flow  with a volume of 8 mL in the left MCA territory.  Less than 45% flow in this area seen on the blood vessel density  images with rapid AI.  Proximal aspect of the left vertebral artery nonopacified. 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 These findings are  chronic and are likely congenital.    Electronically signed by:  Ariel Degroot MD  1/8/2023 11:48 AM CST  Workstation: [id number]    CT Angiogram Neck [[id number]] Collected: 01/08/23 1025     Updated: 01/08/23 1150    Narrative:      PROCEDURE: CT HEAD ANGIOGRAPHY WITHOUT THEN WITH IV CONTRAST, CT  NECK ANGIOGRAPHY WITHOUT THEN WITH IV CONTRAST, CT HEAD PERFUSION  WITHOUT AND WITH IV CONTRAST    CLINICAL HISTORY: Stroke, follow up  Acute Stroke.    COMPARISON: CT neck dated 2/28/2022. Head CT dated 12/11/2014.    TECHNIQUE:   CT angiography of the head and neck was performed with  intravenous contrast on the level of the aortic arch to the  vertex of the brain in orthogonal planes, along with 3D  reconstruction of the arterial vasculature of the head and neck  from the source images.     Viz-AI: Study  has been analyzed with deep learning artificial  intelligence algorithm for large vessel occlusion detection.    Measurement of carotid stenosis is based on NASCET criteria which  calculates the percentage of stenosis relative to the luminal  diameter of the normal carotid artery distal to the stenosis.    CONTRAST: Total of 140 mL IV Isovue 370    This exam was performed using radiation doses that are as low as  reasonably achievable (ALARA).  This exam was performed according to our departmental dose  optimization program, which includes automated exposure control,  adjustment of the mA and/or KV according to patient size and/or  use of iterative reconstruction technique.    FINDINGS:   Encephalomalacia in the periphery of the left cerebral hemisphere  likely due to an old infarct.    HEAD:    RIGHT ANTERIOR CEREBRAL ARTERY: No acute findings. No occlusion  or significant stenosis. No aneurysm.    RIGHT MIDDLE CEREBRAL ARTERY: No acute findings. No occlusion or  significant stenosis. No aneurysm.    RIGHT POSTERIOR CEREBRAL ARTERY: No acute findings. No occlusion  or significant stenosis. No aneurysm.    RIGHT INTRACRANIAL INTERNAL CAROTID ARTERY: No acute findings. No  significant stenosis. No dissection or occlusion.    RIGHT INTRACRANIAL VERTEBRAL ARTERY: No acute findings. No  significant stenosis. No dissection or occlusion.      LEFT ANTERIOR CEREBRAL ARTERY: No acute findings. No occlusion or  significant stenosis. No aneurysm.    LEFT MIDDLE CEREBRAL ARTERY: Less than 45% flow, likely due to to  the presence of an old infarct. No acute findings. No occlusion  or significant stenosis. No aneurysm.    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 No acute  findings. No occlusion or significant stenosis. No aneurysm.    LEFT INTRACRANIAL INTERNAL CAROTID ARTERY: No acute findings. No  significant stenosis. No dissection or occlusion.    LEFT INTRACRANIAL VERTEBRAL ARTERY: No acute findings.  No  significant stenosis. No dissection or occlusion.      BASILAR ARTERY: No acute findings. No occlusion or significant  stenosis. No aneurysm.      NECK:    RIGHT COMMON CAROTID ARTERY: No acute findings. No significant  stenosis. No dissection or occlusion.    RIGHT EXTRACRANIAL INTERNAL CAROTID ARTERY: No acute findings. No  significant stenosis. No dissection or occlusion.    RIGHT EXTERNAL CAROTID ARTERY: No acute findings. No occlusion.   RIGHT   EXTRACRANIAL VERTEBRAL ARTERY: No acute findings. No significant  stenosis. No dissection or occlusion.      LEFT COMMON CAROTID ARTERY: No acute findings. No significant  stenosis. No dissection or occlusion.    LEFT EXTRACRANIAL INTERNAL CAROTID ARTERY: No acute findings. No  significant stenosis. No dissection or occlusion.    LEFT EXTERNAL CAROTID ARTERY: No acute findings. No occlusion.    LEFT EXTRACRANIAL VERTEBRAL ARTERY: Congenitally small. Arises  from collaterals rather than from the usual location on the left  subclavian artery. No acute findings.       CAROTID STENOSIS REFERENCE USING NASCET CRITERIA: % ICA stenosis  = (1 - narrowest ICA diameter/diameter of distal cervical ICA) x  100. Mild - <50% stenosis. Moderate - 50-69% stenosis. Severe -  70-94% stenosis. Near occlusion - 95-99% stenosis. Occluded -    Impression:      100% stenosis.  IMPRESSION: No large vessel occlusion or  significant stenosis in the arteries of the head and neck.    CT PERFUSION FINDINGS:  HCC focus of cerebral blood flow less than 30% located in the  left posterior frontal or parietal region. No areas of Tmax  greater than six seconds visualized. Findings are likely due to  the old infarct in this area.  The time to peak, mean transit time, cerebral blood flow, and  cerebral blood volume maps are otherwise symmetric and normal.  There is no ischemic core or penumbra.    CBF < 30% VOLUME: 8 mL.  TMAX > 6.0sec VOLUME: 0 mL.  MISMATCH VOLUME: -8 mL.  MISMATCH RATIO: 0  mL.    Additional findings: Degenerative changes of the cervical spine.  Fusion of C2 and C3 vertebral bodies.    IMPRESSION:  No acute CTA head and neck abnormality.  Abnormal perfusion findings of less than 30% cerebral blood flow  with a volume of 8 mL in the left MCA territory.  Less than 45% flow in this area seen on the blood vessel density  images with rapid AI.  Proximal aspect of the left vertebral artery nonopacified. 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 These findings are  chronic and are likely congenital.    Electronically signed by:  Ariel Degroot MD  1/8/2023 11:48 AM CST  Workstation: [id number]    CT CEREBRAL PERFUSION WITH & WITHOUT CONTRAST [[id number]] Collected: 01/08/23 1015     Updated: 01/08/23 1150    Narrative:      PROCEDURE: CT HEAD ANGIOGRAPHY WITHOUT THEN WITH IV CONTRAST, CT  NECK ANGIOGRAPHY WITHOUT THEN WITH IV CONTRAST, CT HEAD PERFUSION  WITHOUT AND WITH IV CONTRAST    CLINICAL HISTORY: Stroke, follow up  Acute Stroke.    COMPARISON: CT neck dated 2/28/2022. Head CT dated 12/11/2014.    TECHNIQUE:   CT angiography of the head and neck was performed with  intravenous contrast on the level of the aortic arch to the  vertex of the brain in orthogonal planes, along with 3D  reconstruction of the arterial vasculature of the head and neck  from the source images.     Viz-AI: Study has been analyzed with deep learning artificial  intelligence algorithm for large vessel occlusion detection.    Measurement of carotid stenosis is based on NASCET criteria which  calculates the percentage of stenosis relative to the luminal  diameter of the normal carotid artery distal to the stenosis.    CONTRAST: Total of 140 mL IV Isovue 370    This exam was performed using radiation doses that are as low as  reasonably achievable (ALARA).  This exam was performed according to our departmental dose  optimization program, which includes  automated exposure control,  adjustment of the mA and/or KV according to patient size and/or  use of iterative reconstruction technique.    FINDINGS:   Encephalomalacia in the periphery of the left cerebral hemisphere  likely due to an old infarct.    HEAD:    RIGHT ANTERIOR CEREBRAL ARTERY: No acute findings. No occlusion  or significant stenosis. No aneurysm.    RIGHT MIDDLE CEREBRAL ARTERY: No acute findings. No occlusion or  significant stenosis. No aneurysm.    RIGHT POSTERIOR CEREBRAL ARTERY: No acute findings. No occlusion  or significant stenosis. No aneurysm.    RIGHT INTRACRANIAL INTERNAL CAROTID ARTERY: No acute findings. No  significant stenosis. No dissection or occlusion.    RIGHT INTRACRANIAL VERTEBRAL ARTERY: No acute findings. No  significant stenosis. No dissection or occlusion.      LEFT ANTERIOR CEREBRAL ARTERY: No acute findings. No occlusion or  significant stenosis. No aneurysm.    LEFT MIDDLE CEREBRAL ARTERY: Less than 45% flow, likely due to to  the presence of an old infarct. No acute findings. No occlusion  or significant stenosis. No aneurysm.    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 No acute  findings. No occlusion or significant stenosis. No aneurysm.    LEFT INTRACRANIAL INTERNAL CAROTID ARTERY: No acute findings. No  significant stenosis. No dissection or occlusion.    LEFT INTRACRANIAL VERTEBRAL ARTERY: No acute findings. No  significant stenosis. No dissection or occlusion.      BASILAR ARTERY: No acute findings. No occlusion or significant  stenosis. No aneurysm.      NECK:    RIGHT COMMON CAROTID ARTERY: No acute findings. No significant  stenosis. No dissection or occlusion.    RIGHT EXTRACRANIAL INTERNAL CAROTID ARTERY: No acute findings. No  significant stenosis. No dissection or occlusion.    RIGHT EXTERNAL CAROTID ARTERY: No acute findings. No occlusion.   RIGHT   EXTRACRANIAL VERTEBRAL ARTERY: No acute findings. No significant  stenosis. No dissection or  occlusion.      LEFT COMMON CAROTID ARTERY: No acute findings. No significant  stenosis. No dissection or occlusion.    LEFT EXTRACRANIAL INTERNAL CAROTID ARTERY: No acute findings. No  significant stenosis. No dissection or occlusion.    LEFT EXTERNAL CAROTID ARTERY: No acute findings. No occlusion.    LEFT EXTRACRANIAL VERTEBRAL ARTERY: Congenitally small. Arises  from collaterals rather than from the usual location on the left  subclavian artery. No acute findings.       CAROTID STENOSIS REFERENCE USING NASCET CRITERIA: % ICA stenosis  = (1 - narrowest ICA diameter/diameter of distal cervical ICA) x  100. Mild - <50% stenosis. Moderate - 50-69% stenosis. Severe -  70-94% stenosis. Near occlusion - 95-99% stenosis. Occluded -    Impression:      100% stenosis.  IMPRESSION: No large vessel occlusion or  significant stenosis in the arteries of the head and neck.    CT PERFUSION FINDINGS:  HCC focus of cerebral blood flow less than 30% located in the  left posterior frontal or parietal region. No areas of Tmax  greater than six seconds visualized. Findings are likely due to  the old infarct in this area.  The time to peak, mean transit time, cerebral blood flow, and  cerebral blood volume maps are otherwise symmetric and normal.  There is no ischemic core or penumbra.    CBF < 30% VOLUME: 8 mL.  TMAX > 6.0sec VOLUME: 0 mL.  MISMATCH VOLUME: -8 mL.  MISMATCH RATIO: 0 mL.    Additional findings: Degenerative changes of the cervical spine.  Fusion of C2 and C3 vertebral bodies.    IMPRESSION:  No acute CTA head and neck abnormality.  Abnormal perfusion findings of less than 30% cerebral blood flow  with a volume of 8 mL in the left MCA territory.  Less than 45% flow in this area seen on the blood vessel density  images with rapid AI.  Proximal aspect of the left vertebral artery nonopacified. 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 These findings  are  chronic and are likely congenital.    Electronically signed by:  Ariel Degroot MD  1/8/2023 11:48 AM CST  Workstation: [id number]    CT Head Without Contrast Stroke Protocol [[id number]] Collected: 01/08/23 1015     Updated: 01/08/23 1051    Narrative:      EXAMINATION:  CT SCAN OF THE HEAD WITHOUT INTRAVENOUS CONTRAST    CLINICAL INFORMATION:  Neuro deficit, acute, stroke suspected    This exam was performed using radiation doses that are as low as  reasonably achievable (ALARA).  This exam was performed according to our departmental dose  optimization program, which includes automated exposure control,  adjustment of the mA and/or KV according to patient size and/or  use of iterative reconstruction technique.    COMPARISON: Head CT dated 12/11/2014. CTA neck dated 2/28/2022      TECHNIQUE:  Axial images from skull base to vertex.        FINDINGS:  Area of low density in the left temporal, frontal and parietal  region suspicious for an old infarct. Similar in appearance to  the previous CTA neck.  There is no evidence of intracranial hemorrhage, parenchymal  mass, midline shift, or focal mass effect.  Prominence of the lateral and third ventricles and cerebral  sulci, probably due to age-related volume loss.  There is no extra-axial hemorrhage or collection identified.  The mastoid air cells and visualized paranasal sinuses appear  clear.      Impression:      No evidence of intracranial hemorrhage, mass effect or large  acute infarct.  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    Electronically signed by:  Ariel Degroot MD  1/8/2023 10:49 AM CST  Workstation: [id number]            Assessment:    Active Hospital Problems    Diagnosis    • **Dysarthria              Plan:          Possible CVA:  Possible TIA  Neurology on board  Neurochecks  Fasting blood panel, hemoglobin A1c  Continue aspirin and Plavix per home dose  Further imaging per neurology  recommendation  Echocardiogram      Urinary tract infection  Blood cultures in process  Urine culture in process  Urinalysis positive  Continue IV ceftriaxone    Dyslipidemia:  Lipitor 80 mg daily    Hypertension:  Hold antihypertensive medication for permissive hypertension    Depression:  Continue Lexapro per home dose    Hypothyroidism:  Continue levothyroxine per home dose    Anxiety:  Continue Valium per home dose as needed        Patient is full code  Estimated length of stay: Greater than 2 midnights  DVT prophylaxis SCDs                    I discussed the patient's findings and my recommendations with: Patient and the     Yani Garrison MD

## 2023-01-08 NOTE — Clinical Note
[facility] EMERGENCY DEPARTMENT  [address] [place] [id number]  Phone: [phone]    ranjit ramirez accompanied Ave Ramirez to the emergency department on 1/8/2023. They may return to work on 01/09/2023.        Thank you for choosing [facility].    Letty Saavedra, RN

## 2023-01-08 NOTE — ED NOTES
Nursing report ED to floor  Ave Ramirez  73 y.o.  female    HPI:   Chief Complaint   Patient presents with    Abdominal Pain       Admitting doctor:   Yani Garrison MD    Consulting provider(s):  Consults       Date and Time Order Name Status Description    1/8/2023  9:59 AM Inpatient Neurology Consult Stroke      1/8/2023  9:59 AM Inpatient Neurology Consult Stroke               Admitting diagnosis:   The primary encounter diagnosis was Dysarthria. A diagnosis of Urinary tract infection with hematuria, site unspecified was also pertinent to this visit.    Code status:   Current Code Status       Date Active Code Status Order ID Comments User Context       Prior            Allergies:   Penicillins and Lisinopril    Intake and Output  No intake or output data in the 24 hours ending 01/08/23 1418    Weight:       01/08/23  1108   Weight: 77.1 kg (170 lb)       Most recent vitals:   Vitals:    01/08/23 1108 01/08/23 1111 01/08/23 1208 01/08/23 1338   BP:  153/67 148/79 136/65   BP Location:  Left arm     Patient Position:  Sitting     Pulse:  90 88 90   Resp:  18  19   Temp:       TempSrc:       SpO2:  94% 94% 95%   Weight: 77.1 kg (170 lb)      Height:         Oxygen Therapy: No    Active LDAs/IV Access:   Lines, Drains & Airways       Active LDAs       Name Placement date Placement time Site Days    Peripheral IV 01/08/23 1012 Anterior;Left Wrist 01/08/23  1012  Wrist  less than 1    Peripheral IV 01/08/23 0949 Right Antecubital 01/08/23  0949  Antecubital  less than 1                    Labs (abnormal labs have a star):   Labs Reviewed   COMPREHENSIVE METABOLIC PANEL - Abnormal; Notable for the following components:       Result Value    Glucose 152 (*)     Sodium 135 (*)     Alkaline Phosphatase 143 (*)     All other components within normal limits    Narrative:     GFR Normal >60  Chronic Kidney Disease <60  Kidney Failure <15    The GFR formula is only valid for adults with stable renal function between  ages 18 and 70.   CBC WITH AUTO DIFFERENTIAL - Abnormal; Notable for the following components:    Eosinophils, Absolute 0.41 (*)     All other components within normal limits   URINALYSIS W/ CULTURE IF INDICATED - Abnormal; Notable for the following components:    Specific Gravity, UA 1.084 (*)     Blood, UA Moderate (2+) (*)     Protein, UA 30 mg/dL (1+) (*)     Leuk Esterase, UA Small (1+) (*)     Nitrite, UA Positive (*)     All other components within normal limits    Narrative:     In absence of clinical symptoms, the presence of pyuria, bacteria, and/or nitrites on the urinalysis result does not correlate with infection.   URINALYSIS, MICROSCOPIC ONLY - Abnormal; Notable for the following components:    RBC, UA 31-50 (*)     WBC, UA 6-12 (*)     Bacteria, UA 1+ (*)     All other components within normal limits   LACTIC ACID, PLASMA - Abnormal; Notable for the following components:    Lactate 2.2 (*)     All other components within normal limits   POCT GLUCOSE FINGERSTICK - Abnormal; Notable for the following components:    Glucose 142 (*)     All other components within normal limits   PROTIME-INR - Normal    Narrative:     Therapeutic range for most indications is 2.0-3.0 INR,  or 2.5-3.5 for mechanical heart valves.   APTT - Normal    Narrative:     The recommended Heparin therapeutic range is 68-97 seconds.   TROPONIN (IN-HOUSE) - Normal    Narrative:     Troponin T Reference Range:  <= 0.03 ng/mL-   Negative for AMI  >0.03 ng/mL-     Abnormal for myocardial necrosis.  Clinicians would have to utilize clinical acumen, EKG, Troponin and serial changes to determine if it is an Acute Myocardial Infarction or myocardial injury due to an underlying chronic condition.       Results may be falsely decreased if patient taking Biotin.     BLOOD CULTURE   BLOOD CULTURE   URINE CULTURE   RAINBOW DRAW    Narrative:     The following orders were created for panel order [facility] Draw.  Procedure                                Abnormality         Status                     ---------                               -----------         ------                     Green Top (Gel)[[id number]]                                  Final result               Lavender Top[[id number]]                                     Final result               Gold Top - SST[[id number]]                                                              Light Blue Top[[id number]]                                   Final result                 Please view results for these tests on the individual orders.   LACTIC ACID, REFLEX   POCT GLUCOSE FINGERSTICK   CBC AND DIFFERENTIAL    Narrative:     The following orders were created for panel order CBC & Differential.  Procedure                               Abnormality         Status                     ---------                               -----------         ------                     CBC Auto Differential[[id number]]        Abnormal            Final result                 Please view results for these tests on the individual orders.   GREEN TOP   LAVENDER TOP   LIGHT BLUE TOP   EXTRA TUBES    Narrative:     The following orders were created for panel order Extra Tubes.  Procedure                               Abnormality         Status                     ---------                               -----------         ------                     Lavender Top[[id number]]                                     Final result               Green Top (Gel)[[id number]]                                  Final result                 Please view results for these tests on the individual orders.   LAVENDER TOP   GREEN TOP       Meds given in ED:   Medications   sodium chloride 0.9 % flush 10 mL (has no administration in time range)   iopamidol (ISOVUE-370) 76 % injection 100 mL (90 mL Intravenous Given 1/8/23 1028)   iopamidol (ISOVUE-370) 76 % injection 100 mL (50 mL Intravenous Given 1/8/23 1047)   cefTRIAXone (ROCEPHIN) 1 g/100 mL 0.9% NS (MBP) (1  g Intravenous New Bag 1/8/23 1343)           NIH Stroke Scale:  Interval: baseline    Isolation/Infection(s):  No active isolations   No active infections     COVID Testing  Collected NA  Resulted NA    Nursing report ED to floor:  Mental status: Alert  Ambulatory status: Wheelchair   Precautions: None    ED nurse phone extentsion- [phone]

## 2023-01-08 NOTE — CONSULTS
[facility]   Teleneurology Note    Patient Name: Ave Ramirez  : 1950  MRN: [id number]  Primary Care Physician: Anali Sheppard DO  Referring Site: [facility]    Subjective   Teleneurology Initial Data     Arrival Date Telestroke Site: 23           Date Last Known Well: 23  Last Known Well: Time Unknown       Stroke Risk Factors/ Pertinent Data     Stroke risk factors: prior stroke/ TIA  Anticoagulants prior to arrival: none  Antiplatelets prior to arrival: aspirin     Pre- Stroke Modified Thurston Scale     Pre-Stroke Modified Thurston Scale: 2 - Slight disability.  Unable to carry out all previous activities but able to look after own affairs without assistance.    NIH Stroke Scale     NIHSS Performed Date: 23     Interval: baseline  1a. Level of Consciousness: 0-->Alert, keenly responsive  1b. LOC Questions: 0-->Answers both questions correctly  1c. LOC Commands: 0-->Performs both tasks correctly  2. Best Gaze: 0-->Normal  3. Visual: 0-->No visual loss  4. Facial Palsy: 1-->Minor paralysis (flattened nasolabial fold, asymmetry on smiling)  5a. Motor Arm, Left: 0-->No drift, limb holds 90 (or 45) degrees for full 10 secs  5b. Motor Arm, Right: 0-->No drift, limb holds 90 (or 45) degrees for full 10 secs  6a. Motor Leg, Left: 1-->Drift, leg falls by the end of the 5-sec period but does not hit bed  6b. Motor Leg, Right: 1-->Drift, leg falls by the end of the 5-sec period but does not hit bed  7. Limb Ataxia: 1-->Present in one limb  8. Sensory: 1-->Mild-to-moderate sensory loss, patient feels pinprick is less sharp or is dull on the affected side, or there is a loss of superficial pain with pinprick, but patient is aware of being touched  9. Best Language: 1-->Mild-to-moderate aphasia, some obvious loss of fluency or facility of comprehension, without significant limitation on ideas expressed or form of expression. Reduction of speech and/or comprehension, however, makes  conversation. . . (see row details)  10. Dysarthria: 1-->Mild-to-moderate dysarthria, patient slurs at least some words and, at worst, can be understood with some difficulty  11. Extinction and Inattention (formerly Neglect): 0-->No abnormality  Total (NIH Stroke Scale): 7     Results     Personal review of CNS imaging:(Official report by radiologist pending)  Imaging  CT Imaging Review: CT Imaging reviewed, NO acute infarct/ hemorrhage seen    Thrombolytic   Thrombolytics: tPA not given  Tissue Plasminogen Activator (tPA) Exclusion Criteria: Onset unknown or GREATER than 4.5 hours     Assessment & Plan   Assessment/ Plan       74 yo hx of left MCa ischemic stroke in January 2021 with mild residual aphasia presenting with speech changes.     Patient presents with  who helps provide history given patient's aphasia.    He reports at baseline since her stroke in 2021 she has mild expressive aphasia and word finding difficulty. She went to bed last night in her usual state of health. This AM patient woke up and is unable to produce any intelligible speech. No unilateral weakness noted. No recent illnesses. No changes in medications     On examination NIHSS 10   Ms. Ramirez is seen sitting up in bed   She attends to examiner  She appears to attempt to answer questions and name objects but does not produce intelligible speech.    No visual field cut   Face symmetric   No drift in bilateral upper extremities  No drift in bilateral lower extremities   Sensation intact to light touch bilaterally  Finger-nose-finger intact bilaterally   Gait not tested.     MRI report from 01/23/2021 (images not available for review)  1. Large left MCA territory infarct with scattered blood products and mild parenchymal swelling.   2. Additional scattered punctate infarcts along the watershed territories, left PCA, and possibly left corpus callosum.     CT CTa  No acute CTA head and neck abnormality.  Abnormal perfusion findings of less  than 30% cerebral blood flow  with a volume of 8 mL in the left MCA territory.  Less than 45% flow in this area seen on the blood vessel density  images with rapid AI.  Proximal aspect of the left vertebral artery nonopacified. 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 These findings are  chronic and are likely congenital.  Area of hypoperfusion in territory of old ischemic stroke that would correlate with symptoms         A/P   72 yo hx of left MCa ischemic stroke in January 2021 with mild residual aphasia presenting with speech changes.     #Dense isolated expressive aphasia, comprehension intact   #hx of left MCA ischemic stroke, January 2021   Etiology - recrudescence vs new ischemia. No clear trigger identified for recrudesence at this time.   - toxic metabolic work up per primary  - permissive hypertension until MRI complete   - continue home aspirin   - [ ] Atorvastatin 80mg    - [ ] MRI brain with and without contrast   - [ ] Telemetry   - [ ] Echocardiogram   - [ ] Stroke labs - lipids, hgbA1c  - [ ] 28 day holter monitor at discharge   - [ ] SLP, PT and OT evaluation           Kim Shipley MD  We will continue to follow   If you have any questions about the patient recommendations,   please call [phone] at anytime and ask to speak with the neurologist on call.   Press 1 for an emergent consult and 2 for a non-emergent consult.      Disposition     Medical Decision Making  Medical Data Reviewed: Data reviewed including: clinical labs, radiology and/or medical tests  Length of visit: 60 minutes    IKim MD, saw the patient on   at   for an initial in-patient or emergency room telememedicine face to face consult using interactive technology for 60 minutes. The location of the patient was [place].    I have proceeded with this evaluation at the request of the referring practitioner as it is felt to be an  emergency setting and no appropriate specialist is available to perform this evaluation. The originating hospital has reported that this is the correct patient and has obtained consent from the patient/surrogate to perform this telemedicine evaluation(including obtaining history, performing examination and reviewing data provided by the patient an/or originating site of care provider)    I have introduced myself to the patient, provided my credentials, disclosed my location, and determined that, based on review of the patient's chart and discussion with the patient's primary team, telemedicine via a HIPAA compliant, real-time, face-to-face two-way, interactive audio and video platform is an appropriate and effective means of providing the service.      Kim Shipley MD

## 2023-01-08 NOTE — Clinical Note
[facility] EMERGENCY DEPARTMENT  [address] [place] [id number]  Phone: [phone]    kevon ramirez accompanied vAe Ramirez to the emergency department on 1/8/2023. They may return to work on 01/09/2023.        Thank you for choosing [facility].    Letty Saavedra, RN

## 2023-01-09 ENCOUNTER — APPOINTMENT (OUTPATIENT)
Dept: MRI IMAGING | Facility: HOSPITAL | Age: 73
DRG: 690 | End: 2023-01-09
Payer: MEDICARE

## 2023-01-09 ENCOUNTER — APPOINTMENT (OUTPATIENT)
Dept: CT IMAGING | Facility: HOSPITAL | Age: 73
DRG: 690 | End: 2023-01-09
Payer: MEDICARE

## 2023-01-09 ENCOUNTER — APPOINTMENT (OUTPATIENT)
Dept: CARDIOLOGY | Facility: HOSPITAL | Age: 73
DRG: 690 | End: 2023-01-09
Payer: MEDICARE

## 2023-01-09 ENCOUNTER — APPOINTMENT (OUTPATIENT)
Dept: GENERAL RADIOLOGY | Facility: HOSPITAL | Age: 73
DRG: 690 | End: 2023-01-09
Payer: MEDICARE

## 2023-01-09 LAB
ALBUMIN SERPL-MCNC: 3.8 G/DL (ref 3.5–5.2)
ALBUMIN/GLOB SERPL: 1.5 G/DL
ALP SERPL-CCNC: 120 U/L (ref 39–117)
ALT SERPL W P-5'-P-CCNC: 23 U/L (ref 1–33)
ANION GAP SERPL CALCULATED.3IONS-SCNC: 10 MMOL/L (ref 5–15)
AST SERPL-CCNC: 23 U/L (ref 1–32)
BASOPHILS # BLD AUTO: 0.06 10*3/MM3 (ref 0–0.2)
BASOPHILS NFR BLD AUTO: 0.8 % (ref 0–1.5)
BH CV ECHO MEAS - ACS: 1.61 CM
BH CV ECHO MEAS - AO MAX PG: 6.1 MMHG
BH CV ECHO MEAS - AO MEAN PG: 3.6 MMHG
BH CV ECHO MEAS - AO ROOT DIAM: 2.9 CM
BH CV ECHO MEAS - AO V2 MAX: 123.8 CM/SEC
BH CV ECHO MEAS - AO V2 VTI: 24.9 CM
BH CV ECHO MEAS - AVA(I,D): 1.8 CM2
BH CV ECHO MEAS - EDV(CUBED): 54.1 ML
BH CV ECHO MEAS - EDV(MOD-SP2): 53.9 ML
BH CV ECHO MEAS - EDV(MOD-SP4): 49.2 ML
BH CV ECHO MEAS - EF(MOD-SP2): 46.6 %
BH CV ECHO MEAS - EF(MOD-SP4): 64.6 %
BH CV ECHO MEAS - ESV(CUBED): 20.6 ML
BH CV ECHO MEAS - ESV(MOD-SP2): 28.8 ML
BH CV ECHO MEAS - ESV(MOD-SP4): 17.4 ML
BH CV ECHO MEAS - FS: 27.5 %
BH CV ECHO MEAS - IVS/LVPW: 0.98 CM
BH CV ECHO MEAS - IVSD: 1.16 CM
BH CV ECHO MEAS - LA DIMENSION: 4 CM
BH CV ECHO MEAS - LAT PEAK E' VEL: 7.2 CM/SEC
BH CV ECHO MEAS - LV DIASTOLIC VOL/BSA (35-75): 29.5 CM2
BH CV ECHO MEAS - LV MASS(C)D: 148.1 GRAMS
BH CV ECHO MEAS - LV MAX PG: 2 MMHG
BH CV ECHO MEAS - LV MEAN PG: 1.15 MMHG
BH CV ECHO MEAS - LV SYSTOLIC VOL/BSA (12-30): 10.4 CM2
BH CV ECHO MEAS - LV V1 MAX: 70.7 CM/SEC
BH CV ECHO MEAS - LV V1 VTI: 16.5 CM
BH CV ECHO MEAS - LVIDD: 3.8 CM
BH CV ECHO MEAS - LVIDS: 2.7 CM
BH CV ECHO MEAS - LVOT AREA: 2.7 CM2
BH CV ECHO MEAS - LVOT DIAM: 1.86 CM
BH CV ECHO MEAS - LVPWD: 1.19 CM
BH CV ECHO MEAS - MED PEAK E' VEL: 5.2 CM/SEC
BH CV ECHO MEAS - MR MAX PG: 85 MMHG
BH CV ECHO MEAS - MR MAX VEL: 461.1 CM/SEC
BH CV ECHO MEAS - MV A MAX VEL: 98.5 CM/SEC
BH CV ECHO MEAS - MV DEC SLOPE: 382 CM/SEC2
BH CV ECHO MEAS - MV E MAX VEL: 60.8 CM/SEC
BH CV ECHO MEAS - MV E/A: 0.62
BH CV ECHO MEAS - MV MAX PG: 3.9 MMHG
BH CV ECHO MEAS - MV MEAN PG: 1.81 MMHG
BH CV ECHO MEAS - MV P1/2T: 45 MSEC
BH CV ECHO MEAS - MV V2 VTI: 20.1 CM
BH CV ECHO MEAS - MVA(P1/2T): 4.9 CM2
BH CV ECHO MEAS - MVA(VTI): 2.23 CM2
BH CV ECHO MEAS - PA V2 MAX: 106.4 CM/SEC
BH CV ECHO MEAS - RAP SYSTOLE: 3 MMHG
BH CV ECHO MEAS - RVDD: 2.15 CM
BH CV ECHO MEAS - RVSP: 27.1 MMHG
BH CV ECHO MEAS - SI(MOD-SP2): 15 ML/M2
BH CV ECHO MEAS - SI(MOD-SP4): 19 ML/M2
BH CV ECHO MEAS - SV(LVOT): 44.8 ML
BH CV ECHO MEAS - SV(MOD-SP2): 25.1 ML
BH CV ECHO MEAS - SV(MOD-SP4): 31.8 ML
BH CV ECHO MEAS - TAPSE (>1.6): 1.6 CM
BH CV ECHO MEAS - TR MAX PG: 24.1 MMHG
BH CV ECHO MEAS - TR MAX VEL: 245.4 CM/SEC
BH CV ECHO MEASUREMENTS AVERAGE E/E' RATIO: 9.81
BH CV ECHO SHUNT ASSESSMENT PERFORMED (HIDDEN SCRIPTING): 1
BILIRUB SERPL-MCNC: 0.5 MG/DL (ref 0–1.2)
BUN SERPL-MCNC: 9 MG/DL (ref 8–23)
BUN/CREAT SERPL: 12.7 (ref 7–25)
CALCIUM SPEC-SCNC: 9 MG/DL (ref 8.6–10.5)
CHLORIDE SERPL-SCNC: 100 MMOL/L (ref 98–107)
CHOLEST SERPL-MCNC: 142 MG/DL (ref 0–200)
CO2 SERPL-SCNC: 27 MMOL/L (ref 22–29)
CREAT SERPL-MCNC: 0.71 MG/DL (ref 0.57–1)
DEPRECATED RDW RBC AUTO: 43.3 FL (ref 37–54)
EGFRCR SERPLBLD CKD-EPI 2021: 90.5 ML/MIN/1.73
EOSINOPHIL # BLD AUTO: 0.4 10*3/MM3 (ref 0–0.4)
EOSINOPHIL NFR BLD AUTO: 5.5 % (ref 0.3–6.2)
ERYTHROCYTE [DISTWIDTH] IN BLOOD BY AUTOMATED COUNT: 14.2 % (ref 12.3–15.4)
GLOBULIN UR ELPH-MCNC: 2.6 GM/DL
GLUCOSE BLDC GLUCOMTR-MCNC: 156 MG/DL (ref 70–130)
GLUCOSE SERPL-MCNC: 119 MG/DL (ref 65–99)
HBA1C MFR BLD: 5.9 % (ref 4.8–5.6)
HCT VFR BLD AUTO: 38.9 % (ref 34–46.6)
HDLC SERPL-MCNC: 40 MG/DL (ref 40–60)
HGB BLD-MCNC: 12.6 G/DL (ref 12–15.9)
IMM GRANULOCYTES # BLD AUTO: 0.03 10*3/MM3 (ref 0–0.05)
IMM GRANULOCYTES NFR BLD AUTO: 0.4 % (ref 0–0.5)
LDLC SERPL CALC-MCNC: 78 MG/DL (ref 0–100)
LDLC/HDLC SERPL: 1.87 {RATIO}
LEFT ATRIUM VOLUME INDEX: 13.7 ML/M2
LYMPHOCYTES # BLD AUTO: 1.92 10*3/MM3 (ref 0.7–3.1)
LYMPHOCYTES NFR BLD AUTO: 26.6 % (ref 19.6–45.3)
MAGNESIUM SERPL-MCNC: 2 MG/DL (ref 1.6–2.4)
MAXIMAL PREDICTED HEART RATE: 148 BPM
MCH RBC QN AUTO: 27.4 PG (ref 26.6–33)
MCHC RBC AUTO-ENTMCNC: 32.4 G/DL (ref 31.5–35.7)
MCV RBC AUTO: 84.6 FL (ref 79–97)
MONOCYTES # BLD AUTO: 0.61 10*3/MM3 (ref 0.1–0.9)
MONOCYTES NFR BLD AUTO: 8.5 % (ref 5–12)
NEUTROPHILS NFR BLD AUTO: 4.19 10*3/MM3 (ref 1.7–7)
NEUTROPHILS NFR BLD AUTO: 58.2 % (ref 42.7–76)
NRBC BLD AUTO-RTO: 0 /100 WBC (ref 0–0.2)
PHOSPHATE SERPL-MCNC: 4.7 MG/DL (ref 2.5–4.5)
PLATELET # BLD AUTO: 178 10*3/MM3 (ref 140–450)
PMV BLD AUTO: 10 FL (ref 6–12)
POTASSIUM SERPL-SCNC: 4.1 MMOL/L (ref 3.5–5.2)
PROT SERPL-MCNC: 6.4 G/DL (ref 6–8.5)
RBC # BLD AUTO: 4.6 10*6/MM3 (ref 3.77–5.28)
SODIUM SERPL-SCNC: 137 MMOL/L (ref 136–145)
STJ: 2.1 CM
STRESS TARGET HR: 126 BPM
TRIGL SERPL-MCNC: 137 MG/DL (ref 0–150)
VLDLC SERPL-MCNC: 24 MG/DL (ref 5–40)
WBC NRBC COR # BLD: 7.21 10*3/MM3 (ref 3.4–10.8)

## 2023-01-09 PROCEDURE — 84100 ASSAY OF PHOSPHORUS: CPT | Performed by: INTERNAL MEDICINE

## 2023-01-09 PROCEDURE — 70553 MRI BRAIN STEM W/O & W/DYE: CPT

## 2023-01-09 PROCEDURE — 80061 LIPID PANEL: CPT | Performed by: INTERNAL MEDICINE

## 2023-01-09 PROCEDURE — 74176 CT ABD & PELVIS W/O CONTRAST: CPT

## 2023-01-09 PROCEDURE — 83735 ASSAY OF MAGNESIUM: CPT | Performed by: INTERNAL MEDICINE

## 2023-01-09 PROCEDURE — 25010000002 CEFTRIAXONE PER 250 MG: Performed by: INTERNAL MEDICINE

## 2023-01-09 PROCEDURE — 85025 COMPLETE CBC W/AUTO DIFF WBC: CPT | Performed by: INTERNAL MEDICINE

## 2023-01-09 PROCEDURE — 71045 X-RAY EXAM CHEST 1 VIEW: CPT

## 2023-01-09 PROCEDURE — 25010000002 PERFLUTREN (DEFINITY) 8.476 MG IN SODIUM CHLORIDE (PF) 0.9 % 10 ML INJECTION: Performed by: INTERNAL MEDICINE

## 2023-01-09 PROCEDURE — 97162 PT EVAL MOD COMPLEX 30 MIN: CPT

## 2023-01-09 PROCEDURE — 99232 SBSQ HOSP IP/OBS MODERATE 35: CPT | Performed by: NURSE PRACTITIONER

## 2023-01-09 PROCEDURE — 83036 HEMOGLOBIN GLYCOSYLATED A1C: CPT | Performed by: INTERNAL MEDICINE

## 2023-01-09 PROCEDURE — 82962 GLUCOSE BLOOD TEST: CPT

## 2023-01-09 PROCEDURE — 25010000002 GADOTERIDOL PER 1 ML: Performed by: INTERNAL MEDICINE

## 2023-01-09 PROCEDURE — 92523 SPEECH SOUND LANG COMPREHEN: CPT | Performed by: SPEECH-LANGUAGE PATHOLOGIST

## 2023-01-09 PROCEDURE — 93306 TTE W/DOPPLER COMPLETE: CPT

## 2023-01-09 PROCEDURE — 97166 OT EVAL MOD COMPLEX 45 MIN: CPT

## 2023-01-09 PROCEDURE — 80053 COMPREHEN METABOLIC PANEL: CPT | Performed by: INTERNAL MEDICINE

## 2023-01-09 PROCEDURE — A9579 GAD-BASE MR CONTRAST NOS,1ML: HCPCS | Performed by: INTERNAL MEDICINE

## 2023-01-09 RX ADMIN — GADOTERIDOL 17 ML: 279.3 INJECTION, SOLUTION INTRAVENOUS at 09:41

## 2023-01-09 RX ADMIN — Medication 10 ML: at 20:34

## 2023-01-09 RX ADMIN — ESCITALOPRAM OXALATE 20 MG: 10 TABLET ORAL at 20:29

## 2023-01-09 RX ADMIN — Medication 10 ML: at 08:29

## 2023-01-09 RX ADMIN — Medication 10 ML: at 20:33

## 2023-01-09 RX ADMIN — GABAPENTIN 300 MG: 300 CAPSULE ORAL at 21:29

## 2023-01-09 RX ADMIN — ATORVASTATIN CALCIUM 80 MG: 40 TABLET, FILM COATED ORAL at 20:29

## 2023-01-09 RX ADMIN — CLOPIDOGREL BISULFATE 75 MG: 75 TABLET ORAL at 08:08

## 2023-01-09 RX ADMIN — HYDROCODONE BITARTRATE AND ACETAMINOPHEN 1 TABLET: 5; 325 TABLET ORAL at 20:30

## 2023-01-09 RX ADMIN — GABAPENTIN 300 MG: 300 CAPSULE ORAL at 14:34

## 2023-01-09 RX ADMIN — BUPROPION HYDROCHLORIDE 150 MG: 150 TABLET, EXTENDED RELEASE ORAL at 08:08

## 2023-01-09 RX ADMIN — HYDROCODONE BITARTRATE AND ACETAMINOPHEN 1 TABLET: 5; 325 TABLET ORAL at 11:20

## 2023-01-09 RX ADMIN — PERFLUTREN 4 ML: 6.52 INJECTION, SUSPENSION INTRAVENOUS at 12:23

## 2023-01-09 RX ADMIN — DIAZEPAM 5 MG: 5 TABLET ORAL at 23:31

## 2023-01-09 RX ADMIN — ASPIRIN 81 MG: 81 TABLET, FILM COATED ORAL at 08:08

## 2023-01-09 RX ADMIN — CEFTRIAXONE SODIUM 1 G: 1 INJECTION, POWDER, FOR SOLUTION INTRAMUSCULAR; INTRAVENOUS at 14:33

## 2023-01-09 RX ADMIN — GABAPENTIN 300 MG: 300 CAPSULE ORAL at 05:45

## 2023-01-09 RX ADMIN — PANTOPRAZOLE SODIUM 40 MG: 40 TABLET, DELAYED RELEASE ORAL at 06:52

## 2023-01-09 RX ADMIN — DIAZEPAM 5 MG: 5 TABLET ORAL at 08:27

## 2023-01-09 NOTE — THERAPY EVALUATION
Acute Care - Speech Language Pathology Initial Evaluation  [facility]     Patient Name: Ave Ramirez  : 1950  MRN: [id number]  Today's Date: 2023               Admit Date: 2023     Visit Dx:    ICD-10-CM ICD-9-CM   1. Dysarthria  R47.1 784.51   2. Urinary tract infection with hematuria, site unspecified  N39.0 599.0    R31.9 599.70   3. Cerebrovascular accident (CVA) due to thrombosis of left middle cerebral artery (HCC)  I63.312 434.01   4. Other cerebrovascular vasospasm and vasoconstriction  I67.848 437.8   5. Impaired mobility and ADLs  Z74.09 V49.89    Z78.9      Patient Active Problem List   Diagnosis   • Hypothyroidism   • Atherosclerotic heart disease of native coronary artery without angina pectoris   • PVD (peripheral vascular disease) ([facility])   • Dyspnea on exertion   • CAD (coronary artery disease)   • Essential hypertension   • Palpitations   • Dizziness   • CVA (cerebral vascular accident) ([facility])   • Dysarthria     Past Medical History:   Diagnosis Date   • Acute angina ([facility])    • Acute bronchitis    • Acute sinusitis    • Anxiety    • Atherosclerotic heart disease of native coronary artery without angina pectoris    • Borderline glaucoma    • CAD (coronary artery disease)    • Cough    • Depression    • Disease of gallbladder     s/p lap jocelyne and normal ioc      • Encounter for gynecological examination (general) (routine) without abnormal findings    • Encounter for screening mammogram for malignant neoplasm of breast    • History of echocardiogram 2014    Normal LV systolic funciton with Ef of 55% with diastolic relaxation abnormality of the left ventricle. Mild mitral regurgitation.   • Hyperlipidemia    • Hypertension    • Hypothyroidism    • Keratoconjunctivitis sicca ([facility])    • Myopia     refractive change OD    • Nausea and vomiting    • Nuclear cataract    • PVD (peripheral vascular disease) ([facility])    • SOB (shortness of breath)    • Upper respiratory infection       Past Surgical History:   Procedure Laterality Date   • CARDIAC CATHETERIZATION  2014    Medically manageable coronary artery disease in the previously placed stent in the diagonal coronary artery was patent with liminal irregularities. Normal LV systolic function with no wall motion abnormalities   • CARDIAC CATHETERIZATION     •  SECTION     • CHOLECYSTECTOMY  2012    intraoperative cholangiogram. Cholecystitis & cholelithiasis   • CORONARY STENT PLACEMENT      [facility]   • MAMMO BILATERAL  2016    DIAG MAMM, BILAT DIGITAL  (Medicare) (Other abnormal and inconclusive findings on diagnostic imaging of breast)    • MAMMO BILATERAL  2016    SCREENING MAMMOGRAPHY DIGITAL  (Medicare) (Encounter for screening mammogram for malignant neoplasm of breast)    • OTHER SURGICAL HISTORY  2003    OCT DISC NFL 70263 (Borderline glaucoma)    • TONSILLECTOMY         SLP Recommendation and Plan                                                     Progress: improving (23)  Outcome Evaluation: Speech evaluation: Pt with dysarthria and expressive aphasia. Pt with decreased speech intelligiblity this date at the conversational level. SLP focused on education of over-articulation, articulation of initial sound, and short phrases. Pt frustrated at speech deficits and attempts to communicate with  and SLP. SLP provided picture communication board and alphabet board to assist in functional communication. Pt to benefit from skilled ST to address dysarthria and  expressive language deficits. (23 152)      SLP EVALUATION (last 72 hours)     SLP SLC Evaluation     Row Name 23 1115                   Communication Assessment/Intervention    Document Type evaluation  1015  -EK        Subjective Information no complaints  -EK        Patient Observations alert;cooperative;agree to therapy  -EK        Patient/Family/Caregiver Comments/Observations   -EK         Patient Effort good  -EK           General Information    Patient Profile Reviewed yes  -EK        Precautions/Limitations, Vision WFL with corrective lenses  -EK        Precautions/Limitations, Hearing WFL  -EK        Prior Level of Function-Communication WFL  -EK        Plans/Goals Discussed with spouse/S.O.;patient  -EK           Pain    Additional Documentation Pain Scale: Numbers Pre/Post-Treatment (Group)  -EK           Pain Scale: Numbers Pre/Post-Treatment    Pretreatment Pain Rating 0/10 - no pain  -EK        Posttreatment Pain Rating 0/10 - no pain  -EK           Comprehension Assessment/Intervention    Comprehension Assessment/Intervention Auditory Comprehension  -EK           Auditory Comprehension Assessment/Intervention    Auditory Comprehension (Communication) WFL  -EK        Able to Identify Objects/Pictures (Communication) WFL  -EK        Answers Questions (Communication) WFL  -EK        Able to Follow Commands (Communication) WFL  -EK           Expression Assessment/Intervention    Expression Assessment/Intervention verbal expression  -EK           Verbal Expression Assessment/Intervention    Automatic Speech (Communication) moderate impairment  -EK        Repetition moderate impairment  -EK        Conversational Discourse/Fluency moderate impairment  -EK           Oral Motor Structure and Function    Dentition Assessment upper dentures/partial in place;lower dentures/partial in place  -EK           Oral Musculature and Cranial Nerve Assessment    Oral Motor General Assessment generalized oral motor weakness  -EK           Motor Speech Assessment/Intervention    Motor Speech Function moderate impairment;severe impairment  -EK        Characteristics Consistent with Dysarthria slurred speech;decreased intensity  -EK        Initiation of Phonation (Communication) moderate impairment  -EK        Automatic Speech (Communication) moderate impairment  -EK        Verbal Repetition (Communication) moderate  impairment  -EK           SLP Treatment Clinical Impressions    Care Plan Review evaluation/treatment results reviewed  -EK           Word Retrieval Skills Goal 1 (SLP)    Improve Word Retrieval Skills By Goal 1 (SLP) completing automatic speech task, counting;completing automatic speech task, alphabet;90%;with minimal cues (75-90%)  -EK        Time Frame (Word Retrieval Goal 1, SLP) by discharge  -EK        Progress/Outcomes (Word Retrieval Goal 1, SLP) new goal  -EK           Word Retrieval Skills Goal 2 (SLP)    Improve Word Retrieval Skills By Goal 2 (SLP) repeating words;90%;with minimal cues (75-90%)  -EK        Time Frame (Word Retrieval Goal 2, SLP) by discharge  -EK        Progress/Outcomes (Word Retrieval Goal 2, SLP) new goal  -EK           Patient Will Implement Strategies Goal 1 (SLP)    Implement Strategies of Goal 1 (SLP) 90%;with minimal cues (75-90%);pointing to pictures  -EK        Time Frame (Strategy Implementation Goal 1, SLP) by discharge  -EK        Progress/Outcomes (Strategy Implementation Goal 1, SLP) new goal  -EK           Patient Will Implement Strategies Goal 2 (SLP)    Implement Strategies of Goal 2 (SLP) using alphabet/word board;90%;with minimal cues (75-90%)  -EK        Time Frame (Strategy Implementation Goal 2, SLP) by discharge  -EK        Progress/Outcomes (Strategy Implementation Goal 2, SLP) new goal  -EK           Articulation Goal 1 (SLP)    Improve Articulation Goal 1 (SLP) by over-articulating at phrase level;90%;with minimal cues (75-90%)  -EK        Time Frame (Articulation Goal 1, SLP) by discharge  -EK        Progress/Outcomes (Articulation Goal 1, SLP) new goal  -EK           Articulation Goal 2 (SLP)    Improve Articulation Goal 2 (SLP) of specific sounds in connected speech;by over-articulating at word level;90%;with minimal cues (75-90%)  -EK        Time Frame (Articulation Goal 2, SLP) by discharge  -EK        Progress/Outcomes (Articulation Goal 2, SLP) new goal   -EK              User Key  (r) = Recorded By, (t) = Taken By, (c) = Cosigned By    Initials Name Effective Dates    EK May Solorzano, CCC-SLP 06/16/21 -                Swallowing screened.  No deficits noted.     EDUCATION  The patient has been educated in the following areas:     Cognitive Impairment Communication Impairment.           SLP GOALS     Row Name 01/09/23 1115             Word Retrieval Skills Goal 1 (SLP)    Improve Word Retrieval Skills By Goal 1 (SLP) completing automatic speech task, counting;completing automatic speech task, alphabet;90%;with minimal cues (75-90%)  -EK      Time Frame (Word Retrieval Goal 1, SLP) by discharge  -EK      Progress/Outcomes (Word Retrieval Goal 1, SLP) new goal  -EK         Word Retrieval Skills Goal 2 (SLP)    Improve Word Retrieval Skills By Goal 2 (SLP) repeating words;90%;with minimal cues (75-90%)  -EK      Time Frame (Word Retrieval Goal 2, SLP) by discharge  -EK      Progress/Outcomes (Word Retrieval Goal 2, SLP) new goal  -EK         Patient Will Implement Strategies Goal 1 (SLP)    Implement Strategies of Goal 1 (SLP) 90%;with minimal cues (75-90%);pointing to pictures  -EK      Time Frame (Strategy Implementation Goal 1, SLP) by discharge  -EK      Progress/Outcomes (Strategy Implementation Goal 1, SLP) new goal  -EK         Patient Will Implement Strategies Goal 2 (SLP)    Implement Strategies of Goal 2 (SLP) using alphabet/word board;90%;with minimal cues (75-90%)  -EK      Time Frame (Strategy Implementation Goal 2, SLP) by discharge  -EK      Progress/Outcomes (Strategy Implementation Goal 2, SLP) new goal  -EK         Articulation Goal 1 (SLP)    Improve Articulation Goal 1 (SLP) by over-articulating at phrase level;90%;with minimal cues (75-90%)  -EK      Time Frame (Articulation Goal 1, SLP) by discharge  -EK      Progress/Outcomes (Articulation Goal 1, SLP) new goal  -EK         Articulation Goal 2 (SLP)    Improve Articulation Goal 2  (SLP) of specific sounds in connected speech;by over-articulating at word level;90%;with minimal cues (75-90%)  -EK      Time Frame (Articulation Goal 2, SLP) by discharge  -EK      Progress/Outcomes (Articulation Goal 2, SLP) new goal  -EK            User Key  (r) = Recorded By, (t) = Taken By, (c) = Cosigned By    Initials Name Provider Type    May Vital CCC-SLP Speech and Language Pathologist                        Time Calculation:      Time Calculation- SLP     Row Name 01/09/23 1541             Time Calculation- SLP    SLP Start Time 1015  -EK      SLP Stop Time 1056  -EK      SLP Time Calculation (min) 41 min  -EK      Total Timed Code Minutes- SLP 41 minute(s)  -EK            User Key  (r) = Recorded By, (t) = Taken By, (c) = Cosigned By    Initials Name Provider Type    May Vital CCC-SLP Speech and Language Pathologist                Therapy Charges for Today     Code Description Service Date Service Provider Modifiers Qty    [id number]  ST EVAL SPEECH AND PROD W LANG  3 1/9/2023 May Solorzano CCC-SLP GN 1                     CRISTIANE Lee  1/9/2023

## 2023-01-09 NOTE — PLAN OF CARE
Goal Outcome Evaluation:  Plan of Care Reviewed With: patient, spouse           Outcome Evaluation: PT Waldemar w/ OT as co waldemar. Pt has been mobility indep w/ SPC at home per report and able to walk across/thru rooms in home. She uses an antique high bed to get in/out of bed which helps her for sup to sit/stand but appears she says it is harder to get back into it.  She demonstrated sit to stand and gait to/from toilet w/ SPC but CGA x 1 given for gait. She can move sit<>;stand from toilet w/ SBA .  Pt had walked to toilet by herself prior to our entry per spouse but they are cautioned to have help for her when up in room. Pt deferred further gait than in room this visit reporting she was tired. She is very frustrated w/ difficuity in communication and spouse indicates she is very indep and wants to be as indep as possible. 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 THey plan d/c home w/spouse but PT will follow to help incresae ex jeremy and gait distance jeremy as she is able.

## 2023-01-09 NOTE — PLAN OF CARE
Goal Outcome Evaluation:  Plan of Care Reviewed With: patient, spouse           Outcome Evaluation: initial OT eval completed. Co-eval with PT. Upon arrival, spouse reported she had just gone to the bathroom and used the restroom on her own. Pt was breathing heavily. Pt reported she is (I) with all ADLs and assists with many IADLs. Pt demonstrates expressive aphasia and dysarthria. Pt demonstrated 4-/5 strength on LUE. Demonstrated 3+/5 strength for R elbow and . 2+/5 strength for R shoudler flexion. R shoudler flexion of 20 degrees with all other joints WFL. Pt reports oversensitivity, numbness, and tingling with entire R side of body. Pt completed sup <> sit with CGA with use of bed rails. Pt completed LB dressing of slippers with cues required to push feet all the way into the shoes. Pt completed sit <> stand with CGA. Completed mobility to and from the bathroom with CGA and SPC. CGA for toilet t/f. Pt became SOA and was breathing heavily during mobility. Pt frequently became frustrated during eval when therapists and spouse did not understand her wants/needs. I/P OT will follow d/t pt demonstrating decreased safety, endurance, and (I) with ADLs and mobility. Goals established.

## 2023-01-09 NOTE — THERAPY EVALUATION
Patient Name: Ave Ramirez  : 1950    MRN: [id number]                              Today's Date: 2023     PT Evaluation  Admit Date: 2023    Visit Dx:     ICD-10-CM ICD-9-CM   1. Dysarthria  R47.1 784.51   2. Urinary tract infection with hematuria, site unspecified  N39.0 599.0    R31.9 599.70   3. Cerebrovascular accident (CVA) due to thrombosis of left middle cerebral artery (HCC)  I63.312 434.01   4. Other cerebrovascular vasospasm and vasoconstriction  I67.848 437.8   5. Impaired mobility and ADLs  Z74.09 V49.89    Z78.9    6. Impaired functional mobility, balance, gait, and endurance  Z74.09 V49.89     Patient Active Problem List   Diagnosis   • Hypothyroidism   • Atherosclerotic heart disease of native coronary artery without angina pectoris   • PVD (peripheral vascular disease) ([facility])   • Dyspnea on exertion   • CAD (coronary artery disease)   • Essential hypertension   • Palpitations   • Dizziness   • CVA (cerebral vascular accident) ([facility])   • Dysarthria     Past Medical History:   Diagnosis Date   • Acute angina ([facility])    • Acute bronchitis    • Acute sinusitis    • Anxiety    • Atherosclerotic heart disease of native coronary artery without angina pectoris    • Borderline glaucoma    • CAD (coronary artery disease)    • Cough    • Depression    • Disease of gallbladder     s/p lap jocelyne and normal ioc      • Encounter for gynecological examination (general) (routine) without abnormal findings    • Encounter for screening mammogram for malignant neoplasm of breast    • History of echocardiogram 2014    Normal LV systolic funciton with Ef of 55% with diastolic relaxation abnormality of the left ventricle. Mild mitral regurgitation.   • Hyperlipidemia    • Hypertension    • Hypothyroidism    • Keratoconjunctivitis sicca ([facility])    • Myopia     refractive change OD    • Nausea and vomiting    • Nuclear cataract    • PVD (peripheral vascular disease) ([facility])    • SOB (shortness of breath)     • Upper respiratory infection      Past Surgical History:   Procedure Laterality Date   • CARDIAC CATHETERIZATION  2014    Medically manageable coronary artery disease in the previously placed stent in the diagonal coronary artery was patent with liminal irregularities. Normal LV systolic function with no wall motion abnormalities   • CARDIAC CATHETERIZATION     •  SECTION     • CHOLECYSTECTOMY  2012    intraoperative cholangiogram. Cholecystitis & cholelithiasis   • CORONARY STENT PLACEMENT      [facility]   • MAMMO BILATERAL  2016    DIAG MAMM, BILAT DIGITAL  (Medicare) (Other abnormal and inconclusive findings on diagnostic imaging of breast)    • MAMMO BILATERAL  2016    SCREENING MAMMOGRAPHY DIGITAL  (Medicare) (Encounter for screening mammogram for malignant neoplasm of breast)    • OTHER SURGICAL HISTORY  2003    OCT DISC NFL 04099 (Borderline glaucoma)    • TONSILLECTOMY        General Information     Row Name 23 1403          Physical Therapy Time and Intention    Document Type evaluation  -GB     Mode of Treatment co-treatment;physical therapy;occupational therapy  -GB     Row Name 23 1400          General Information    Patient Profile Reviewed yes  -GB     Prior Level of Function independent:;all household mobility;gait;bathing;grooming;dressing;feeding;dependent:;driving  except bra sometimes; indep w/ socks on/off w/out AD  -GB     Existing Precautions/Restrictions fall  expressive aphasia is frustrating for her; some fall risk  -GB     Barriers to Rehab medically complex;previous functional deficit  -GB     Row Name 23 1404          Living Environment    People in Home spouse;child(hardik), adult  -GB     Row Name 23 1404          Home Main Entrance    Number of Stairs, Main Entrance two  -GB     Row Name 23 1405          Stairs Within Home, Primary    Stairs, Within Home, Primary uses SPC; tub shower w/ seat in shower w/  clamp inside tub if needed; toilet regular; no bars;  -GB     Number of Stairs, Within Home, Primary none  -     Row Name 01/09/23 1405          Cognition    Orientation Status (Cognition) person;place;situation;oriented to  -     Row Name 01/09/23 1405          Safety Issues, Functional Mobility    Impairments Affecting Function (Mobility) balance;endurance/activity tolerance;shortness of breath;strength  -           User Key  (r) = Recorded By, (t) = Taken By, (c) = Cosigned By    Initials Name Provider Type    Violeta Thomason, ELLIE Physical Therapist               Mobility     Row Name 01/09/23 1457          Bed Mobility    Bed Mobility bed mobility (all) activities  -     All Activities, [place] (Bed Mobility) minimum assist (75% patient effort);1 person assist  -     Assistive Device (Bed Mobility) bed rails;head of bed elevated;draw sheet  -     Row Name 01/09/23 1457          Bed-Chair Transfer    Bed-Chair [place] (Transfers) contact guard;minimum assist (75% patient effort);1 person assist;1 person to manage equipment  -     Assistive Device (Bed-Chair Transfers) cane, straight  -     Row Name 01/09/23 1457          Sit-Stand Transfer    Sit-Stand [place] (Transfers) standby assist;contact guard;1 person assist  -     Assistive Device (Sit-Stand Transfers) cane, straight  -     Row Name 01/09/23 1457          Gait/Stairs (Locomotion)    [place] Level (Gait) minimum assist (75% patient effort);1 person assist;1 person to manage equipment  -     Assistive Device (Gait) cane, straight  -GB     Distance in Feet (Gait) 8 x 2  -GB     Deviations/Abnormal Patterns (Gait) [place] decreased;gait speed decreased;stride length decreased  -GB     Right Sided Gait Deviations heel strike decreased  -GB           User Key  (r) = Recorded By, (t) = Taken By, (c) = Cosigned By    Initials Name Provider Type    Violeta Thomason PT Physical Therapist                Obj/Interventions     Row Name 01/09/23 1725          Range of Motion Comprehensive    General Range of Motion bilateral lower extremity ROM WFL  -GB     Row Name 01/09/23 1725          Strength Comprehensive (MMT)    Comment, General Manual Muscle Testing (MMT) Assessment LLE is grossly 4-4-/5 flx/ext hip knees ankles. She can DF noah ankles in supine w/ LEs extended.  RLE difficult to get true MMT due to extensor tone influencing her control, needing P-AAROM to attain hooklying position in supine, but she was able to flex LEs up fo sup to sit and sit to sup.  -GB     Row Name 01/09/23 1725          Sensory Assessment (Somatosensory)    Sensory Assessment (Somatosensory) LE sensation intact  -GB           User Key  (r) = Recorded By, (t) = Taken By, (c) = Cosigned By    Initials Name Provider Type    Violeta Thomason, PT Physical Therapist               Goals/Plan     Row Name 01/09/23 1415          Bed Mobility Goal 1 (PT)    Activity/Assistive Device (Bed Mobility Goal 1, PT) bed mobility activities, all  -GB     [place] [address] Level/Cues Needed (Bed Mobility Goal 1, PT) modified independence;independent  -GB     Time Frame (Bed Mobility Goal 1, PT) by discharge  -GB     Progress/Outcomes (Bed Mobility Goal 1, PT) goal ongoing  -GB     Row Name 01/09/23 1415          Transfer Goal 1 (PT)    Activity/Assistive Device (Transfer Goal 1, PT) bed-to-chair/chair-to-bed;toilet  -GB     [address] Level/Cues Needed (Transfer Goal 1, PT) modified independence  -GB     Time Frame (Transfer Goal 1, PT) by discharge  -GB     Progress/Outcome (Transfer Goal 1, PT) goal ongoing  -GB     Row Name 01/09/23 1415          Gait Training Goal 1 (PT)    Activity/Assistive Device (Gait Training Goal 1, PT) gait (walking locomotion);assistive device use  -GB     [address] Level (Gait Training Goal 1, PT) modified independence  -GB     Distance (Gait Training Goal 1, PT) 100 ft or more per trip w/ VSS and no SOA  -GB      Time Frame (Gait Training Goal 1, PT) by discharge  -GB     Progress/Outcome (Gait Training Goal 1, PT) goal not met  -GB     Row Name 01/09/23 1415          Stairs Goal 1 (PT)    Activity/Assistive Device (Stairs Goal 1, PT) ascending stairs;descending stairs  -GB     [place] Level/Cues Needed (Stairs Goal 1, PT) modified independence;supervision required  -GB     Number of Stairs (Stairs Goal 1, PT) 2  -GB     Time Frame (Stairs Goal 1, PT) by discharge  -GB     Progress/Outcome (Stairs Goal 1, PT) new goal  -GB     Row Name 01/09/23 1415          Problem Specific Goal 1 (PT)    Problem Specific Goal 1 (PT) pt will demo indep sit to stand x 3 as ther ex w/out LOB or SOA  -GB     Time Frame (Problem Specific Goal 1, PT) 1 week  -GB     Progress/Outcome (Problem Specific Goal 1, PT) goal not met  -GB     Row Name 01/09/23 1415          Therapy Assessment/Plan (PT)    Planned Therapy Interventions (PT) balance training;bed mobility training;gait training;transfer training;neuromuscular re-education;ROM (range of motion);stair training;strengthening;patient/family education;home exercise program  -           User Key  (r) = Recorded By, (t) = Taken By, (c) = Cosigned By    Initials Name Provider Type    Violeta Thomason, PT Physical Therapist               Clinical Impression     Row Name 01/09/23 1413          Pain    Pretreatment Pain Rating 0/10 - no pain  -GB     Pain Location - Side/Orientation Right  -GB     Pain Location - flank;shoulder;hip  pina all R side per pt  -GB     Pre/Posttreatment Pain Comment tingling R side; states her entire R side is sore/has pain since she had her CVA  -GB     Pain Intervention(s) Repositioned;Ambulation/increased activity  -GB     Row Name 01/09/23 1413          Plan of Care Review    Plan of Care Reviewed With patient;spouse  -     Outcome Evaluation PT Eval w/ OT as co eval. Pt has been mobility indep w/ SPC at home per report and able to walk  across/thru rooms in home. She uses an antique high bed to get in/out of bed which helps her for sup to sit/stand but appears she says it is harder to get back into it.  She demonstrated sit to stand and gait to/from toilet w/ SPC but CGA x 1 given for gait. She can move sit<>;stand from toilet w/ SBA .  Pt had walked to toilet by herself prior to our entry per spouse but they are cautioned to have help for her when up in room. Pt deferred further gait than in room this visit reporting she was tired. She is very frustrated w/ difficuity in communication and spouse indicates she is very indep and wants to be as indep as possible. 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 THey plan d/c home w/spouse but PT will follow to help incresae ex jeremy and gait distance jeremy as she is able.  -GB     Row Name 01/09/23 1414          Therapy Assessment/Plan (PT)    Patient/Family Therapy Goals Statement (PT) home; will consider HH PT/OT  -GB     Rehab Potential (PT) good, to achieve stated therapy goals  -GB     Criteria for Skilled Interventions Met (PT) yes  -GB     Therapy Frequency (PT) other (see comments)  5-7 d/w  -GB     Predicted Duration of Therapy Intervention (PT) till goals met or transfer to next level of care  -GB     Row Name 01/09/23 1413          Vital Signs    Pre Systolic BP Rehab 145  -GB     Pre Treatment Diastolic BP 90  -GB     Intra Systolic BP Rehab 165  -GB     Intra Treatment Diastolic BP 88  -GB     Pretreatment Heart Rate (beats/min) 91  -GB     Intratreatment Heart Rate (beats/min) 82  -GB     Pre SpO2 (%) 91  -GB     O2 Delivery Pre Treatment room air  -GB     Intra SpO2 (%) 93  -GB     O2 Delivery Intra Treatment room air  -GB     O2 Delivery Post Treatment room air  -GB     Pre Patient Position Supine  -GB     Intra Patient Position Sitting  post gait from toilet where she demonstrated & reported SOA w/ exertion  -GB     Row Name 01/09/23 6419           Positioning and Restraints    Pre-Treatment Position in bed  -GB     Post Treatment Position bed  -GB     In Bed supine;fowlers;call light within reach;encouraged to call for assist;exit alarm on;side rails up x2;heels elevated;with family/caregiver  -GB           User Key  (r) = Recorded By, (t) = Taken By, (c) = Cosigned By    Initials Name Provider Type    Violeta Thomason, PT Physical Therapist               Outcome Measures     Row Name 01/09/23 1740 01/09/23 1458       How much help from another person do you currently need...    Turning from your back to your side while in flat bed without using bedrails? 3  -GB 3  -MS    Moving from lying on back to sitting on the side of a flat bed without bedrails? 3  -GB 2  -MS    Moving to and from a bed to a chair (including a wheelchair)? 3  -GB 2  -MS    Standing up from a chair using your arms (e.g., wheelchair, bedside chair)? 3  -GB 1  -MS    Climbing 3-5 steps with a railing? 1  -GB 1  -MS    To walk in hospital room? 2  -GB 1  -MS    AM-PAC 6 Clicks Score (PT) 15  -GB 10  -MS    Highest level of mobility 4 --> Transferred to chair/commode  -GB 4 --> Transferred to chair/commode  -MS    Row Name 01/09/23 1100          How much help from another person do you currently need...    Turning from your back to your side while in flat bed without using bedrails? 3  -PC     Moving from lying on back to sitting on the side of a flat bed without bedrails? 2  -PC     Moving to and from a bed to a chair (including a wheelchair)? 2  -PC     Standing up from a chair using your arms (e.g., wheelchair, bedside chair)? 1  -PC     Climbing 3-5 steps with a railing? 1  -PC     To walk in hospital room? 1  -PC     AM-PAC 6 Clicks Score (PT) 10  -PC     Highest level of mobility 4 --> Transferred to chair/commode  -PC     Row Name 01/09/23 1740          Modified [place] Scale    Pre-Stroke Modified [place] Scale 4 - Moderately severe disability.  Unable to walk without  assistance, and unable to attend to own bodily needs without assistance.  score determined as can amb w/ SBA-CGA but w/ IV and recent fatigue rec assist for safety this isabella  -           User Key  (r) = Recorded By, (t) = Taken By, (c) = Cosigned By    Initials Name Provider Type    Violeta Thomason, PT Physical Therapist    Nalini Esparza, RN Registered Nurse    Avril Jennings RN Registered Nurse                             Physical Therapy Education     Title: PT OT SLP Therapies (In Progress)     Topic: Physical Therapy (In Progress)     Point: Mobility training (Done)     Learning Progress Summary           Patient Acceptance, E,D, VU,NR by  at 1/9/2023 1417    Comment: POC   Family Acceptance, E,D, VU,NR by  at 1/9/2023 1417    Comment: POC                   Point: Home exercise program (Not Started)     Learner Progress:  Not documented in this visit.          Point: Body mechanics (Not Started)     Learner Progress:  Not documented in this visit.          Point: Precautions (Done)     Learning Progress Summary           Patient Acceptance, E,D, VU,NR by  at 1/9/2023 1417    Comment: POC   Family Acceptance, E,D, VU,NR by  at 1/9/2023 1417    Comment: POC                               User Key     Initials Effective Dates Name Provider Type Discipline     06/16/21 -  Violeta Su PT Physical Therapist PT              PT Recommendation and Plan  Planned Therapy Interventions (PT): balance training, bed mobility training, gait training, transfer training, neuromuscular re-education, ROM (range of motion), stair training, strengthening, patient/family education, home exercise program  Plan of Care Reviewed With: patient, spouse  Outcome Evaluation: PT Eval w/ OT as co eval. Pt has been mobility indep w/ SPC at home per report and able to walk across/thru rooms in home. She uses an antique high bed to get in/out of bed which helps her for sup to sit/stand but  appears she says it is harder to get back into it.  She demonstrated sit to stand and gait to/from toilet w/ SPC but CGA x 1 given for gait. She can move sit<>;stand from toilet w/ SBA .  Pt had walked to toilet by herself prior to our entry per spouse but they are cautioned to have help for her when up in room. Pt deferred further gait than in room this visit reporting she was tired. She is very frustrated w/ difficuity in communication and spouse indicates she is very indep and wants to be as indep as possible. 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 THey plan d/c home w/spouse but PT will follow to help incresae ex jeremy and gait distance jeremy as she is able.     Time Calculation:    PT Charges     Row Name 01/09/23 1405             Time Calculation    Start Time 1405  -GB      Stop Time 1459  -GB      Time Calculation (min) 54 min  -GB      PT Received On 01/09/23  -GB      PT Goal Re-Cert Due Date 01/22/23  -GB         Untimed Charges    PT Eval/Re-eval Minutes 54  -GB         Total Minutes    Untimed Charges Total Minutes 54  -GB       Total Minutes 54  -GB            User Key  (r) = Recorded By, (t) = Taken By, (c) = Cosigned By    Initials Name Provider Type    Violeta Thomason, PT Physical Therapist              Therapy Charges for Today     Code Description Service Date Service Provider Modifiers Qty    84945940468 HC PT EVAL MOD COMPLEXITY 4 1/9/2023 Violeta Su, PT GP 1    08885891883 HC PT THER SUPP EA 15 MIN 1/9/2023 Violeta Su, PT GP 1          PT G-Codes  AM-PAC 6 Clicks Score (PT): 15  PT Discharge Summary  Anticipated Discharge Disposition (PT): home with 24/7 care, home with home health, home with outpatient therapy services (pt may benefit from suspended gait therapy at outpt services)    Violeta Su, ELLIE  1/9/2023

## 2023-01-09 NOTE — NURSING NOTE
Spoke with Dr. Ang regarding patient's negative MRI and neurology signing off; order to transfer patient to med surg.

## 2023-01-09 NOTE — PLAN OF CARE
Problem: Fall Injury Risk  Goal: Absence of Fall and Fall-Related Injury  Intervention: Identify and Manage Contributors  Recent Flowsheet Documentation  Taken 1/8/2023 2000 by Mel Rodas RN  Medication Review/Management: medications reviewed  Self-Care Promotion: independence encouraged  Intervention: Promote Injury-Free Environment  Recent Flowsheet Documentation  Taken 1/9/2023 0400 by Mel Rodas RN  Safety Promotion/Fall Prevention: safety round/check completed  Taken 1/9/2023 0300 by Mel Rodas RN  Safety Promotion/Fall Prevention: safety round/check completed  Taken 1/9/2023 0200 by Mel Rodas RN  Safety Promotion/Fall Prevention: safety round/check completed  Taken 1/9/2023 0100 by Mel Rodas RN  Safety Promotion/Fall Prevention: safety round/check completed  Taken 1/9/2023 0000 by Mel Rodas RN  Safety Promotion/Fall Prevention: safety round/check completed  Taken 1/8/2023 2300 by Mel Rodas RN  Safety Promotion/Fall Prevention: safety round/check completed  Taken 1/8/2023 2200 by Mel Rodas RN  Safety Promotion/Fall Prevention: safety round/check completed  Taken 1/8/2023 2100 by Mel Rodas RN  Safety Promotion/Fall Prevention: safety round/check completed  Taken 1/8/2023 2000 by Mel Rodas RN  Safety Promotion/Fall Prevention: safety round/check completed  Taken 1/8/2023 1900 by Mel Rodas RN  Safety Promotion/Fall Prevention: safety round/check completed     Problem: Hypertension Comorbidity  Goal: Blood Pressure in Desired Range  Intervention: Maintain Blood Pressure Management  Recent Flowsheet Documentation  Taken 1/8/2023 2000 by Mel Rodas RN  Medication Review/Management: medications reviewed     Problem: Skin Injury Risk Increased  Goal: Skin Health and Integrity  Intervention: Optimize Skin Protection  Recent Flowsheet Documentation  Taken 1/9/2023 0000 by Mel Rodas RN  Head of Bed (HOB) Positioning: HOB at 20-30 degrees  Taken 1/8/2023 2000 by Adrianna  RIKA Leal  Pressure Reduction Techniques: frequent weight shift encouraged  Head of Bed (HOB) Positioning: HOB at 20-30 degrees  Pressure Reduction Devices:   specialty bed utilized   positioning supports utilized  Skin Protection: adhesive use limited     Problem: Adult Inpatient Plan of Care  Goal: Plan of Care Review  Flowsheets (Taken 1/9/2023 0615)  Plan of Care Reviewed With: patient  Goal: Absence of Hospital-Acquired Illness or Injury  Intervention: Identify and Manage Fall Risk  Recent Flowsheet Documentation  Taken 1/9/2023 0400 by Mel Rodas RN  Safety Promotion/Fall Prevention: safety round/check completed  Taken 1/9/2023 0300 by Mel Rodas RN  Safety Promotion/Fall Prevention: safety round/check completed  Taken 1/9/2023 0200 by Mel Rodas RN  Safety Promotion/Fall Prevention: safety round/check completed  Taken 1/9/2023 0100 by Mel Rodas RN  Safety Promotion/Fall Prevention: safety round/check completed  Taken 1/9/2023 0000 by Mel Rodas RN  Safety Promotion/Fall Prevention: safety round/check completed  Taken 1/8/2023 2300 by Mel Rodas RN  Safety Promotion/Fall Prevention: safety round/check completed  Taken 1/8/2023 2200 by Mel Rodas RN  Safety Promotion/Fall Prevention: safety round/check completed  Taken 1/8/2023 2100 by Mel Rodas RN  Safety Promotion/Fall Prevention: safety round/check completed  Taken 1/8/2023 2000 by Mel Rodas RN  Safety Promotion/Fall Prevention: safety round/check completed  Taken 1/8/2023 1900 by Mel Rodas RN  Safety Promotion/Fall Prevention: safety round/check completed  Intervention: Prevent Skin Injury  Recent Flowsheet Documentation  Taken 1/9/2023 0400 by Mel Rodas RN  Body Position:   right   turned  Taken 1/9/2023 0200 by Mel Rodas RN  Body Position:   position changed independently   left   turned   right  Taken 1/9/2023 0100 by Mel Rodas RN  Body Position: position changed independently  Taken 1/9/2023 0000 by Adrianna  RIKA Leal  Body Position:   left   turned  Taken 1/8/2023 2200 by Mel Rodas RN  Body Position:   left   turned  Taken 1/8/2023 2000 by Mel Rodas RN  Body Position:   position changed independently   right   left   turned  Skin Protection: adhesive use limited  Intervention: Prevent and Manage VTE (Venous Thromboembolism) Risk  Recent Flowsheet Documentation  Taken 1/8/2023 2000 by Mel Rodas RN  Activity Management: bedrest  Goal: Optimal Comfort and Wellbeing  Intervention: Monitor Pain and Promote Comfort  Recent Flowsheet Documentation  Taken 1/9/2023 0000 by Mel Rodas RN  Pain Management Interventions: position adjusted  Taken 1/8/2023 2000 by Mel Rodas RN  Pain Management Interventions: position adjusted  Intervention: Provide Person-Centered Care  Recent Flowsheet Documentation  Taken 1/9/2023 0000 by Mel Rodas RN  Trust Relationship/Rapport: care explained  Taken 1/8/2023 2000 by Mel Rodas RN  Trust Relationship/Rapport:   care explained   reassurance provided   Goal Outcome Evaluation:  Plan of Care Reviewed With: patient

## 2023-01-09 NOTE — THERAPY EVALUATION
Patient Name: Ave Ramirez  : 1950    MRN: [id number]                              Today's Date: 2023       Admit Date: 2023    Visit Dx:     ICD-10-CM ICD-9-CM   1. Dysarthria  R47.1 784.51   2. Urinary tract infection with hematuria, site unspecified  N39.0 599.0    R31.9 599.70   3. Cerebrovascular accident (CVA) due to thrombosis of left middle cerebral artery (HCC)  I63.312 434.01   4. Other cerebrovascular vasospasm and vasoconstriction  I67.848 437.8   5. Impaired mobility and ADLs  Z74.09 V49.89    Z78.9      Patient Active Problem List   Diagnosis   • Hypothyroidism   • Atherosclerotic heart disease of native coronary artery without angina pectoris   • PVD (peripheral vascular disease) ([facility])   • Dyspnea on exertion   • CAD (coronary artery disease)   • Essential hypertension   • Palpitations   • Dizziness   • CVA (cerebral vascular accident) ([facility])   • Dysarthria     Past Medical History:   Diagnosis Date   • Acute angina ([facility])    • Acute bronchitis    • Acute sinusitis    • Anxiety    • Atherosclerotic heart disease of native coronary artery without angina pectoris    • Borderline glaucoma    • CAD (coronary artery disease)    • Cough    • Depression    • Disease of gallbladder     s/p lap jocelyne and normal ioc      • Encounter for gynecological examination (general) (routine) without abnormal findings    • Encounter for screening mammogram for malignant neoplasm of breast    • History of echocardiogram 2014    Normal LV systolic funciton with Ef of 55% with diastolic relaxation abnormality of the left ventricle. Mild mitral regurgitation.   • Hyperlipidemia    • Hypertension    • Hypothyroidism    • Keratoconjunctivitis sicca ([facility])    • Myopia     refractive change OD    • Nausea and vomiting    • Nuclear cataract    • PVD (peripheral vascular disease) ([facility])    • SOB (shortness of breath)    • Upper respiratory infection      Past Surgical History:   Procedure Laterality Date   •  CARDIAC CATHETERIZATION  2014    Medically manageable coronary artery disease in the previously placed stent in the diagonal coronary artery was patent with liminal irregularities. Normal LV systolic function with no wall motion abnormalities   • CARDIAC CATHETERIZATION     •  SECTION     • CHOLECYSTECTOMY  2012    intraoperative cholangiogram. Cholecystitis & cholelithiasis   • CORONARY STENT PLACEMENT      [facility]   • MAMMO BILATERAL  2016    DIAG MAMM, BILAT DIGITAL  (Medicare) (Other abnormal and inconclusive findings on diagnostic imaging of breast)    • MAMMO BILATERAL  2016    SCREENING MAMMOGRAPHY DIGITAL  (Medicare) (Encounter for screening mammogram for malignant neoplasm of breast)    • OTHER SURGICAL HISTORY  2003    OCT DISC [facility] [id number] (Borderline glaucoma)    • TONSILLECTOMY        General Information     Row Name 23 1404          OT Time and Intention    Document Type evaluation  -CM     Mode of Treatment co-treatment;physical therapy;occupational therapy  -CM     Row Name 23 1404          General Information    Patient Profile Reviewed yes  -CM     Prior Level of Function independent:;all household mobility;gait;transfer;ADL's;dependent:;driving  Except bra at times  -CM     Existing Precautions/Restrictions fall  -CM     Barriers to Rehab medically complex;previous functional deficit  -CM     Row Name 23 1404          Living Environment    People in Home spouse;child(hardik), adult  -CM     Row Name 23 1404          Home Main Entrance    Number of Stairs, Main Entrance two  -CM     Stair Railings, Main Entrance none  -CM     Row Name 23 1404          Stairs Within Home, Primary    Stairs, Within Home, Primary Uses SPC. Tub/shower w/ seat and clamp bars inside tub if needed. Regular toielt seat. Spouse reported he has offered to put in grab bars around bathroom but pt does not want them.  -CM     Number of Stairs, Within  Home, Primary none  -CM     Row Name 01/09/23 1404          Cognition    Orientation Status (Cognition) unable/difficult to assess  -     Row Name 01/09/23 1404          Safety Issues, Functional Mobility    Impairments Affecting Function (Mobility) balance;endurance/activity tolerance;shortness of breath;strength  -CM           User Key  (r) = Recorded By, (t) = Taken By, (c) = Cosigned By    Initials Name Provider Type    Carolina Hansen OT Occupational Therapist                 Mobility/ADL's     Row Name 01/09/23 1404          Bed Mobility    Bed Mobility supine-sit;sit-supine  -CM     Supine-Sit [place] (Bed Mobility) contact guard;1 person assist  -CM     Sit-Supine [place] (Bed Mobility) contact guard;1 person assist  -CM     Assistive Device (Bed Mobility) bed rails;head of bed elevated  -CM     Row Name 01/09/23 1404          Transfers    Transfers sit-stand transfer;stand-sit transfer;toilet transfer  -CM     Row Name 01/09/23 1404          Sit-Stand Transfer    Sit-Stand [place] (Transfers) 1 person assist;contact guard  -CM     Assistive Device (Sit-Stand Transfers) cane, straight  -CM     Row Name 01/09/23 1404          Stand-Sit Transfer    Stand-Sit [place] (Transfers) contact guard;1 person assist  -CM     Assistive Device (Stand-Sit Transfers) cane, straight  -CM     Row Name 01/09/23 1404          Toilet Transfer    Type (Toilet Transfer) sit-stand;stand-sit  -CM     [place] Level (Toilet Transfer) contact guard  -CM     Assistive Device (Toilet Transfer) cane, straight  -CM     Row Name 01/09/23 1404          Functional Mobility    Functional Mobility- Ind. Level contact guard assist;1 person  -CM     Functional Mobility- Device cane, straight  -CM     Row Name 01/09/23 1404          Activities of Daily Living    BADL Assessment/Intervention lower body dressing  -CM     Row Name 01/09/23 1404          Lower Body Dressing Assessment/Training    [place] Level (Lower  Body Dressing) lower body dressing skills;doff;don;shoes/slippers;verbal cues  -CM     Position (Lower Body Dressing) edge of bed sitting  -CM     Comment, (Lower Body Dressing) verbal cues for getting heels all the way into slippers  -CM           User Key  (r) = Recorded By, (t) = Taken By, (c) = Cosigned By    Initials Name Provider Type    Carolina Hansen OT Occupational Therapist               Obj/Interventions     Row Name 01/09/23 1606          Sensory Assessment (Somatosensory)    Sensory Assessment (Somatosensory) right UE  -CM     Right UE Sensory Assessment impaired  -CM     Sensory Subjective Reports hypersensitivity;numbness;tingling  -CM     Row Name 01/09/23 1606          Range of Motion Comprehensive    Comment, General Range of Motion B elbows and hands WFL. L shoulder flexion to 120 degrees. R shoulder flexion 20 degrees.  -CM     Row Name 01/09/23 1606          Strength Comprehensive (MMT)    Comment, General Manual Muscle Testing (MMT) Assessment Pt demonstrated 4-/5 strength on LUE. Demonstrated 3+/5 strength for R elbow and . 2+/5 strength for R shoudler flexion.  -CM           User Key  (r) = Recorded By, (t) = Taken By, (c) = Cosigned By    Initials Name Provider Type    Carolina Hansen OT Occupational Therapist               Goals/Plan     Row Name 01/09/23 1405          Bathing Goal 1 (OT)    Activity/Device (Bathing Goal 1, OT) lower body bathing;long-handled sponge;shower chair  -CM     [place] Level/Cues Needed (Bathing Goal 1, OT) modified independence  -CM     Time Frame (Bathing Goal 1, OT) long term goal (LTG);by discharge  -CM     Progress/Outcomes (Bathing Goal 1, OT) goal not met  -CM     Row Name 01/09/23 1405          Dressing Goal 1 (OT)    Activity/Device (Dressing Goal 1, OT) lower body dressing;sock-aid;reacher;long-handled shoe horn  -CM     [place]/Cues Needed (Dressing Goal 1, OT) modified independence  -CM     Time Frame (Dressing Goal 1, OT) long term  goal (LTG);by discharge  -CM     Progress/Outcome (Dressing Goal 1, OT) goal not met  -CM     Row Name 01/09/23 1405          Strength Goal 1 (OT)    Strength Goal 1 (OT) Pt will complete at least 20 minutes UE strengthening activities with VSS and only min fatigue for increased endrance and safety required for ADLs and mobility.  -CM     Time Frame (Strength Goal 1, OT) long term goal (LTG);by discharge  -CM     Progress/Outcome (Strength Goal 1, OT) goal not met  -CM     Row Name 01/09/23 1405          Problem Specific Goal 1 (OT)    Problem Specific Goal 1 (OT) Pt will complete at least 20 minutes OOB ADLs for increased endurance, safety, and (I) with ADLs and mobility.  -CM     Time Frame (Problem Specific Goal 1, OT) long term goal (LTG);by discharge  -CM     Progress/Outcome (Problem Specific Goal 1, OT) goal not met  -CM     Row Name 01/09/23 1400          Therapy Assessment/Plan (OT)    Planned Therapy Interventions (OT) activity tolerance training;adaptive equipment training;BADL retraining;cognitive/visual perception retraining;manual therapy/joint mobilization;IADL retraining;functional balance retraining;edema control/reduction;neuromuscular control/coordination retraining;occupation/activity based interventions;ROM/therapeutic exercise;patient/caregiver education/training;strengthening exercise;passive ROM/stretching;transfer/mobility retraining  -CM           User Key  (r) = Recorded By, (t) = Taken By, (c) = Cosigned By    Initials Name Provider Type    Carolina Hansen OT Occupational Therapist               Clinical Impression     Row Name 01/09/23 3840          Pain Assessment    Pain Location - Side/Orientation Right  -CM     Pain Location lower  -CM     Pain Location - extremity;other (see comments)  pain R side per pt  -CM     Pre/Posttreatment Pain Comment tingling and pain entire R side. Unable to state a number  -CM     Pain Intervention(s) Repositioned  -CM     Additional Documentation Pain  Scale: FACES Pre/Post-Treatment (Group)  -CM     Row Name 01/09/23 1405          Plan of Care Review    Plan of Care Reviewed With patient;spouse  -CM     Outcome Evaluation initial OT eval completed. Co-eval with PT. Upon arrival, spouse reported she had just gone to the bathroom and used the restroom on her own. Pt was breathing heavily. Pt reported she is (I) with all ADLs and assists with many IADLs. Pt demonstrates expressive aphasia and dysarthria. Pt demonstrated 4-/5 strength on LUE. Demonstrated 3+/5 strength for R elbow and . 2+/5 strength for R shoudler flexion. R shoudler flexion of 20 degrees with all other joints WFL. Pt reports oversensitivity, numbness, and tingling with entire R side of body. Pt completed sup <> sit with CGA with use of bed rails. Pt completed LB dressing of slippers with cues required to push feet all the way into the shoes. Pt completed sit <> stand with CGA. Completed mobility to and from the bathroom with CGA and SPC. CGA for toilet t/f. Pt became SOA and was breathing heavily during mobility. Pt frequently became frustrated during eval when therapists and spouse did not understand her wants/needs. I/P OT will follow d/t pt demonstrating decreased safety, endurance, and (I) with ADLs and mobility. Pt may benefit from  OT services at d/c for increased (I), safety, and endurance for ADLs and mobility at home. Goals established.  -     Row Name 01/09/23 8472          Therapy Assessment/Plan (OT)    Patient/Family Therapy Goal Statement (OT) return home  -CM     Rehab Potential (OT) good, to achieve stated therapy goals  -     Criteria for Skilled Therapeutic Interventions Met (OT) yes;meets criteria  -     Therapy Frequency (OT) other (see comments)  5-7 d/wk  -CM     Predicted Duration of Therapy Intervention (OT) until d/c or all goals met  -     Row Name 01/09/23 7009          Therapy Plan Review/Discharge Plan (OT)    Anticipated Discharge Disposition (OT) home  with assist;home with home health  -CM     Row Name 01/09/23 1404          Vital Signs    Pre Systolic BP Rehab 145  -CM     Pre Treatment Diastolic BP 90  -CM     Intra Systolic BP Rehab 165  -CM     Intra Treatment Diastolic BP 88  -CM     Pretreatment Heart Rate (beats/min) 91  -CM     Intratreatment Heart Rate (beats/min) 82  -CM     Pre SpO2 (%) 91  -CM     O2 Delivery Pre Treatment room air  -CM     Intra SpO2 (%) 93  -CM     O2 Delivery Intra Treatment room air  -CM     Pre Patient Position Supine  -CM     Intra Patient Position Sitting  -CM     Row Name 01/09/23 1404          Positioning and Restraints    Pre-Treatment Position in bed  -CM     Post Treatment Position bed  -CM     In Bed supine;call light within reach;encouraged to call for assist;exit alarm on;with family/caregiver;side rails up x2  -CM           User Key  (r) = Recorded By, (t) = Taken By, (c) = Cosigned By    Initials Name Provider Type    CM Carolina Avelar OT Occupational Therapist               Outcome Measures     Row Name 01/09/23 1458 01/09/23 1100       How much help from another person do you currently need...    Turning from your back to your side while in flat bed without using bedrails? 3  -MS 3  -PC    Moving from lying on back to sitting on the side of a flat bed without bedrails? 2  -MS 2  -PC    Moving to and from a bed to a chair (including a wheelchair)? 2  -MS 2  -PC    Standing up from a chair using your arms (e.g., wheelchair, bedside chair)? 1  -MS 1  -PC    Climbing 3-5 steps with a railing? 1  -MS 1  -PC    To walk in hospital room? 1  -MS 1  -PC    AM-PAC 6 Clicks Score (PT) 10  -MS 10  -PC    Highest level of mobility 4 --> Transferred to chair/commode  -MS 4 --> Transferred to chair/commode  -PC          User Key  (r) = Recorded By, (t) = Taken By, (c) = Cosigned By    Initials Name Provider Type    PC Nalini Matamoros RN Registered Nurse    Avril Jennings RN Registered Nurse                 Occupational Therapy Education     Title: PT OT SLP Therapies (In Progress)     Topic: Occupational Therapy (In Progress)     Point: ADL training (Done)     Description:   Instruct learner(s) on proper safety adaptation and remediation techniques during self care or transfers.   Instruct in proper use of assistive devices.              Learning Progress Summary           Patient Acceptance, E,TB, VU by WOLFGANG at 1/9/2023 8778    Comment: OT POC, Role of OT, safe ADL t/fs, d/c recommendations                   Point: Home exercise program (Not Started)     Description:   Instruct learner(s) on appropriate technique for monitoring, assisting and/or progressing therapeutic exercises/activities.              Learner Progress:  Not documented in this visit.          Point: Precautions (Not Started)     Description:   Instruct learner(s) on prescribed precautions during self-care and functional transfers.              Learner Progress:  Not documented in this visit.          Point: Body mechanics (Not Started)     Description:   Instruct learner(s) on proper positioning and spine alignment during self-care, functional mobility activities and/or exercises.              Learner Progress:  Not documented in this visit.                      User Key     Initials Effective Dates Name Provider Type Discipline    WOLFGANG 11/18/22 -  Carolina Avelar OT Occupational Therapist OT              OT Recommendation and Plan  Planned Therapy Interventions (OT): activity tolerance training, adaptive equipment training, BADL retraining, cognitive/visual perception retraining, manual therapy/joint mobilization, IADL retraining, functional balance retraining, edema control/reduction, neuromuscular control/coordination retraining, occupation/activity based interventions, ROM/therapeutic exercise, patient/caregiver education/training, strengthening exercise, passive ROM/stretching, transfer/mobility retraining  Therapy Frequency (OT): other (see  comments) (5-7 d/wk)  Plan of Care Review  Plan of Care Reviewed With: patient, spouse  Outcome Evaluation: initial OT eval completed. Co-eval with PT. Upon arrival, spouse reported she had just gone to the bathroom and used the restroom on her own. Pt was breathing heavily. Pt reported she is (I) with all ADLs and assists with many IADLs. Pt demonstrates expressive aphasia and dysarthria. Pt demonstrated 4-/5 strength on LUE. Demonstrated 3+/5 strength for R elbow and . 2+/5 strength for R shoudler flexion. R shoudler flexion of 20 degrees with all other joints WFL. Pt reports oversensitivity, numbness, and tingling with entire R side of body. Pt completed sup <> sit with CGA with use of bed rails. Pt completed LB dressing of slippers with cues required to push feet all the way into the shoes. Pt completed sit <> stand with CGA. Completed mobility to and from the bathroom with CGA and SPC. CGA for toilet t/f. Pt became SOA and was breathing heavily during mobility. Pt frequently became frustrated during eval when therapists and spouse did not understand her wants/needs. I/P OT will follow d/t pt demonstrating decreased safety, endurance, and (I) with ADLs and mobility. Pt may benefit from  OT services at d/c for increased (I), safety, and endurance for ADLs and mobility at home. Goals established.     Time Calculation:    Time Calculation- OT     Row Name 01/09/23 1505             Time Calculation- OT    OT Start Time 1405  -CM      OT Stop Time 1459  -CM      OT Time Calculation (min) 54 min  -CM      OT Received On 01/09/23  -CM      OT Goal Re-Cert Due Date 01/22/23  -CM         Untimed Charges    OT Eval/Re-eval Minutes 54  -CM         Total Minutes    Untimed Charges Total Minutes 54  -CM       Total Minutes 54  -CM            User Key  (r) = Recorded By, (t) = Taken By, (c) = Cosigned By    Initials Name Provider Type    Carolina Hansen OT Occupational Therapist              Therapy Charges for Today      Code Description Service Date Service Provider Modifiers Qty    30546628028 HC OT EVAL MOD COMPLEXITY 4 1/9/2023 Carolina Avelar OT GO 1               Carolina Avelar OT  1/9/2023

## 2023-01-09 NOTE — PLAN OF CARE
Goal Outcome Evaluation:           Progress: improving  Outcome Evaluation: Speech evaluation: Pt with dysarthria and expressive aphasia. Pt with decreased speech intelligiblity this date at the conversational level. SLP focused on education of over-articulation, articulation of initial sound, and short phrases. Pt frustrated at speech deficits and attempts to communicate with  and SLP. SLP provided picture communication board and alphabet board to assist in functional communication. Pt to benefit from skilled ST to address dysarthria and  expressive language deficits.

## 2023-01-09 NOTE — PROGRESS NOTES
Stroke Progress Note       Chief Complaint: Worsening aphasia    Subjective     HPI: Pt is a 73-yr-old left-handed white female with known diagnosis of hypertension, previous left MCA stroke, with right side deficits and expressive aphasia, CAD, and PAD who presented yesterday with worsening aphasia. Upon exam her speech is garbled and incomprehensible, right-side weakness 3/5, Numbness to right face, arm, and leg, and visual field is intact.       Review of Systems   HENT: Negative.    Eyes: Negative.    Respiratory: Negative.    Cardiovascular: Negative.    Gastrointestinal: Negative.    Genitourinary: Negative.    Musculoskeletal: Negative.    Skin: Negative.    Neurological: Positive for speech difficulty and weakness.   Psychiatric/Behavioral: Negative.         Objective      Temp:  [97 °F (36.1 °C)-98.1 °F (36.7 °C)] 97.6 °F (36.4 °C)  Heart Rate:  [80-95] 81  Resp:  [15-20] 16  BP: (111-178)/(55-87) 134/69    Neurological Exam  Mental Status  Alert. Moderate dysarthria present. Expressive aphasia present.    Cranial Nerves  CN II: Visual acuity is normal. Visual fields full to confrontation.  CN III, IV, VI: Extraocular movements intact bilaterally. Normal lids and orbits bilaterally. Pupils equal round and reactive to light bilaterally.  CN V: Facial sensation is normal.  CN VII: Full and symmetric facial movement.  CN IX, X: Palate elevates symmetrically. Normal gag reflex.  CN XI: Shoulder shrug strength is normal.  CN XII: Tongue midline without atrophy or fasciculations.    Motor  Normal muscle bulk throughout. No fasciculations present. Right pronator drift.    Sensory  Light touch abnormality: Right face, arm, and leg.     Reflexes  Not assessed.    Coordination    Finger-to-nose, rapid alternating movements and heel-to-shin normal bilaterally without dysmetria.    Gait    Not assessed.      Physical Exam  Eyes:      General: Lids are normal.      Extraocular Movements: Extraocular movements intact.       Pupils: Pupils are equal, round, and reactive to light.   Neurological:      Mental Status: She is alert.      Cranial Nerves: Dysarthria present.      Coordination: Coordination is intact.         Results Review:    I reviewed the patient's new clinical results.    Lab Results (last 24 hours)     Procedure Component Value Units Date/Time    Hemoglobin A1c [[id number]]  (Abnormal) Collected: 01/09/23 0409    Specimen: Blood Updated: 01/09/23 0457     Hemoglobin A1C 5.90 %     Narrative:      Hemoglobin A1C Ranges:    Increased Risk for Diabetes  5.7% to 6.4%  Diabetes                     >= 6.5%  Diabetic Goal                < 7.0%    Comprehensive Metabolic Panel [[id number]]  (Abnormal) Collected: 01/09/23 0409    Specimen: Blood Updated: 01/09/23 0451     Glucose 119 mg/dL      BUN 9 mg/dL      Creatinine 0.71 mg/dL      Sodium 137 mmol/L      Potassium 4.1 mmol/L      Chloride 100 mmol/L      CO2 27.0 mmol/L      Calcium 9.0 mg/dL      Total Protein 6.4 g/dL      Albumin 3.8 g/dL      ALT (SGPT) 23 U/L      AST (SGOT) 23 U/L      Alkaline Phosphatase 120 U/L      Total Bilirubin 0.5 mg/dL      Globulin 2.6 gm/dL      A/G Ratio 1.5 g/dL      BUN/Creatinine Ratio 12.7     Anion Gap 10.0 mmol/L      eGFR 90.5 mL/min/1.73      Comment: National Kidney Foundation and American Society of Nephrology (ASN) Task Force recommended calculation based on the Chronic Kidney Disease Epidemiology Collaboration (CKD-EPI) equation refit without adjustment for race.       Narrative:      GFR Normal >60  Chronic Kidney Disease <60  Kidney Failure <15    The GFR formula is only valid for adults with stable renal function between ages 18 and 70.    Lipid Panel [[id number]] Collected: 01/09/23 0409    Specimen: Blood Updated: 01/09/23 0451     Total Cholesterol 142 mg/dL      Triglycerides 137 mg/dL      HDL Cholesterol 40 mg/dL      LDL Cholesterol  78 mg/dL      VLDL Cholesterol 24 mg/dL      LDL/HDL Ratio 1.87    Narrative:       Cholesterol Reference Ranges  (U.S. Department of Health and Human Services ATP III Classifications)    Desirable          <200 mg/dL  Borderline High    200-239 mg/dL  High Risk          >240 mg/dL      Triglyceride Reference Ranges  (U.S. Department of Health and Human Services ATP III Classifications)    Normal           <150 mg/dL  Borderline High  150-199 mg/dL  High             200-499 mg/dL  Very High        >500 mg/dL    HDL Reference Ranges  (U.S. Department of Health and Human Services ATP III Classifications)    Low     <40 mg/dl (major risk factor for CHD)  High    >60 mg/dl ('negative' risk factor for CHD)        LDL Reference Ranges  (U.S. Department of Health and Human Services ATP III Classifications)    Optimal          <100 mg/dL  Near Optimal     100-129 mg/dL  Borderline High  130-159 mg/dL  High             160-189 mg/dL  Very High        >189 mg/dL    Magnesium [[id number]]  (Normal) Collected: 01/09/23 0409    Specimen: Blood Updated: 01/09/23 0451     Magnesium 2.0 mg/dL     Phosphorus [[id number]]  (Abnormal) Collected: 01/09/23 0409    Specimen: Blood Updated: 01/09/23 0451     Phosphorus 4.7 mg/dL     CBC Auto Differential [[id number]]  (Normal) Collected: 01/09/23 0409    Specimen: Blood Updated: 01/09/23 0430     WBC 7.21 10*3/mm3      RBC 4.60 10*6/mm3      Hemoglobin 12.6 g/dL      Hematocrit 38.9 %      MCV 84.6 fL      MCH 27.4 pg      MCHC 32.4 g/dL      RDW 14.2 %      RDW-SD 43.3 fl      MPV 10.0 fL      Platelets 178 10*3/mm3      Neutrophil % 58.2 %      Lymphocyte % 26.6 %      Monocyte % 8.5 %      Eosinophil % 5.5 %      Basophil % 0.8 %      Immature Grans % 0.4 %      Neutrophils, Absolute 4.19 10*3/mm3      Lymphocytes, Absolute 1.92 10*3/mm3      Monocytes, Absolute 0.61 10*3/mm3      Eosinophils, Absolute 0.40 10*3/mm3      Basophils, Absolute 0.06 10*3/mm3      Immature Grans, Absolute 0.03 10*3/mm3      nRBC 0.0 /100 WBC     POC Glucose Once [[id number]]  (Normal)  Collected: 01/08/23 2154    Specimen: Blood Updated: 01/08/23 2206     Glucose 116 mg/dL      Comment: Result Not ConfirmedOperator: [id number] PEEWEE IDAMeter ID: [id number]       STAT Lactic Acid, Reflex [[id number]]  (Normal) Collected: 01/08/23 2107    Specimen: Blood Updated: 01/08/23 2131     Lactate 1.9 mmol/L     POC Glucose Once [[id number]]  (Normal) Collected: 01/08/23 1802    Specimen: Blood Updated: 01/08/23 1930     Glucose 97 mg/dL      Comment: : [id number] GLENN CHAVEZAMeter ID: [id number]       STAT Lactic Acid, Reflex [[id number]]  (Abnormal) Collected: 01/08/23 1829    Specimen: Blood Updated: 01/08/23 1902     Lactate 2.4 mmol/L     STAT Lactic Acid, Reflex [[id number]]  (Abnormal) Collected: 01/08/23 1518    Specimen: Blood Updated: 01/08/23 1539     Lactate 2.5 mmol/L     TSH [[id number]]  (Normal) Collected: 01/08/23 1237    Specimen: Blood Updated: 01/08/23 1512     TSH 0.526 uIU/mL     Extra Tubes [[id number]] Collected: 01/08/23 1237    Specimen: Blood, Venous Line Updated: 01/08/23 1501    Narrative:      The following orders were created for panel order Extra Tubes.  Procedure                               Abnormality         Status                     ---------                               -----------         ------                     Gold Top - [facility][[id number]]                                   Final result                 Please view results for these tests on the individual orders.    [facility] [[id number]] Collected: 01/08/23 1237    Specimen: Blood Updated: 01/08/23 1501     Extra Tube Hold for add-ons.     Comment: Auto resulted.       Extra Tubes [[id number]] Collected: 01/08/23 1237    Specimen: Blood Updated: 01/08/23 1345    Narrative:      The following orders were created for panel order Extra Tubes.  Procedure                               Abnormality         Status                     ---------                               -----------         ------                      Lavender Top[[id number]]                                     Final result               Green Top (Gel)[[id number]]                                  Final result                 Please view results for these tests on the individual orders.    Lavender Top [[id number]] Collected: 01/08/23 1237    Specimen: Blood Updated: 01/08/23 1345     Extra Tube hold for add-on     Comment: Auto resulted       Green Top (Gel) [[id number]] Collected: 01/08/23 1237    Specimen: Blood Updated: 01/08/23 1345     Extra Tube Hold for add-ons.     Comment: Auto resulted.       Lactic Acid, Plasma [[id number]]  (Abnormal) Collected: 01/08/23 1235    Specimen: Blood Updated: 01/08/23 1321     Lactate 2.2 mmol/L     [place] Draw [[id number]] Collected: 01/08/23 1010    Specimen: Blood Updated: 01/08/23 1312    Narrative:      The following orders were created for panel order [place] Draw.  Procedure                               Abnormality         Status                     ---------                               -----------         ------                     Green Top (Gel)[[id number]]                                  Final result               Lavender Top[[id number]]                                     Final result               Gold Top - [facility]ST[[id number]]                                                              Light Blue Top[[id number]]                                   Final result                 Please view results for these tests on the individual orders.    Blood Culture - Blood, Arm, Right [[id number]] Collected: 01/08/23 1235    Specimen: Blood from Arm, Right Updated: 01/08/23 1242    Blood Culture - Blood, Arm, Right [[id number]] Collected: 01/08/23 1235    Specimen: Blood from Arm, Right Updated: 01/08/23 1242    Urinalysis, Microscopic Only - Urine, Clean Catch [[id number]]  (Abnormal) Collected: 01/08/23 1135    Specimen: Urine, Clean Catch Updated: 01/08/23 1206     RBC, UA 31-50 /HPF      WBC, UA 6-12 /HPF      Bacteria,  UA 1+ /HPF      Squamous Epithelial Cells, UA None Seen /HPF      Hyaline Casts, UA None Seen /LPF      Methodology Manual Light Microscopy    Urine Culture - Urine, Urine, Clean Catch [[id number]] Collected: 01/08/23 1135    Specimen: Urine, Clean Catch Updated: 01/08/23 1206    Urinalysis With Culture If Indicated - Urine, Clean Catch [[id number]]  (Abnormal) Collected: 01/08/23 1135    Specimen: Urine, Clean Catch Updated: 01/08/23 1152     Color, UA Yellow     Appearance, UA Clear     pH, UA 7.0     Specific [place], UA 1.084     Comment: Result obtained by Refractometer        Glucose, UA Negative     Ketones, UA Negative     Bilirubin, UA Negative     Blood, UA Moderate (2+)     Protein, UA 30 mg/dL (1+)     Leuk Esterase, UA Small (1+)     Nitrite, UA Positive     Urobilinogen, UA 0.2 E.U./dL    Narrative:      In absence of clinical symptoms, the presence of pyuria, bacteria, and/or nitrites on the urinalysis result does not correlate with infection.    Green Top (Gel) [[id number]] Collected: 01/08/23 1010    Specimen: Blood Updated: 01/08/23 1116     Extra Tube Hold for add-ons.     Comment: Auto resulted.       Lavender Top [[id number]] Collected: 01/08/23 1010    Specimen: Blood Updated: 01/08/23 1116     Extra Tube hold for add-on     Comment: Auto resulted       Light Blue Top [[id number]] Collected: 01/08/23 1010    Specimen: Blood Updated: 01/08/23 1116     Extra Tube Hold for add-ons.     Comment: Auto resulted       POC Glucose Once [[id number]]  (Abnormal) Collected: 01/08/23 1002    Specimen: Blood Updated: 01/08/23 1104     Glucose 142 mg/dL      Comment: Result Not ConfirmedOperator: [id number] SRI Alvin ID: [id number]       Comprehensive Metabolic Panel [[id number]]  (Abnormal) Collected: 01/08/23 1010    Specimen: Blood Updated: 01/08/23 1041     Glucose 152 mg/dL      BUN 8 mg/dL      Creatinine 0.82 mg/dL      Sodium 135 mmol/L      Potassium 4.0 mmol/L      Comment: Slight hemolysis  detected by analyzer. Results may be affected.        Chloride 99 mmol/L      CO2 24.0 mmol/L      Calcium 9.4 mg/dL      Total Protein 7.2 g/dL      Albumin 4.1 g/dL      ALT (SGPT) 26 U/L      AST (SGOT) 25 U/L      Alkaline Phosphatase 143 U/L      Total Bilirubin 0.6 mg/dL      Globulin 3.1 gm/dL      A/G Ratio 1.3 g/dL      BUN/Creatinine Ratio 9.8     Anion Gap 12.0 mmol/L      eGFR 76.1 mL/min/1.73      Comment: National Kidney Foundation and American Society of Nephrology (ASN) Task Force recommended calculation based on the Chronic Kidney Disease Epidemiology Collaboration (CKD-EPI) equation refit without adjustment for race.       Narrative:      GFR Normal >60  Chronic Kidney Disease <60  Kidney Failure <15    The GFR formula is only valid for adults with stable renal function between ages 18 and 70.    Protime-INR [[id number]]  (Normal) Collected: 01/08/23 1010    Specimen: Blood Updated: 01/08/23 1038     Protime 13.7 Seconds      INR 1.06    Narrative:      Therapeutic range for most indications is 2.0-3.0 INR,  or 2.5-3.5 for mechanical heart valves.    aPTT [[id number]]  (Normal) Collected: 01/08/23 1010    Specimen: Blood Updated: 01/08/23 1038     PTT 33.4 seconds     Narrative:      The recommended Heparin therapeutic range is 68-97 seconds.    Troponin [[id number]]  (Normal) Collected: 01/08/23 1010    Specimen: Blood Updated: 01/08/23 1033     Troponin T <0.010 ng/mL     Narrative:      Troponin T Reference Range:  <= 0.03 ng/mL-   Negative for AMI  >0.03 ng/mL-     Abnormal for myocardial necrosis.  Clinicians would have to utilize clinical acumen, EKG, Troponin and serial changes to determine if it is an Acute Myocardial Infarction or myocardial injury due to an underlying chronic condition.       Results may be falsely decreased if patient taking Biotin.      CBC & Differential [[id number]]  (Abnormal) Collected: 01/08/23 1010    Specimen: Blood Updated: 01/08/23 1016    Narrative:      The  following orders were created for panel order CBC & Differential.  Procedure                               Abnormality         Status                     ---------                               -----------         ------                     CBC Auto Differential[[id number]]        Abnormal            Final result                 Please view results for these tests on the individual orders.    CBC Auto Differential [[id number]]  (Abnormal) Collected: 01/08/23 1010    Specimen: Blood Updated: 01/08/23 1016     WBC 8.44 10*3/mm3      RBC 5.09 10*6/mm3      Hemoglobin 13.9 g/dL      Hematocrit 42.8 %      MCV 84.1 fL      MCH 27.3 pg      MCHC 32.5 g/dL      RDW 14.1 %      RDW-SD 43.1 fl      MPV 9.9 fL      Platelets 185 10*3/mm3      Neutrophil % 58.1 %      Lymphocyte % 28.9 %      Monocyte % 6.9 %      Eosinophil % 4.9 %      Basophil % 0.8 %      Immature Grans % 0.4 %      Neutrophils, Absolute 4.91 10*3/mm3      Lymphocytes, Absolute 2.44 10*3/mm3      Monocytes, Absolute 0.58 10*3/mm3      Eosinophils, Absolute 0.41 10*3/mm3      Basophils, Absolute 0.07 10*3/mm3      Immature Grans, Absolute 0.03 10*3/mm3      nRBC 0.0 /100 WBC         CT Angiogram Neck    Result Date: 1/8/2023  100% stenosis.  IMPRESSION: No large vessel occlusion or significant stenosis in the arteries of the head and neck. CT PERFUSION FINDINGS: HCC focus of cerebral blood flow less than 30% located in the left posterior frontal or parietal region. No areas of Tmax greater than six seconds visualized. Findings are likely due to the old infarct in this area. The time to peak, mean transit time, cerebral blood flow, and cerebral blood volume maps are otherwise symmetric and normal. There is no ischemic core or penumbra. CBF < 30% VOLUME: 8 mL. TMAX > 6.0sec VOLUME: 0 mL. MISMATCH VOLUME: -8 mL. MISMATCH RATIO: 0 mL. Additional findings: Degenerative changes of the cervical spine. Fusion of C2 and C3 vertebral bodies. IMPRESSION: No acute CTA  head and neck abnormality. Abnormal perfusion findings of less than 30% cerebral blood flow with a volume of 8 mL in the left MCA territory. Less than 45% flow in this area seen on the blood vessel density images with rapid AI. Proximal aspect of the left vertebral artery nonopacified. An otherwise patent, nondominant left vertebral artery arises from collaterals in the lower cervical region. These findings are chronic and are likely congenital. Electronically signed by:  Ariel Degroot MD  1/8/2023 11:48 AM CST Workstation: [email or web address]    XR Chest 1 View    Result Date: 1/8/2023  No acute pulmonary disease. Electronically signed by:  Ariel Degroot MD  1/8/2023 12:08 PM CST Workstation: [email or web address]    CT Head Without Contrast Stroke Protocol    Result Date: 1/8/2023  No evidence of intracranial hemorrhage, mass effect or large acute infarct. Old infarct noted in the left turbo hemisphere similar to CTA neck dated 2/20/2022. Electronically signed by:  Ariel Degroot MD  1/8/2023 10:49 AM CST Workstation: [email or web address]    CT Angiogram Head w AI Analysis of LVO    Result Date: 1/8/2023  100% stenosis.  IMPRESSION: No large vessel occlusion or significant stenosis in the arteries of the head and neck. CT PERFUSION FINDINGS: HCC focus of cerebral blood flow less than 30% located in the left posterior frontal or parietal region. No areas of Tmax greater than six seconds visualized. Findings are likely due to the old infarct in this area. The time to peak, mean transit time, cerebral blood flow, and cerebral blood volume maps are otherwise symmetric and normal. There is no ischemic core or penumbra. CBF < 30% VOLUME: 8 mL. TMAX > 6.0sec VOLUME: 0 mL. MISMATCH VOLUME: -8 mL. MISMATCH RATIO: 0 mL. Additional findings: Degenerative changes of the cervical spine. Fusion of C2 and C3 vertebral bodies. IMPRESSION: No acute CTA head and neck abnormality. Abnormal perfusion findings of less than 30% cerebral blood flow with a volume of 8 mL in  the left MCA territory. Less than 45% flow in this area seen on the blood vessel density images with rapid AI. Proximal aspect of the left vertebral artery nonopacified. An otherwise patent, nondominant left vertebral artery arises from collaterals in the lower cervical region. These findings are chronic and are likely congenital. Electronically signed by:  Ariel Degorot MD  1/8/2023 11:48 AM CST Workstation: [id number]    CT CEREBRAL PERFUSION WITH & WITHOUT CONTRAST    Result Date: 1/8/2023  100% stenosis.  IMPRESSION: No large vessel occlusion or significant stenosis in the arteries of the head and neck. CT PERFUSION FINDINGS: HCC focus of cerebral blood flow less than 30% located in the left posterior frontal or parietal region. No areas of Tmax greater than six seconds visualized. Findings are likely due to the old infarct in this area. The time to peak, mean transit time, cerebral blood flow, and cerebral blood volume maps are otherwise symmetric and normal. There is no ischemic core or penumbra. CBF < 30% VOLUME: 8 mL. TMAX > 6.0sec VOLUME: 0 mL. MISMATCH VOLUME: -8 mL. MISMATCH RATIO: 0 mL. Additional findings: Degenerative changes of the cervical spine. Fusion of C2 and C3 vertebral bodies. IMPRESSION: No acute CTA head and neck abnormality. Abnormal perfusion findings of less than 30% cerebral blood flow with a volume of 8 mL in the left MCA territory. Less than 45% flow in this area seen on the blood vessel density images with rapid AI. Proximal aspect of the left vertebral artery nonopacified. An otherwise patent, nondominant left vertebral artery arises from collaterals in the lower cervical region. These findings are chronic and are likely congenital. Electronically signed by:  Ariel Degroot MD  1/8/2023 11:48 AM CST Workstation: [id number]    Results for orders placed during the hospital encounter of 11/22/19    Adult Transthoracic Echo Complete W/ Cont if Necessary Per Protocol    Interpretation Summary  ·  The study is technically difficult for diagnosis.  · Left ventricular wall thickness is consistent with mild concentric hypertrophy.  · Estimated EF = 58%.  · Left ventricular systolic function is normal.  · Left ventricular diastolic dysfunction (grade I) consistent with impaired relaxation.  · Right ventricular cavity is borderline dilated.  · Mild mitral valve regurgitation is present        Assessment/Plan     Assessment/Plan: Pt is a 73-yr-old left-handed white female with known diagnosis of hypertension, previous left MCA stroke, with right side deficits and expressive aphasia, CAD, and PAD who presented yesterday with worsening aphasia. Upon exam her speech is garbled and incomprehensible, right-side weakness 3/5, Numbness to right face, arm, and leg, and visual field is intact.   1. Worsening Dysarthria- MRI neg for acute stroke, likely recrudescence of stroke symptoms. Continue DAPT and statin.  2. Hypertension- Normal BP goals.  3. UTI- Hospitalist is managing antibiotic therapy.  4. Activity- Okay to work with PT/OT.  5. Diet- Heart-healthy diet.  Case was discussed with pt, Dr. Panchal, Hospitalist team, and nursing. Neurology will sign off.         Patient Active Problem List   Diagnosis   • Hypothyroidism   • Atherosclerotic heart disease of native coronary artery without angina pectoris   • PVD (peripheral vascular disease) ([facility])   • Dyspnea on exertion   • CAD (coronary artery disease)   • Essential hypertension   • Palpitations   • Dizziness   • CVA (cerebral vascular accident) ([facility])   • Dysarthria           CAROLE Marley  01/09/23  07:59 CST        I spent 40 minutes caring for Ave Ramirez  on this date of service. This time includes time spent by me in the following activities: preparing for the visit, reviewing tests, obtaining and/or reviewing a separately obtained history, performing a medically appropriate examination and/or evaluation, counseling and educating the  patient/family/caregiver, ordering medications, tests, or procedures, referring and communicating with other health care professionals, documenting information in the medical record, independently interpreting results and communicating that information with the patient/family/caregiver and care coordination

## 2023-01-10 LAB
ANION GAP SERPL CALCULATED.3IONS-SCNC: 11 MMOL/L (ref 5–15)
BACTERIA SPEC AEROBE CULT: ABNORMAL
BASOPHILS # BLD AUTO: 0.05 10*3/MM3 (ref 0–0.2)
BASOPHILS NFR BLD AUTO: 0.7 % (ref 0–1.5)
BUN SERPL-MCNC: 8 MG/DL (ref 8–23)
BUN/CREAT SERPL: 10.8 (ref 7–25)
CALCIUM SPEC-SCNC: 9 MG/DL (ref 8.6–10.5)
CHLORIDE SERPL-SCNC: 98 MMOL/L (ref 98–107)
CK SERPL-CCNC: 61 U/L (ref 20–180)
CO2 SERPL-SCNC: 27 MMOL/L (ref 22–29)
CREAT SERPL-MCNC: 0.74 MG/DL (ref 0.57–1)
DEPRECATED RDW RBC AUTO: 42.6 FL (ref 37–54)
EGFRCR SERPLBLD CKD-EPI 2021: 86.1 ML/MIN/1.73
EOSINOPHIL # BLD AUTO: 0.42 10*3/MM3 (ref 0–0.4)
EOSINOPHIL NFR BLD AUTO: 6.1 % (ref 0.3–6.2)
ERYTHROCYTE [DISTWIDTH] IN BLOOD BY AUTOMATED COUNT: 13.9 % (ref 12.3–15.4)
GLUCOSE BLDC GLUCOMTR-MCNC: 119 MG/DL (ref 70–130)
GLUCOSE BLDC GLUCOMTR-MCNC: 121 MG/DL (ref 70–130)
GLUCOSE BLDC GLUCOMTR-MCNC: 177 MG/DL (ref 70–130)
GLUCOSE SERPL-MCNC: 128 MG/DL (ref 65–99)
HCT VFR BLD AUTO: 38.3 % (ref 34–46.6)
HGB BLD-MCNC: 12.6 G/DL (ref 12–15.9)
IMM GRANULOCYTES # BLD AUTO: 0.03 10*3/MM3 (ref 0–0.05)
IMM GRANULOCYTES NFR BLD AUTO: 0.4 % (ref 0–0.5)
LYMPHOCYTES # BLD AUTO: 1.69 10*3/MM3 (ref 0.7–3.1)
LYMPHOCYTES NFR BLD AUTO: 24.5 % (ref 19.6–45.3)
MAGNESIUM SERPL-MCNC: 1.9 MG/DL (ref 1.6–2.4)
MCH RBC QN AUTO: 27.7 PG (ref 26.6–33)
MCHC RBC AUTO-ENTMCNC: 32.9 G/DL (ref 31.5–35.7)
MCV RBC AUTO: 84.2 FL (ref 79–97)
MONOCYTES # BLD AUTO: 0.62 10*3/MM3 (ref 0.1–0.9)
MONOCYTES NFR BLD AUTO: 9 % (ref 5–12)
NEUTROPHILS NFR BLD AUTO: 4.1 10*3/MM3 (ref 1.7–7)
NEUTROPHILS NFR BLD AUTO: 59.3 % (ref 42.7–76)
NRBC BLD AUTO-RTO: 0 /100 WBC (ref 0–0.2)
PLATELET # BLD AUTO: 172 10*3/MM3 (ref 140–450)
PMV BLD AUTO: 9.9 FL (ref 6–12)
POTASSIUM SERPL-SCNC: 3.8 MMOL/L (ref 3.5–5.2)
PROCALCITONIN SERPL-MCNC: 0.06 NG/ML (ref 0–0.25)
RBC # BLD AUTO: 4.55 10*6/MM3 (ref 3.77–5.28)
SODIUM SERPL-SCNC: 136 MMOL/L (ref 136–145)
WBC NRBC COR # BLD: 6.91 10*3/MM3 (ref 3.4–10.8)

## 2023-01-10 PROCEDURE — 82550 ASSAY OF CK (CPK): CPT | Performed by: INTERNAL MEDICINE

## 2023-01-10 PROCEDURE — 92507 TX SP LANG VOICE COMM INDIV: CPT | Performed by: SPEECH-LANGUAGE PATHOLOGIST

## 2023-01-10 PROCEDURE — 25010000002 CEFTRIAXONE PER 250 MG: Performed by: INTERNAL MEDICINE

## 2023-01-10 PROCEDURE — 85025 COMPLETE CBC W/AUTO DIFF WBC: CPT | Performed by: INTERNAL MEDICINE

## 2023-01-10 PROCEDURE — 82962 GLUCOSE BLOOD TEST: CPT

## 2023-01-10 PROCEDURE — 97116 GAIT TRAINING THERAPY: CPT

## 2023-01-10 PROCEDURE — 97110 THERAPEUTIC EXERCISES: CPT

## 2023-01-10 PROCEDURE — 84145 PROCALCITONIN (PCT): CPT | Performed by: INTERNAL MEDICINE

## 2023-01-10 PROCEDURE — 80048 BASIC METABOLIC PNL TOTAL CA: CPT | Performed by: INTERNAL MEDICINE

## 2023-01-10 PROCEDURE — 83735 ASSAY OF MAGNESIUM: CPT | Performed by: INTERNAL MEDICINE

## 2023-01-10 RX ADMIN — BUPROPION HYDROCHLORIDE 150 MG: 150 TABLET, EXTENDED RELEASE ORAL at 08:33

## 2023-01-10 RX ADMIN — GABAPENTIN 300 MG: 300 CAPSULE ORAL at 05:27

## 2023-01-10 RX ADMIN — Medication 10 ML: at 08:34

## 2023-01-10 RX ADMIN — HYDROCODONE BITARTRATE AND ACETAMINOPHEN 1 TABLET: 5; 325 TABLET ORAL at 11:00

## 2023-01-10 RX ADMIN — GABAPENTIN 300 MG: 300 CAPSULE ORAL at 22:50

## 2023-01-10 RX ADMIN — CLOPIDOGREL BISULFATE 75 MG: 75 TABLET ORAL at 08:33

## 2023-01-10 RX ADMIN — PANTOPRAZOLE SODIUM 40 MG: 40 TABLET, DELAYED RELEASE ORAL at 06:02

## 2023-01-10 RX ADMIN — HYDROCODONE BITARTRATE AND ACETAMINOPHEN 1 TABLET: 5; 325 TABLET ORAL at 20:00

## 2023-01-10 RX ADMIN — ESCITALOPRAM OXALATE 20 MG: 10 TABLET ORAL at 21:51

## 2023-01-10 RX ADMIN — ASPIRIN 81 MG: 81 TABLET, FILM COATED ORAL at 08:33

## 2023-01-10 RX ADMIN — DIAZEPAM 5 MG: 5 TABLET ORAL at 20:00

## 2023-01-10 RX ADMIN — Medication 10 ML: at 21:54

## 2023-01-10 RX ADMIN — Medication 10 ML: at 21:53

## 2023-01-10 RX ADMIN — MELATONIN 5.25 MG: 3 TAB ORAL at 22:50

## 2023-01-10 RX ADMIN — GABAPENTIN 300 MG: 300 CAPSULE ORAL at 13:36

## 2023-01-10 RX ADMIN — ATORVASTATIN CALCIUM 80 MG: 40 TABLET, FILM COATED ORAL at 21:51

## 2023-01-10 RX ADMIN — CEFTRIAXONE SODIUM 1 G: 1 INJECTION, POWDER, FOR SOLUTION INTRAMUSCULAR; INTRAVENOUS at 13:36

## 2023-01-10 RX ADMIN — HYDROCODONE BITARTRATE AND ACETAMINOPHEN 1 TABLET: 5; 325 TABLET ORAL at 05:30

## 2023-01-10 NOTE — THERAPY TREATMENT NOTE
Acute Care - Physical Therapy Treatment Note  [facility]     Patient Name: Ave Ramirez  : 1950  MRN: [id number]  Today's Date: 1/10/2023      Visit Dx:     ICD-10-CM ICD-9-CM   1. Dysarthria  R47.1 784.51   2. Urinary tract infection with hematuria, site unspecified  N39.0 599.0    R31.9 599.70   3. Cerebrovascular accident (CVA) due to thrombosis of left middle cerebral artery (HCC)  I63.312 434.01   4. Other cerebrovascular vasospasm and vasoconstriction  I67.848 437.8   5. Impaired mobility and ADLs  Z74.09 V49.89    Z78.9    6. Impaired functional mobility, balance, gait, and endurance  Z74.09 V49.89   7. Atherosclerosis of native coronary artery of native heart, unspecified whether angina present  I25.10 414.01   8. History of PTCA  Z98.61 V45.82   9. Benign hypertension  I10 401.1   10. Suspected cerebrovascular accident (CVA)  R09.89 785.9   11. Cerebrovascular accident (CVA), unspecified mechanism (HCC)  I63.9 434.91     Patient Active Problem List   Diagnosis   • Hypothyroidism   • Atherosclerotic heart disease of native coronary artery without angina pectoris   • PVD (peripheral vascular disease) ([facility])   • Dyspnea on exertion   • CAD (coronary artery disease)   • Essential hypertension   • Palpitations   • Dizziness   • CVA (cerebral vascular accident) ([facility])   • Dysarthria     Past Medical History:   Diagnosis Date   • Acute angina ([facility])    • Acute bronchitis    • Acute sinusitis    • Anxiety    • Atherosclerotic heart disease of native coronary artery without angina pectoris    • Borderline glaucoma    • CAD (coronary artery disease)    • Cough    • Depression    • Disease of gallbladder     s/p lap jocelyne and normal ioc      • Encounter for gynecological examination (general) (routine) without abnormal findings    • Encounter for screening mammogram for malignant neoplasm of breast    • History of echocardiogram 2014    Normal LV systolic funciton with Ef of 55% with diastolic  relaxation abnormality of the left ventricle. Mild mitral regurgitation.   • Hyperlipidemia    • Hypertension    • Hypothyroidism    • Keratoconjunctivitis sicca (HCC)    • Myopia     refractive change OD    • Nausea and vomiting    • Nuclear cataract    • PVD (peripheral vascular disease) (HCC)    • SOB (shortness of breath)    • Upper respiratory infection      Past Surgical History:   Procedure Laterality Date   • CARDIAC CATHETERIZATION  2014    Medically manageable coronary artery disease in the previously placed stent in the diagonal coronary artery was patent with liminal irregularities. Normal LV systolic function with no wall motion abnormalities   • CARDIAC CATHETERIZATION     •  SECTION     • CHOLECYSTECTOMY  2012    intraoperative cholangiogram. Cholecystitis & cholelithiasis   • CORONARY STENT PLACEMENT      [facility]   • MAMMO BILATERAL  2016    DIAG MAMM, BILAT DIGITAL  (Medicare) (Other abnormal and inconclusive findings on diagnostic imaging of breast)    • MAMMO BILATERAL  2016    SCREENING MAMMOGRAPHY DIGITAL  (Medicare) (Encounter for screening mammogram for malignant neoplasm of breast)    • OTHER SURGICAL HISTORY  2003    OCT DISC NFL 45335 (Borderline glaucoma)    • TONSILLECTOMY       PT Assessment (last 12 hours)     PT Evaluation and Treatment     Row Name 01/10/23 1433          Physical Therapy Time and Intention    Subjective Information no complaints  -     Document Type therapy note (daily note)  -     Mode of Treatment physical therapy;individual therapy  -     Comment  present  -     Row Name 01/10/23 4122          General Information    Patient Profile Reviewed yes  -     Existing Precautions/Restrictions fall  expressive aphasia is frustrating for her; some fall risk  -     Row Name 01/10/23 1431          Pain    Pre/Posttreatment Pain Comment nsg states pt just recently  received pain meds  -     Row Name 01/10/23  1438          Cognition    Orientation Status (Cognition) person;place;situation;oriented to  -     Row Name 01/10/23 1438          Bed Mobility    Bed Mobility scooting/bridging  -     Scooting/Bridging [place] (Bed Mobility) contact guard;verbal cues  -     Supine-Sit [place] (Bed Mobility) standby assist  -     Sit-Supine [place] (Bed Mobility) contact guard  -     Assistive Device (Bed Mobility) bed rails;head of bed elevated  -     Row Name 01/10/23 1438          Sit-Stand Transfer    Sit-Stand [place] (Transfers) standby assist  -     Assistive Device (Sit-Stand Transfers) cane, straight  -     Row Name 01/10/23 1438          Stand-Sit Transfer    Stand-Sit [place] (Transfers) standby assist  -     Assistive Device (Stand-Sit Transfers) cane, straight  -     Row Name 01/10/23 1438          Gait/Stairs (Locomotion)    [place] Level (Gait) contact guard  -     Assistive Device (Gait) cane, straight  [facility]     Distance in Feet (Gait) 100'  -     Deviations/Abnormal Patterns (Gait) brenden decreased;gait speed decreased;stride length decreased  -     Comment, (Gait/Stairs) as pt fatigued, she began to drag/scuff  R LE  -     Row Name 01/10/23 1438          Safety Issues, Functional Mobility    Impairments Affecting Function (Mobility) balance;endurance/activity tolerance;shortness of breath;strength  -     Row Name 01/10/23 1438          Motor Skills    Therapeutic Exercise ankle;knee;hip  -     Row Name 01/10/23 1438          Hip (Therapeutic Exercise)    Hip (Therapeutic Exercise) AROM (active range of motion);AAROM (active assistive range of motion);isometric exercises  -     Hip AROM (Therapeutic Exercise) left;flexion;sitting;10 repetitions  -     Hip AAROM (Therapeutic Exercise) right;flexion;sitting;10 repetitions  -     Hip Isometrics (Therapeutic Exercise) bilateral;aDduction;sitting;10 repetitions  -     Row Name 01/10/23 1438           Knee (Therapeutic Exercise)    Knee (Therapeutic Exercise) AROM (active range of motion)  -     Knee AROM (Therapeutic Exercise) bilateral;LAQ (long arc quad);sitting;10 repetitions  -     Row Name 01/10/23 1438          Ankle (Therapeutic Exercise)    Ankle (Therapeutic Exercise) AROM (active range of motion)  -     Ankle AROM (Therapeutic Exercise) bilateral;dorsiflexion;plantarflexion;sitting;10 repetitions  -     Row Name 01/10/23 1438          Plan of Care Review    Plan of Care Reviewed With patient  -     Outcome Evaluation pt t/fers sup<>sit w/ SBA/CGA, sit<>stand w/ SBA w/ SC, pt ambulates ~100' w/ SC w/ CGA, as pt fatigued pt began to scoot/scuff R LE, pt w/ SOA after gt, O2 dropped from 94% RA  to 87%, recovered to 93% in ~1-2' of PLB in sitting, performed AROM/AAROM/iso ther ex  x15 reps at EOB  -     Row Name 01/10/23 1438          Vital Signs    Pretreatment Heart Rate (beats/min) 78  -     Intratreatment Heart Rate (beats/min) 84  -     Posttreatment Heart Rate (beats/min) 84  -     Pre SpO2 (%) 94  -     O2 Delivery Pre Treatment room air  -     Intra SpO2 (%) 87  -     O2 Delivery Intra Treatment room air  -JW     Post SpO2 (%) 93  -     O2 Delivery Post Treatment room air  -     Pre Patient Position Supine  -     Intra Patient Position Standing  -     Post Patient Position Supine  -     Row Name 01/10/23 1438          Positioning and Restraints    Pre-Treatment Position in bed  -     Post Treatment Position bed  -JW     In Bed fowlers;call light within reach;encouraged to call for assist;with family/caregiver  -     Row Name 01/10/23 1438          Bed Mobility Goal 1 (PT)    Activity/Assistive Device (Bed Mobility Goal 1, PT) bed mobility activities, all  -     [place] Level/Cues Needed (Bed Mobility Goal 1, PT) modified independence;independent  -     Time Frame (Bed Mobility Goal 1, PT) by discharge  -     Progress/Outcomes (Bed Mobility Goal 1,  PT) goal ongoing  -     Row Name 01/10/23 1438          Transfer Goal 1 (PT)    Activity/Assistive Device (Transfer Goal 1, PT) bed-to-chair/chair-to-bed;toilet  -JW     [place] Level/Cues Needed (Transfer Goal 1, PT) modified independence  -JW     Time Frame (Transfer Goal 1, PT) by discharge  -JW     Progress/Outcome (Transfer Goal 1, PT) goal ongoing  -     Row Name 01/10/23 1438          Gait Training Goal 1 (PT)    Activity/Assistive Device (Gait Training Goal 1, PT) gait (walking locomotion);assistive device use  -JW     [place] Level (Gait Training Goal 1, PT) modified independence  -JW     Distance (Gait Training Goal 1, PT) 100 ft or more per trip w/ VSS and no SOA  -JW     Time Frame (Gait Training Goal 1, PT) by discharge  -JW     Progress/Outcome (Gait Training Goal 1, PT) goal not met  -     Row Name 01/10/23 1438          Stairs Goal 1 (PT)    Activity/Assistive Device (Stairs Goal 1, PT) ascending stairs;descending stairs  -JW     [place] Level/Cues Needed (Stairs Goal 1, PT) modified independence;supervision required  -JW     Number of Stairs (Stairs Goal 1, PT) 2  -JW     Time Frame (Stairs Goal 1, PT) by discharge  -JW     Progress/Outcome (Stairs Goal 1, PT) goal not met  -     Row Name 01/10/23 1438          Problem Specific Goal 1 (PT)    Problem Specific Goal 1 (PT) pt will demo indep sit to stand x 3 as ther ex w/out LOB or SOA  -JW     Time Frame (Problem Specific Goal 1, PT) 1 week  -JW     Progress/Outcome (Problem Specific Goal 1, PT) goal not met  -           User Key  (r) = Recorded By, (t) = Taken By, (c) = Cosigned By    Initials Name Provider Type    Robyn Alejo, PTA Physical Therapist Assistant                Physical Therapy Education     Title: PT OT SLP Therapies (In Progress)     Topic: Physical Therapy (In Progress)     Point: Mobility training (Done)     Learning Progress Summary           Patient Acceptance, E,D, VU,NR by VINNY at 1/9/2023  1417    Comment: POC   Family Acceptance, E,D, VU,NR by  at 1/9/2023 1417    Comment: POC                   Point: Home exercise program (Not Started)     Learner Progress:  Not documented in this visit.          Point: Body mechanics (Not Started)     Learner Progress:  Not documented in this visit.          Point: Precautions (Done)     Learning Progress Summary           Patient Acceptance, E,D, VU,NR by  at 1/9/2023 1417    Comment: POC   Family Acceptance, E,D, VU,NR by  at 1/9/2023 1417    Comment: POC                               User Key     Initials Effective Dates Name Provider Type Discipline     06/16/21 -  Violeta Su, PT Physical Therapist PT              PT Recommendation and Plan     Plan of Care Reviewed With: patient  Outcome Evaluation: pt t/fers sup<>sit w/ SBA/CGA, sit<>stand w/ SBA w/ SC, pt ambulates ~100' w/ SC w/ CGA, as pt fatigued pt began to scoot/scuff R LE, pt w/ SOA after gt, O2 dropped from 94% RA  to 87%, recovered to 93% in ~1-2' of PLB in sitting, performed AROM/AAROM/iso ther ex  x15 reps at EOB       Time Calculation:    PT Charges     Row Name 01/10/23 1438             Time Calculation    Start Time 1438  -      Stop Time 1501  -      Time Calculation (min) 23 min  -      PT Received On 01/10/23  -         Time Calculation- PT    Total Timed Code Minutes- PT 23 minute(s)  -            User Key  (r) = Recorded By, (t) = Taken By, (c) = Cosigned By    Initials Name Provider Type     Robyn Bee PTA Physical Therapist Assistant              Therapy Charges for Today     Code Description Service Date Service Provider Modifiers Qty    81401524753 HC PT THER PROC EA 15 MIN 1/10/2023 Robyn Bee, PTA GP 1    73799035772 HC GAIT TRAINING EA 15 MIN 1/10/2023 Robyn Bee PTA GP 1          PT G-Codes  AM-PAC 6 Clicks Score (PT): 15    Robyn Bee PTA  1/10/2023

## 2023-01-10 NOTE — PLAN OF CARE
Goal Outcome Evaluation:  Plan of Care Reviewed With: patient           Outcome Evaluation: pt t/fers sup<>sit w/ SBA/CGA, sit<>stand w/ SBA w/ SC, pt ambulates ~100' w/ SC w/ CGA, as pt fatigued pt began to scoot/scuff R LE, pt w/ SOA after gt, O2 dropped from 94% RA  to 87%, recovered to 93% in ~1-2' of PLB in sitting, performed AROM/AAROM/iso ther ex  x15 reps at EOB

## 2023-01-10 NOTE — PROGRESS NOTES
[facility] Medicine Services  INPATIENT PROGRESS NOTE    Length of Stay: 0  Date of Admission: 1/8/2023  Primary Care Physician: Anali Sheppard DO    Subjective   (S) shortness of breath upon walking; no fever, no nausea, no vomiting    Review of Systems     All pertinent negatives and positives are as above. All other systems have been reviewed and are negative unless otherwise stated.     Prior to Admission medications    Medication Sig Start Date End Date Taking? Authorizing Provider   aspirin 81 MG EC tablet Take 1 tablet by mouth Daily. 11/12/19  Yes Darrius Molina MD   atorvastatin (LIPITOR) 40 MG tablet Take 40 mg by mouth Daily. 4/5/16  Yes Farideh Lindsey MD   buPROPion SR (WELLBUTRIN SR) 150 MG 12 hr tablet Take 150 mg by mouth Daily. 6/3/22  Yes Anali Sheppard DO   Cholecalciferol (VITAMIN D3) 2000 UNITS tablet Take 1 tablet by mouth Daily. 4/5/16  Yes Farideh Lindsey MD   clopidogrel (PLAVIX) 75 MG tablet Take 75 mg by mouth Daily. 3/29/22  Yes Anali Sheppard DO   diazepam (VALIUM) 5 MG tablet Take 5 mg by mouth At Night As Needed for Anxiety. 4/11/16  Yes Farideh Lindsey MD   docusate sodium (COLACE) 100 MG capsule Take 100 mg by mouth Daily As Needed. 4/11/16  Yes Farideh Lindsey MD   Fluticasone Furoate-Vilanterol (Breo Ellipta) 200-25 MCG/INH inhaler INHALE 1 PUFF BY MOUTH DAILY 12/7/20 1/8/23 Yes Farideh Lindsey MD   furosemide (LASIX) 20 MG tablet Take 20 mg by mouth Daily. 4/5/16  Yes Farideh Lindsey MD   gabapentin (NEURONTIN) 100 MG capsule Take 400 mg by mouth 3 (Three) Times a Day.   Yes Farideh Lindsey MD   HYDROcodone-acetaminophen (NORCO) 5-325 MG per tablet Take 1 tablet by mouth Every 6 (Six) Hours As Needed for Moderate Pain .   Yes Farideh Lindsey MD   hydrOXYzine (ATARAX) 25 MG tablet Take 25 mg by mouth 3 (Three) Times a Day As Needed for Itching.   Yes Farideh Lindsey MD    ISOSORBIDE MONONITRATE PO Take 30 mg by mouth Daily. 4/12/22  Yes Anali Sheppard, DO   levocetirizine (XYZAL) 5 MG tablet Take 5 mg by mouth Daily. 5/18/22  Yes Farideh Lindsey MD   levothyroxine (SYNTHROID, LEVOTHROID) 75 MCG tablet Take 100 mcg by mouth Daily. 4/11/06  Yes Farideh Lindsey MD   omeprazole (priLOSEC) 40 MG capsule Take 40 mg by mouth Daily.   Yes Farideh Lindsey MD   promethazine (PHENERGAN) 25 MG tablet Take 25 mg by mouth Every 6 (Six) Hours As Needed for Nausea. 8/24/21  Yes Farideh Lindsey MD   tiZANidine (ZANAFLEX) 4 MG tablet Take 4 mg by mouth At Night As Needed for Muscle Spasms. 1 and 1/2 tablet by mouth 3 times a day   Yes Farideh Lindsey MD   Albuterol Sulfate (VENTOLIN HFA IN) Take 2 puffs by mouth Every 4 (Four) Hours As Needed (weezing). 4/27/22   Anali Sheppard DO   DIPHENHYD-LIDOCAINE-NYSTATIN MT Apply  to the mouth or throat 4 (Four) Times a Day As Needed.    Farideh Lindsey MD   escitalopram (LEXAPRO) 20 MG tablet Take 20 mg by mouth Every Night. 4/11/16   Farideh Lindsey MD   losartan (COZAAR) 50 MG tablet Take 1 tablet by mouth Daily. Take in PM 12/6/19   Darrius Molina MD   metoprolol succinate XL (TOPROL-XL) 50 MG 24 hr tablet Take 50 mg by mouth Every Night. 4/11/16   Farideh Lindsey MD       aspirin, 81 mg, Oral, Daily  atorvastatin, 80 mg, Oral, Nightly  buPROPion SR, 150 mg, Oral, Daily  cefTRIAXone, 1 g, Intravenous, Q24H  cefTRIAXone, 1 g, Intravenous, Q24H  clopidogrel, 75 mg, Oral, Daily  escitalopram, 20 mg, Oral, Nightly  gabapentin, 300 mg, Oral, Q8H  pantoprazole, 40 mg, Oral, QAM  sodium chloride, 10 mL, Intravenous, Q12H  sodium chloride, 10 mL, Intravenous, Q12H           Objective    Temp:  [97.6 °F (36.4 °C)-98.1 °F (36.7 °C)] 97.6 °F (36.4 °C)  Heart Rate:  [80-95] 83  Resp:  [15-18] 16  BP: (134-178)/(60-88) 148/88    Physical Exam  HENT:      Head: Normocephalic.      Nose: Nose normal.       Mouth/Throat:      Mouth: Mucous membranes are moist.   Eyes:      Extraocular Movements: Extraocular movements intact.   Cardiovascular:      Rate and Rhythm: Normal rate and regular rhythm.      Pulses: Normal pulses.      Heart sounds: Normal heart sounds.   Pulmonary:      Effort: Pulmonary effort is normal.      Breath sounds: Rales present.   Abdominal:      General: Bowel sounds are normal.   Musculoskeletal:      Cervical back: Normal range of motion.      Comments: Right sided weakness, old   Skin:     General: Skin is warm.   Neurological:      Mental Status: She is alert. Mental status is at baseline.   Psychiatric:         Mood and Affect: Mood normal.           Results Review:  I have reviewed the labs, radiology results, and diagnostic studies.    Laboratory Data:   Results from last 7 days   Lab Units 01/09/23  0409 01/08/23  1010   SODIUM mmol/L 137 135*   POTASSIUM mmol/L 4.1 4.0   CHLORIDE mmol/L 100 99   CO2 mmol/L 27.0 24.0   BUN mg/dL 9 8   CREATININE mg/dL 0.71 0.82   GLUCOSE mg/dL 119* 152*   CALCIUM mg/dL 9.0 9.4   BILIRUBIN mg/dL 0.5 0.6   ALK PHOS U/L 120* 143*   ALT (SGPT) U/L 23 26   AST (SGOT) U/L 23 25   ANION GAP mmol/L 10.0 12.0     Estimated Creatinine Clearance: 66 mL/min (by C-G formula based on SCr of 0.71 mg/dL).  Results from last 7 days   Lab Units 01/09/23  0409   MAGNESIUM mg/dL 2.0   PHOSPHORUS mg/dL 4.7*         Results from last 7 days   Lab Units 01/09/23  0409 01/08/23  1010   WBC 10*3/mm3 7.21 8.44   HEMOGLOBIN g/dL 12.6 13.9   HEMATOCRIT % 38.9 42.8   PLATELETS 10*3/mm3 178 185     Results from last 7 days   Lab Units 01/08/23  1010   INR  1.06       Culture Data:   Blood Culture   Date Value Ref Range Status   01/08/2023 No growth at 24 hours  Preliminary   01/08/2023 No growth at 24 hours  Preliminary     Urine Culture   Date Value Ref Range Status   01/08/2023 >100,000 CFU/mL Escherichia coli (A)  Preliminary     No results found for: RESPCX  No results found for:  WOUNDCX  No results found for: STOOLCX  No components found for: BODYFLD    Radiology Data:   Imaging Results (Last 24 Hours)     Procedure Component Value Units Date/Time    MRI Brain With & Without Contrast [[id number]] Collected: 01/09/23 0910     Updated: 01/09/23 1250    Narrative:      PROCEDURE: MR BRAIN WITHOUT THEN WITH IV CONTRAST    INDICATION:  Neuro deficit, acute, stroke suspected, R47.1  Dysarthria and anarthria N39.0 Urinary tract infection, site not  specified R31.9 Hematuria, unspecified I63.312 Cerebral  infarction due to thrombosis of left middle cerebral artery  I67.848 Other cerebrovascular vasospasm and vasoconstriction    COMPARISON: CT head January 8    TECHNIQUE: Multiplanar, multisequence MR of the brain was  performed with and without IV contrast.    FINDINGS:    Brain: No restricted diffusion. Encephalomalacia and gliosis left  MCA territory consistent with old infarct. No susceptibility  artifact. No hemorrhage or midline shift. Increased T2/FLAIR  signal in the periventricular and subcortical white matter is  nonspecific though likely related to chronic small vessel  ischemic changes. Cerebral and cerebellar atrophy.    Ventricular system: Ex vacuo dilatation of the left lateral  ventricle.    Basal cisterns: Normal    Cerebellum: Left cerebellar atrophy.    Brainstem: Unremarkable..    Calvarium: No visualized fracture..     Vascular system: Normal.     Paranasal sinuses and mastoid air cells: Clear.     Visualized orbits: Normal    Sella: Normal    Soft tissues: Normal    Marrow: Normal      Impression:        No evidence of acute ischemia.    Old left MCA infarct    Atrophy    Chronic small vessel ischemic changes.    Electronically signed by:  Ghassan Whitney DO  1/9/2023 12:48 PM  CST Workstation: [id number]          I have reviewed the patient's current medications.     Assessment/Plan   Dysarthria      In the setting of previous CVA; MRI negative for an acute CVA; neuro  recommended continue with DAPT and statin     Blood pressure is well controlled and good O2 sat    UTI with hematuria by Gram negative     Continue with rocephin    Lower abdominal pain     Due to above?     CT abdomen/pelvis would be ordered    Shortness of breath     Check procalcitonin, chest x ray, blood work am    Chronic medical problems     CAD      Essential hypertension     Hypothyroidism     Old left MCA CVA with right hemiparesis and expressive aphasia    Medical Decision Making  Number and Complexity of problems: one major and one minor  Differential Diagnosis: seizures    Conditions and Status:        Condition is improving.     MDM Data  External documents reviewed: previous medical records  My EKG interpretation: none  My plain film interpretation: none  Tests considered but not ordered: none     Decision rules/scores evaluated (example [id number], Wells, etc): N/A     Discussed with: patient and her      Treatment Plan  Continue with current antibiotics    Care Planning  Shared decision making: patient and her   Code status and discussions: full code    Disposition  Social Determinants of Health that impact treatment or disposition: clinical status  I expect the patient to be discharged to home with HHC vs SNF in 1-2 days    I confirmed that the patient's Advance Care Plan is present, code status is documented, or surrogate decision maker is listed in the patient's medical record.     Chaparro Ang MD

## 2023-01-10 NOTE — THERAPY TREATMENT NOTE
Acute Care - Speech Language Pathology Initial Evaluation  [facility]     Patient Name: Ave Ramirez  : 1950  MRN: [id number]  Today's Date: 1/10/2023               Admit Date: 2023     Visit Dx:    ICD-10-CM ICD-9-CM   1. Dysarthria  R47.1 784.51   2. Urinary tract infection with hematuria, site unspecified  N39.0 599.0    R31.9 599.70   3. Cerebrovascular accident (CVA) due to thrombosis of left middle cerebral artery (HCC)  I63.312 434.01   4. Other cerebrovascular vasospasm and vasoconstriction  I67.848 437.8   5. Impaired mobility and ADLs  Z74.09 V49.89    Z78.9    6. Impaired functional mobility, balance, gait, and endurance  Z74.09 V49.89   7. Atherosclerosis of native coronary artery of native heart, unspecified whether angina present  I25.10 414.01   8. History of PTCA  Z98.61 V45.82   9. Benign hypertension  I10 401.1   10. Suspected cerebrovascular accident (CVA)  R09.89 785.9     Patient Active Problem List   Diagnosis   • Hypothyroidism   • Atherosclerotic heart disease of native coronary artery without angina pectoris   • PVD (peripheral vascular disease) ([facility])   • Dyspnea on exertion   • CAD (coronary artery disease)   • Essential hypertension   • Palpitations   • Dizziness   • CVA (cerebral vascular accident) ([facility])   • Dysarthria     Past Medical History:   Diagnosis Date   • Acute angina ([facility])    • Acute bronchitis    • Acute sinusitis    • Anxiety    • Atherosclerotic heart disease of native coronary artery without angina pectoris    • Borderline glaucoma    • CAD (coronary artery disease)    • Cough    • Depression    • Disease of gallbladder     s/p lap jocelyne and normal ioc      • Encounter for gynecological examination (general) (routine) without abnormal findings    • Encounter for screening mammogram for malignant neoplasm of breast    • History of echocardiogram 2014    Normal LV systolic funciton with Ef of 55% with diastolic relaxation abnormality of the left  ventricle. Mild mitral regurgitation.   • Hyperlipidemia    • Hypertension    • Hypothyroidism    • Keratoconjunctivitis sicca (HCC)    • Myopia     refractive change OD    • Nausea and vomiting    • Nuclear cataract    • PVD (peripheral vascular disease) (HCC)    • SOB (shortness of breath)    • Upper respiratory infection      Past Surgical History:   Procedure Laterality Date   • CARDIAC CATHETERIZATION  2014    Medically manageable coronary artery disease in the previously placed stent in the diagonal coronary artery was patent with liminal irregularities. Normal LV systolic function with no wall motion abnormalities   • CARDIAC CATHETERIZATION     •  SECTION     • CHOLECYSTECTOMY  2012    intraoperative cholangiogram. Cholecystitis & cholelithiasis   • CORONARY STENT PLACEMENT      [facility]   • MAMMO BILATERAL  2016    DIAG MAMM, BILAT DIGITAL  (Medicare) (Other abnormal and inconclusive findings on diagnostic imaging of breast)    • MAMMO BILATERAL  2016    SCREENING MAMMOGRAPHY DIGITAL  (Medicare) (Encounter for screening mammogram for malignant neoplasm of breast)    • OTHER SURGICAL HISTORY  2003    OCT DISC [facility] [id number] (Borderline glaucoma)    • TONSILLECTOMY         SLP Recommendation and Plan  SLP Diagnosis: dysarthria, aphasia (01/10/23 1010)           [facility] Criteria for Skilled Therapy Interventions Met: yes (01/10/23 1010)           Predicted Duration Therapy Intervention (Days): until discharge (01/10/23 1010)     Daily Summary of Progress (SLP): progress toward functional goals is good (01/10/23 1010)           Treatment Assessment (SLP): dysarthria, aphasia (01/10/23 1010)  Treatment Assessment Comments (SLP): Speech: Pt with decreased speech intelligbility due to dysarthria and aphasia. Pt's speech is very limited due to decreased over-articulation, decreased loudness, and decreased functional communication. SLP encouragd  pt to use communication board  and/or alphabet. (01/10/23 1010)  Plan for Continued Treatment (SLP): continue treatment per plan of care (01/10/23 1010)  Plan of Care Reviewed With: patient (01/10/23 1257)  Progress: improving (01/10/23 1257)  Outcome Evaluation: Speech: Pt with decreased speech intelligbility due to dysarthria and aphasia. Pt's speech is very limited due to decreased over-articulation, decreased loudness, and decreased functional communication. SLP encouragd  pt to use communication board and/or alphabet. Pt to continue to benefit from skilled ST. (01/10/23 1257)      SLP EVALUATION (last 72 hours)     SLP SLC Evaluation     Row Name 01/10/23 1010 01/09/23 1115                Communication Assessment/Intervention    Document Type evaluation  1015  -EK evaluation  1015  -EK       Subjective Information no complaints  -EK no complaints  -EK       Patient Observations alert;cooperative;agree to therapy  -EK alert;cooperative;agree to therapy  -EK       Patient/Family/Caregiver Comments/Observations   -EK   -EK       Patient Effort good  -EK good  -EK          General Information    Patient Profile Reviewed yes  -EK yes  -EK       Precautions/Limitations, Vision WFL with corrective lenses  -EK WFL with corrective lenses  -EK       Precautions/Limitations, Hearing WFL  -EK WFL  -EK       Prior Level of Function-Communication WFL  -EK WFL  -EK       Plans/Goals Discussed with spouse/S.O.;patient  -EK spouse/S.O.;patient  -EK          Pain    Additional Documentation -- Pain Scale: Numbers Pre/Post-Treatment (Group)  -EK          Pain Scale: Numbers Pre/Post-Treatment    Pretreatment Pain Rating 0/10 - no pain  -EK 0/10 - no pain  -EK       Posttreatment Pain Rating 0/10 - no pain  -EK 0/10 - no pain  -EK          Comprehension Assessment/Intervention    Comprehension Assessment/Intervention -- Auditory Comprehension  -EK          Auditory Comprehension Assessment/Intervention    Auditory Comprehension (Communication) WFL   -EK WFL  -EK       Able to Identify Objects/Pictures (Communication) WFL  -EK WFL  -EK       Answers Questions (Communication) WFL  -EK WFL  -EK       Able to Follow Commands (Communication) WFL  -EK WFL  -EK          Expression Assessment/Intervention    Expression Assessment/Intervention -- verbal expression  -EK          Verbal Expression Assessment/Intervention    Automatic Speech (Communication) moderate impairment  -EK moderate impairment  -EK       Repetition moderate impairment  -EK moderate impairment  -EK       Conversational Discourse/Fluency moderate impairment  -EK moderate impairment  -EK          Oral Motor Structure and Function    Dentition Assessment upper dentures/partial in place;lower dentures/partial in place  -EK upper dentures/partial in place;lower dentures/partial in place  -EK          Oral Musculature and Cranial Nerve Assessment    Oral Motor General Assessment generalized oral motor weakness  -EK generalized oral motor weakness  -EK          Motor Speech Assessment/Intervention    Motor Speech Function moderate impairment;severe impairment  -EK moderate impairment;severe impairment  -EK       Characteristics Consistent with Dysarthria slurred speech;decreased intensity  -EK slurred speech;decreased intensity  -EK       Initiation of Phonation (Communication) moderate impairment  -EK moderate impairment  -EK       Automatic Speech (Communication) moderate impairment  -EK moderate impairment  -EK       Verbal Repetition (Communication) moderate impairment  -EK moderate impairment  -EK          SLP Evaluation Clinical Impressions    SLP Diagnosis dysarthria;aphasia  -EK --       Rehab Potential/Prognosis good  -EK --       SLC Criteria for Skilled Therapy Interventions Met yes  -EK --          SLP Treatment Clinical Impressions    Treatment Assessment (SLP) dysarthria;aphasia  -EK --       Treatment Assessment Comments (SLP) Speech: Pt with decreased speech intelligbility due to  dysarthria and aphasia. Pt's speech is very limited due to decreased over-articulation, decreased loudness, and decreased functional communication. SLP encouragd  pt to use communication board and/or alphabet.  -EK --       Daily Summary of Progress (SLP) progress toward functional goals is good  -EK --       Plan for Continued Treatment (SLP) continue treatment per plan of care  -EK --       Care Plan Review evaluation/treatment results reviewed  -EK evaluation/treatment results reviewed  -EK          Recommendations    Therapy Frequency (SLP SLC) 3 days per week;5 days per week  -EK --       Predicted Duration Therapy Intervention (Days) until discharge  -EK --          Word Retrieval Skills Goal 1 (SLP)    Improve Word Retrieval Skills By Goal 1 (SLP) completing automatic speech task, counting;completing automatic speech task, alphabet;90%;with minimal cues (75-90%)  -EK completing automatic speech task, counting;completing automatic speech task, alphabet;90%;with minimal cues (75-90%)  -EK       Time Frame (Word Retrieval Goal 1, SLP) by discharge  -EK by discharge  -EK       Progress (Word Retrieval Skills Goal 1, SLP) 80%;with moderate cues (50-74%)  -EK --       Progress/Outcomes (Word Retrieval Goal 1, SLP) goal not met  -EK new goal  -EK          Word Retrieval Skills Goal 2 (SLP)    Improve Word Retrieval Skills By Goal 2 (SLP) repeating words;90%;with minimal cues (75-90%)  -EK repeating words;90%;with minimal cues (75-90%)  -EK       Time Frame (Word Retrieval Goal 2, SLP) by discharge  -EK by discharge  -EK       Progress (Word Retrieval Skills Goal 2, SLP) 80%;with moderate cues (50-74%)  -EK --       Progress/Outcomes (Word Retrieval Goal 2, SLP) goal not met  -EK new goal  -EK          Patient Will Implement Strategies Goal 1 (SLP)    Implement Strategies of Goal 1 (SLP) 90%;with minimal cues (75-90%);pointing to pictures  -EK 90%;with minimal cues (75-90%);pointing to pictures  -EK       Time Frame  (Strategy Implementation Goal 1, SLP) by discharge  -EK by discharge  -EK       Progress (Strategy Implementation Goal 1, SLP) 50%  -EK --       Progress/Outcomes (Strategy Implementation Goal 1, SLP) goal not met  -EK new goal  -EK       Comment (Strategy Implementation Goal 1, SLP) SLP really focused on over-articulation, increased loudness, and focus on initial sound.  -EK --          Patient Will Implement Strategies Goal 2 (SLP)    Implement Strategies of Goal 2 (SLP) using alphabet/word board;90%;with minimal cues (75-90%)  -EK using alphabet/word board;90%;with minimal cues (75-90%)  -EK       Time Frame (Strategy Implementation Goal 2, SLP) by discharge  -EK by discharge  -EK       Progress (Strategy Implementation Goal 2, SLP) 70%  -EK --       Progress/Outcomes (Strategy Implementation Goal 2, SLP) goal not met  -EK new goal  -EK          Articulation Goal 1 (SLP)    Improve Articulation Goal 1 (SLP) by over-articulating at phrase level;90%;with minimal cues (75-90%)  -EK by over-articulating at phrase level;90%;with minimal cues (75-90%)  -EK       Time Frame (Articulation Goal 1, SLP) by discharge  -EK by discharge  -EK       Progress (Articulation Goal 1, SLP) 70%  -EK --       Progress/Outcomes (Articulation Goal 1, SLP) goal not met  -EK new goal  -EK          Articulation Goal 2 (SLP)    Improve Articulation Goal 2 (SLP) of specific sounds in connected speech;by over-articulating at word level;90%;with minimal cues (75-90%)  -EK of specific sounds in connected speech;by over-articulating at word level;90%;with minimal cues (75-90%)  -EK       Time Frame (Articulation Goal 2, SLP) by discharge  -EK by discharge  -EK       Progress (Articulation Goal 2, SLP) 70%  -EK --       Progress/Outcomes (Articulation Goal 2, SLP) goal not met  -EK new goal  -EK             User Key  (r) = Recorded By, (t) = Taken By, (c) = Cosigned By    Initials Name Effective Dates    May Vital, CCC-SLP  06/16/21 -                    EDUCATION  The patient has been educated in the following areas:     Cognitive Impairment Communication Impairment.           SLP GOALS     Row Name 01/10/23 1010 01/09/23 1115          Word Retrieval Skills Goal 1 (SLP)    Improve Word Retrieval Skills By Goal 1 (SLP) completing automatic speech task, counting;completing automatic speech task, alphabet;90%;with minimal cues (75-90%)  -EK completing automatic speech task, counting;completing automatic speech task, alphabet;90%;with minimal cues (75-90%)  -EK     Time Frame (Word Retrieval Goal 1, SLP) by discharge  -EK by discharge  -EK     Progress (Word Retrieval Skills Goal 1, SLP) 80%;with moderate cues (50-74%)  -EK --     Progress/Outcomes (Word Retrieval Goal 1, SLP) goal not met  -EK new goal  -EK        Word Retrieval Skills Goal 2 (SLP)    Improve Word Retrieval Skills By Goal 2 (SLP) repeating words;90%;with minimal cues (75-90%)  -EK repeating words;90%;with minimal cues (75-90%)  -EK     Time Frame (Word Retrieval Goal 2, SLP) by discharge  -EK by discharge  -EK     Progress (Word Retrieval Skills Goal 2, SLP) 80%;with moderate cues (50-74%)  -EK --     Progress/Outcomes (Word Retrieval Goal 2, SLP) goal not met  -EK new goal  -EK        Patient Will Implement Strategies Goal 1 (SLP)    Implement Strategies of Goal 1 (SLP) 90%;with minimal cues (75-90%);pointing to pictures  -EK 90%;with minimal cues (75-90%);pointing to pictures  -EK     Time Frame (Strategy Implementation Goal 1, SLP) by discharge  -EK by discharge  -EK     Progress (Strategy Implementation Goal 1, SLP) 50%  -EK --     Progress/Outcomes (Strategy Implementation Goal 1, SLP) goal not met  -EK new goal  -EK     Comment (Strategy Implementation Goal 1, SLP) SLP really focused on over-articulation, increased loudness, and focus on initial sound.  -EK --        Patient Will Implement Strategies Goal 2 (SLP)    Implement Strategies of Goal 2 (SLP) using  alphabet/word board;90%;with minimal cues (75-90%)  -EK using alphabet/word board;90%;with minimal cues (75-90%)  -EK     Time Frame (Strategy Implementation Goal 2, SLP) by discharge  -EK by discharge  -EK     Progress (Strategy Implementation Goal 2, SLP) 70%  -EK --     Progress/Outcomes (Strategy Implementation Goal 2, SLP) goal not met  -EK new goal  -EK        Articulation Goal 1 (SLP)    Improve Articulation Goal 1 (SLP) by over-articulating at phrase level;90%;with minimal cues (75-90%)  -EK by over-articulating at phrase level;90%;with minimal cues (75-90%)  -EK     Time Frame (Articulation Goal 1, SLP) by discharge  -EK by discharge  -EK     Progress (Articulation Goal 1, SLP) 70%  -EK --     Progress/Outcomes (Articulation Goal 1, SLP) goal not met  -EK new goal  -EK        Articulation Goal 2 (SLP)    Improve Articulation Goal 2 (SLP) of specific sounds in connected speech;by over-articulating at word level;90%;with minimal cues (75-90%)  -EK of specific sounds in connected speech;by over-articulating at word level;90%;with minimal cues (75-90%)  -EK     Time Frame (Articulation Goal 2, SLP) by discharge  -EK by discharge  -EK     Progress (Articulation Goal 2, SLP) 70%  -EK --     Progress/Outcomes (Articulation Goal 2, SLP) goal not met  -EK new goal  -EK           User Key  (r) = Recorded By, (t) = Taken By, (c) = Cosigned By    Initials Name Provider Type    May Vital CCC-SLP Speech and Language Pathologist                        Time Calculation:      Time Calculation- SLP     Row Name 01/10/23 1258             Time Calculation- SLP    SLP Start Time 1010  -EK      SLP Stop Time 1052  -EK      SLP Time Calculation (min) 42 min  -EK      Total Timed Code Minutes- SLP 42 minute(s)  -EK      SLP Received On 01/10/23  -EK            User Key  (r) = Recorded By, (t) = Taken By, (c) = Cosigned By    Initials Name Provider Type    May Vital CCC-[facility] Speech and  Language Pathologist                Therapy Charges for Today     Code Description Service Date Service Provider Modifiers Qty    [id number] [facility] EVAL SPEECH AND PROD W LANG  3 1/9/2023 May Solorzano CCC-SLP GN 1    [id number] [facility] TREATMENT SPEECH 3 1/10/2023 May Solorzano CCC-SLP GN 1                     CRISTIANE Lee  1/10/2023

## 2023-01-10 NOTE — PLAN OF CARE
Problem: Adult Inpatient Plan of Care  Goal: Plan of Care Review  Outcome: Ongoing, Progressing  Flowsheets  Taken 1/10/2023 1427 by Avril Cottrell RN  Outcome Evaluation: IV abx infused as ordered.  No new concerns.  Up with 1 assist when ambulating.   at bedside helping pt to and from bathroom.  Hoping to discharge home tomorrow with spouse.  Taken 1/10/2023 1257 by May Solorzano CCC-SLP  Progress: improving  Plan of Care Reviewed With: patient   Goal Outcome Evaluation:              Outcome Evaluation: IV abx infused as ordered.  No new concerns.  Up with 1 assist when ambulating.   at bedside helping pt to and from bathroom.  Hoping to discharge home tomorrow with spouse.

## 2023-01-10 NOTE — SIGNIFICANT NOTE
01/10/23 1546   OTHER   Discipline occupational therapy assistant   Rehab Time/Intention   Session Not Performed patient/family declined treatment  (Pt deferred OT this pm. OT will check back tomorrow.)

## 2023-01-10 NOTE — DISCHARGE PLACEMENT REQUEST
"Anticipating d/c home on 1/11/2023.  Thanks,   Sunny FOSTER RN,  [phone].      Ave Bernard (73 y.o. Female)     Date of Birth   1950    Social Security Number       Address   [address] [place] [address]    Home Phone   [phone]    MRN   [id number]       Islam   Religious    Marital Status                               Admission Date   1/8/23    Admission Type   Emergency    Admitting Provider   Chaparro Ang MD    Attending Provider   Yani Garrison MD    Department, Room/Bed   [address], 358/1       Discharge Date       Discharge Disposition       Discharge Destination                               Attending Provider: Yani Garrison MD    Allergies: Penicillins, Lisinopril    Isolation: None   Infection: COVID (rule out) (01/09/23)   Code Status: CPR    Ht: 148.6 cm (58.5\")   Wt: 77.1 kg (170 lb)    Admission Cmt: None   Principal Problem: Dysarthria [R47.1]                 Active Insurance as of 1/8/2023     Primary Coverage     Payor Plan Insurance Group Employer/Plan Group    MEDICARE MEDICARE A & B      Payor Plan Address Payor Plan Phone Number Payor Plan Fax Number Effective Dates    [address] [phone]  1/1/2016 - None Entered    [facility] [id number]       Subscriber Name Subscriber Birth Date Member ID       AVE BERNARD 1950 [id number]           Secondary Coverage     Payor Plan Insurance Group Employer/Plan Group    [employer] [place] [employer] [place] [id number]     Payor Plan Address Payor Plan Phone Number Payor Plan Fax Number Effective Dates    [address] [phone]  1/1/2016 - None Entered    [place] [address]       Subscriber Name Subscriber Birth Date Member ID       AVE BERNARD 1950 [id number]                 Emergency Contacts      (Rel.) Home Phone Work Phone Mobile Phone    JamesKaveh (Spouse) [phone] -- [phone]    jamesjorden (Son) " [phone] -- [phone]           [address] [address]  Phone:  [phone]  Fax:  [phone] Date: Zak 10, 2023      Ambulatory Referral to Home Health (Hospital)     Patient:  Ave Ramirez MRN:  [id number]   [address]  [place] [facility] [address] :  1950  SSN:    Phone: [phone] Sex:  F      INSURANCE PAYOR PLAN GROUP # SUBSCRIBER ID   Primary:  Secondary:    MEDICARE  [employer] [place] [id number]  [id number]    [id number]  [id number]      Referring Provider Information:  DEEPA TAVERAS Phone: [phone] Fax: [phone]       Referral Information:   # Visits:  999 Referral Type: Home Health [42]   Urgency:  Routine Referral Reason: Specialty Services Required   Start Date: Zak 10, 2023 End Date:  To be determined by Insurer   Diagnosis: Cerebrovascular accident (CVA), unspecified mechanism (HCC) (I63.9 [ICD-10-CM] 434.91 [ICD-9-CM])      Refer to Dept:   Refer to Provider:   Refer to Provider Phone:   Refer to Facility:       Face to Face Visit Date: 1/10/2023  Follow-up provider for Plan of Care? I treated the patient in an acute care facility and will not continue treatment after discharge.  Follow-up provider: PITER MYRICK [[id number]]  Reason/Clinical Findings: post hospitalizations  Describe mobility limitations that make leaving home difficult: post hospitalization  Nursing/Therapeutic Services Requested: Physical Therapy  Nursing/Therapeutic Services Requested: Occupational Therapy  Nursing/Therapeutic Services Requested: Speech Therapy  PT orders: Therapeutic exercise  PT orders: Transfer training  PT orders: Strengthening  Occupational orders: Activities of daily living  Occupational orders: Energy conservation  Occupational orders: Strengthening  Occupational orders: Cognition  Occupational orders: Fine motor  SLP orders: Articulation  SLP orders: Language  SLP orders: Cognition  SLP orders:  Voice  Frequency: 1 Week 1     This document serves as a request of services and does not constitute Insurance authorization or approval of services.  To determine eligibility, please contact the members Insurance carrier to verify and review coverage.     If you have medical questions regarding this request for services. Please contact [address] at [phone] during normal business hours.        Authorizing Provider:Chaparro Ang MD  Authorizing Provider's NPI: [id number]  Order Entered By: Chaparro Ang MD 1/10/2023  2:40 PM     Electronically signed by: Chaparro Ang MD 1/10/2023  2:40 PM            History & Physical      Yani Garrison MD at 01/08/23 1453                [facility] Medicine Admission      Date of Admission: 1/8/2023      Primary Care Physician: Anali Sheppard DO      Chief Complaint: Abdominal pain    HPI:    Ms. Ave Ramirez is a 73-year-old female with remote history of stroke, CAD, PAD, hypertension, who presented to the hospital with complaints of lower abdominal pain.  Patient reported that her symptoms started this morning.  She denies having any associated fever, chills, rigors, nausea, vomiting, sweating, palpitations, urgency, frequency, hematuria, pyuria, dysuria.  She describes her pain as lower abdominal, radiating to her upper abdomen.  It has resolved since then.  She also reported worsening dysarthria on arrival to the ER but her symptoms have since then resolved.  She denies any new numbness/tingling/weakness in any part of her body along with any bowel/bladder incontinence, visual/auditory changes.    On arrival to the ER, telemetry neurology was consulted by ER physician.  CTA head and neck along with CT head with perfusion scan was performed which showed abnormal perfusion with chronic changes.  Patient is admitted to the hospital service for further evaluation  management.      Concurrent Medical History:  has a past medical history of Acute angina (HCC), Acute bronchitis, Acute sinusitis, Anxiety, Atherosclerotic heart disease of native coronary artery without angina pectoris, Borderline glaucoma, CAD (coronary artery disease), Cough, Depression, Disease of gallbladder, Encounter for gynecological examination (general) (routine) without abnormal findings, Encounter for screening mammogram for malignant neoplasm of breast, History of echocardiogram (2014), Hyperlipidemia, Hypertension, Hypothyroidism, Keratoconjunctivitis sicca (HCC), Myopia, Nausea and vomiting, Nuclear cataract, PVD (peripheral vascular disease) ([facility]), SOB (shortness of breath), and Upper respiratory infection.    Past Surgical History:  has a past surgical history that includes Cardiac catheterization (2014); Cholecystectomy (2012); Mammo bilateral (2016); Other surgical history (2003); Mammo bilateral (2016); Tonsillectomy;  section; Cardiac catheterization; and Coronary stent placement.    Family History: family history is not on file.       Social History:  reports that she has been smoking. She has never used smokeless tobacco. She reports that she does not drink alcohol and does not use drugs.    Allergies:   Allergies   Allergen Reactions   • Penicillins    • Lisinopril Rash       Medications:   Prior to Admission medications    Medication Sig Start Date End Date Taking? Authorizing Provider   aspirin 81 MG EC tablet Take 1 tablet by mouth Daily. 19  Yes Darrius Molina MD   atorvastatin (LIPITOR) 40 MG tablet Take 40 mg by mouth Daily. 16  Yes Farideh Lindsey MD   buPROPion SR (WELLBUTRIN SR) 150 MG 12 hr tablet Take 150 mg by mouth Daily. 6/3/22  Yes Anali Sheppard DO   Cholecalciferol (VITAMIN D3) 2000 UNITS tablet Take 1 tablet by mouth Daily. 16  Yes Farideh Lindsey MD   clopidogrel (PLAVIX) 75 MG tablet Take 75  mg by mouth Daily. 3/29/22  Yes Anali Sheppard DO   diazepam (VALIUM) 5 MG tablet Take 5 mg by mouth At Night As Needed for Anxiety. 4/11/16  Yes Farideh Lindsey MD   docusate sodium (COLACE) 100 MG capsule Take 100 mg by mouth Daily As Needed. 4/11/16  Yes Farideh Lindsey MD   Fluticasone Furoate-Vilanterol (Breo Ellipta) 200-25 MCG/INH inhaler INHALE 1 PUFF BY MOUTH DAILY 12/7/20 1/8/23 Yes Farideh Lindsey MD   furosemide (LASIX) 20 MG tablet Take 20 mg by mouth Daily. 4/5/16  Yes Farideh Lindsey MD   gabapentin (NEURONTIN) 100 MG capsule Take 400 mg by mouth 3 (Three) Times a Day.   Yes Farideh Lindsey MD   HYDROcodone-acetaminophen (NORCO) 5-325 MG per tablet Take 1 tablet by mouth Every 6 (Six) Hours As Needed for Moderate Pain .   Yes Farideh Lindsey MD   hydrOXYzine (ATARAX) 25 MG tablet Take 25 mg by mouth 3 (Three) Times a Day As Needed for Itching.   Yes Farideh Lindsey MD   ISOSORBIDE MONONITRATE PO Take 30 mg by mouth Daily. 4/12/22  Yes Anali Sheppard, DO   levocetirizine (XYZAL) 5 MG tablet Take 5 mg by mouth Daily. 5/18/22  Yes Farideh Lindsey MD   levothyroxine (SYNTHROID, LEVOTHROID) 75 MCG tablet Take 100 mcg by mouth Daily. 4/11/06  Yes Farideh Lindsey MD   omeprazole (priLOSEC) 40 MG capsule Take 40 mg by mouth Daily.   Yes Farideh Lindsey MD   promethazine (PHENERGAN) 25 MG tablet Take 25 mg by mouth Every 6 (Six) Hours As Needed for Nausea. 8/24/21  Yes Farideh Lindsey MD   tiZANidine (ZANAFLEX) 4 MG tablet Take 4 mg by mouth At Night As Needed for Muscle Spasms. 1 and 1/2 tablet by mouth 3 times a day   Yes Farideh Lindsey MD   Albuterol Sulfate (VENTOLIN HFA IN) Take 2 puffs by mouth Every 4 (Four) Hours As Needed (weezing). 4/27/22   Anali Sheppard, DO   DIPHENHYD-LIDOCAINE-NYSTATIN MT Apply  to the mouth or throat 4 (Four) Times a Day As Needed.    Provider, MD Farideh   escitalopram (LEXAPRO) 20 MG tablet Take 20 mg  by mouth Every Night. 4/11/16   ProviderFarideh MD   losartan (COZAAR) 50 MG tablet Take 1 tablet by mouth Daily. Take in PM 12/6/19   Darrius Molina MD   metoprolol succinate XL (TOPROL-XL) 50 MG 24 hr tablet Take 50 mg by mouth Every Night. 4/11/16   ProviderFarideh MD       Review of Systems:  Review of Systems   Otherwise complete ROS is negative except as mentioned above.    Physical Exam:   Temp:  [97.3 °F (36.3 °C)] 97.3 °F (36.3 °C)  Heart Rate:  [86-93] 86  Resp:  [18-20] 19  BP: (136-176)/(65-87) 136/87  Physical Exam  General: [Appears stated age, alert, oriented ×3, cooperative]  HEENT: [Normocephalic, atraumatic, EOMI, PERRLA, unremarkable external ear, moist mucous membranes, supple neck, no lymphadenopathy]  CVS: [RRR, S1, S2, no murmurs, normal peripheral pulses]  Respiratory: [CTA bilaterally, symmetrical expansion, no wheezing, no rales, no crackles]  Gastrointestinal: [Soft, nontender, nondistended, no organomegaly could be appreciated, normal bowel sounds]  Musculoskeletal: [Grossly normal, no tenderness, normal ROM]  Skin: [No rashes, no erythema, no lesions]  Extremities: [Normal inspection.  No edema, no cyanosis, no clubbing.  Normal capillary refill]  Neuro: [Alert, oriented ×3, dysarthria.  Otherwise neurological examination unremarkable]  Psychiatry: [No anxiety, no depression, nonsuicidal]        Results Reviewed:  I have personally reviewed current lab, radiology, and data and agree with results.  Lab Results (last 24 hours)     Procedure Component Value Units Date/Time    Extra Tubes [[id number]] Collected: 01/08/23 1237    Specimen: Blood, Venous Line Updated: 01/08/23 9929    Narrative:      The following orders were created for panel order Extra Tubes.  Procedure                               Abnormality         Status                     ---------                               -----------         ------                     Gold Top - [facility][[id number]]                                    In process                   Please view results for these tests on the individual orders.    Gold Top - SST [[id number]] Collected: 01/08/23 1237    Specimen: Blood Updated: 01/08/23 1451    TSH [[id number]] Collected: 01/08/23 1237    Specimen: Blood Updated: 01/08/23 1449    Extra Tubes [[id number]] Collected: 01/08/23 1237    Specimen: Blood Updated: 01/08/23 1345    Narrative:      The following orders were created for panel order Extra Tubes.  Procedure                               Abnormality         Status                     ---------                               -----------         ------                     Lavender Top[[id number]]                                     Final result               Green Top (Gel)[[id number]]                                  Final result                 Please view results for these tests on the individual orders.    Lavender Top [[id number]] Collected: 01/08/23 1237    Specimen: Blood Updated: 01/08/23 1345     Extra Tube hold for add-on     Comment: Auto resulted       Green Top (Gel) [[id number]] Collected: 01/08/23 1237    Specimen: Blood Updated: 01/08/23 1345     Extra Tube Hold for add-ons.     Comment: Auto resulted.       Lactic Acid, Plasma [[id number]]  (Abnormal) Collected: 01/08/23 1235    Specimen: Blood Updated: 01/08/23 1321     Lactate 2.2 mmol/L     [place] Draw [[id number]] Collected: 01/08/23 1010    Specimen: Blood Updated: 01/08/23 1312    Narrative:      The following orders were created for panel order [place] Draw.  Procedure                               Abnormality         Status                     ---------                               -----------         ------                     Green Top (Gel)[[id number]]                                  Final result               Lavender Top[[id number]]                                     Final result               Gold Top - SST[[id number]]                                                               Light Blue Top[[id number]]                                   Final result                 Please view results for these tests on the individual orders.    Blood Culture - Blood, Arm, Right [[id number]] Collected: 01/08/23 1235    Specimen: Blood from Arm, Right Updated: 01/08/23 1242    Blood Culture - Blood, Arm, Right [[id number]] Collected: 01/08/23 1235    Specimen: Blood from Arm, Right Updated: 01/08/23 1242    Urinalysis, Microscopic Only - Urine, Clean Catch [[id number]]  (Abnormal) Collected: 01/08/23 1135    Specimen: Urine, Clean Catch Updated: 01/08/23 1206     RBC, UA 31-50 /HPF      WBC, UA 6-12 /HPF      Bacteria, UA 1+ /HPF      Squamous Epithelial Cells, UA None Seen /HPF      Hyaline Casts, UA None Seen /LPF      Methodology Manual Light Microscopy    Urine Culture - Urine, Urine, Clean Catch [[id number]] Collected: 01/08/23 1135    Specimen: Urine, Clean Catch Updated: 01/08/23 1206    Urinalysis With Culture If Indicated - Urine, Clean Catch [[id number]]  (Abnormal) Collected: 01/08/23 1135    Specimen: Urine, Clean Catch Updated: 01/08/23 1152     Color, UA Yellow     Appearance, UA Clear     pH, UA 7.0     Specific [place], UA 1.084     Comment: Result obtained by Refractometer        Glucose, UA Negative     Ketones, UA Negative     Bilirubin, UA Negative     Blood, UA Moderate (2+)     Protein, UA 30 mg/dL (1+)     Leuk Esterase, UA Small (1+)     Nitrite, UA Positive     Urobilinogen, UA 0.2 E.U./dL    Narrative:      In absence of clinical symptoms, the presence of pyuria, bacteria, and/or nitrites on the urinalysis result does not correlate with infection.    Green Top (Gel) [[id number]] Collected: 01/08/23 1010    Specimen: Blood Updated: 01/08/23 1116     Extra Tube Hold for add-ons.     Comment: Auto resulted.       Lavender Top [[id number]] Collected: 01/08/23 1010    Specimen: Blood Updated: 01/08/23 1116     Extra Tube hold for add-on     Comment: Auto resulted       Light Blue Top  [[id number]] Collected: 01/08/23 1010    Specimen: Blood Updated: 01/08/23 1116     Extra Tube Hold for add-ons.     Comment: Auto resulted       POC Glucose Once [[id number]]  (Abnormal) Collected: 01/08/23 1002    Specimen: Blood Updated: 01/08/23 1104     Glucose 142 mg/dL      Comment: Result Not ConfirmedOperator: [id number] SRI Esquivel ID: [id number]       Comprehensive Metabolic Panel [[id number]]  (Abnormal) Collected: 01/08/23 1010    Specimen: Blood Updated: 01/08/23 1041     Glucose 152 mg/dL      BUN 8 mg/dL      Creatinine 0.82 mg/dL      Sodium 135 mmol/L      Potassium 4.0 mmol/L      Comment: Slight hemolysis detected by analyzer. Results may be affected.        Chloride 99 mmol/L      CO2 24.0 mmol/L      Calcium 9.4 mg/dL      Total Protein 7.2 g/dL      Albumin 4.1 g/dL      ALT (SGPT) 26 U/L      AST (SGOT) 25 U/L      Alkaline Phosphatase 143 U/L      Total Bilirubin 0.6 mg/dL      Globulin 3.1 gm/dL      A/G Ratio 1.3 g/dL      BUN/Creatinine Ratio 9.8     Anion Gap 12.0 mmol/L      eGFR 76.1 mL/min/1.73      Comment: National Kidney Foundation and American Society of Nephrology (ASN) Task Force recommended calculation based on the Chronic Kidney Disease Epidemiology Collaboration (CKD-EPI) equation refit without adjustment for race.       Narrative:      GFR Normal >60  Chronic Kidney Disease <60  Kidney Failure <15    The GFR formula is only valid for adults with stable renal function between ages 18 and 70.    Protime-INR [[id number]]  (Normal) Collected: 01/08/23 1010    Specimen: Blood Updated: 01/08/23 1038     Protime 13.7 Seconds      INR 1.06    Narrative:      Therapeutic range for most indications is 2.0-3.0 INR,  or 2.5-3.5 for mechanical heart valves.    aPTT [[id number]]  (Normal) Collected: 01/08/23 1010    Specimen: Blood Updated: 01/08/23 1038     PTT 33.4 seconds     Narrative:      The recommended Heparin therapeutic range is 68-97 seconds.    Troponin [[id number]]   (Normal) Collected: 01/08/23 1010    Specimen: Blood Updated: 01/08/23 1033     Troponin T <0.010 ng/mL     Narrative:      Troponin T Reference Range:  <= 0.03 ng/mL-   Negative for AMI  >0.03 ng/mL-     Abnormal for myocardial necrosis.  Clinicians would have to utilize clinical acumen, EKG, Troponin and serial changes to determine if it is an Acute Myocardial Infarction or myocardial injury due to an underlying chronic condition.       Results may be falsely decreased if patient taking Biotin.      CBC & Differential [[id number]]  (Abnormal) Collected: 01/08/23 1010    Specimen: Blood Updated: 01/08/23 1016    Narrative:      The following orders were created for panel order CBC & Differential.  Procedure                               Abnormality         Status                     ---------                               -----------         ------                     CBC Auto Differential[[id number]]        Abnormal            Final result                 Please view results for these tests on the individual orders.    CBC Auto Differential [[id number]]  (Abnormal) Collected: 01/08/23 1010    Specimen: Blood Updated: 01/08/23 1016     WBC 8.44 10*3/mm3      RBC 5.09 10*6/mm3      Hemoglobin 13.9 g/dL      Hematocrit 42.8 %      MCV 84.1 fL      MCH 27.3 pg      MCHC 32.5 g/dL      RDW 14.1 %      RDW-SD 43.1 fl      MPV 9.9 fL      Platelets 185 10*3/mm3      Neutrophil % 58.1 %      Lymphocyte % 28.9 %      Monocyte % 6.9 %      Eosinophil % 4.9 %      Basophil % 0.8 %      Immature Grans % 0.4 %      Neutrophils, Absolute 4.91 10*3/mm3      Lymphocytes, Absolute 2.44 10*3/mm3      Monocytes, Absolute 0.58 10*3/mm3      Eosinophils, Absolute 0.41 10*3/mm3      Basophils, Absolute 0.07 10*3/mm3      Immature Grans, Absolute 0.03 10*3/mm3      nRBC 0.0 /100 WBC         Imaging Results (Last 24 Hours)     Procedure Component Value Units Date/Time    XR Chest 1 View [[id number]] Collected: 01/08/23 1120     Updated:  01/08/23 1211    Narrative:      PROCEDURE: Portable chest x-ray    TECHNIQUE: Single frontal view of the chest    COMPARISON: 2/28/2022    HISTORY: Acute Stroke Protocol (onset < 12 hrs)    FINDINGS:  The lungs appear clear. Heart, hilar, and mediastinal structures  appear normal. No osseous abnormality is seen. Multiple punctate  calcific nodules are noted bilaterally which appear similar to  that on the previous exam and are likely due to old granulomatous  disease.      Impression:      No acute pulmonary disease.    Electronically signed by:  Ariel Degroot MD  1/8/2023 12:08 PM CST  Workstation: [id number]    CT Angiogram Head w AI Analysis of LVO [[id number]] Collected: 01/08/23 1025     Updated: 01/08/23 1150    Narrative:      PROCEDURE: CT HEAD ANGIOGRAPHY WITHOUT THEN WITH IV CONTRAST, CT  NECK ANGIOGRAPHY WITHOUT THEN WITH IV CONTRAST, CT HEAD PERFUSION  WITHOUT AND WITH IV CONTRAST    CLINICAL HISTORY: Stroke, follow up  Acute Stroke.    COMPARISON: CT neck dated 2/28/2022. Head CT dated 12/11/2014.    TECHNIQUE:   CT angiography of the head and neck was performed with  intravenous contrast on the level of the aortic arch to the  vertex of the brain in orthogonal planes, along with 3D  reconstruction of the arterial vasculature of the head and neck  from the source images.     Viz-AI: Study has been analyzed with deep learning artificial  intelligence algorithm for large vessel occlusion detection.    Measurement of carotid stenosis is based on NASCET criteria which  calculates the percentage of stenosis relative to the luminal  diameter of the normal carotid artery distal to the stenosis.    CONTRAST: Total of 140 mL IV Isovue 370    This exam was performed using radiation doses that are as low as  reasonably achievable (ALARA).  This exam was performed according to our departmental dose  optimization program, which includes automated exposure control,  adjustment of the mA and/or KV according to patient  size and/or  use of iterative reconstruction technique.    FINDINGS:   Encephalomalacia in the periphery of the left cerebral hemisphere  likely due to an old infarct.    HEAD:    RIGHT ANTERIOR CEREBRAL ARTERY: No acute findings. No occlusion  or significant stenosis. No aneurysm.    RIGHT MIDDLE CEREBRAL ARTERY: No acute findings. No occlusion or  significant stenosis. No aneurysm.    RIGHT POSTERIOR CEREBRAL ARTERY: No acute findings. No occlusion  or significant stenosis. No aneurysm.    RIGHT INTRACRANIAL INTERNAL CAROTID ARTERY: No acute findings. No  significant stenosis. No dissection or occlusion.    RIGHT INTRACRANIAL VERTEBRAL ARTERY: No acute findings. No  significant stenosis. No dissection or occlusion.      LEFT ANTERIOR CEREBRAL ARTERY: No acute findings. No occlusion or  significant stenosis. No aneurysm.    LEFT MIDDLE CEREBRAL ARTERY: Less than 45% flow, likely due to to  the presence of an old infarct. No acute findings. No occlusion  or significant stenosis. No aneurysm.    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 No acute  findings. No occlusion or significant stenosis. No aneurysm.    LEFT INTRACRANIAL INTERNAL CAROTID ARTERY: No acute findings. No  significant stenosis. No dissection or occlusion.    LEFT INTRACRANIAL VERTEBRAL ARTERY: No acute findings. No  significant stenosis. No dissection or occlusion.      BASILAR ARTERY: No acute findings. No occlusion or significant  stenosis. No aneurysm.      NECK:    RIGHT COMMON CAROTID ARTERY: No acute findings. No significant  stenosis. No dissection or occlusion.    RIGHT EXTRACRANIAL INTERNAL CAROTID ARTERY: No acute findings. No  significant stenosis. No dissection or occlusion.    RIGHT EXTERNAL CAROTID ARTERY: No acute findings. No occlusion.   RIGHT   EXTRACRANIAL VERTEBRAL ARTERY: No acute findings. No significant  stenosis. No dissection or occlusion.      LEFT COMMON CAROTID ARTERY: No acute findings. No  significant  stenosis. No dissection or occlusion.    LEFT EXTRACRANIAL INTERNAL CAROTID ARTERY: No acute findings. No  significant stenosis. No dissection or occlusion.    LEFT EXTERNAL CAROTID ARTERY: No acute findings. No occlusion.    LEFT EXTRACRANIAL VERTEBRAL ARTERY: Congenitally small. Arises  from collaterals rather than from the usual location on the left  subclavian artery. No acute findings.       CAROTID STENOSIS REFERENCE USING NASCET CRITERIA: % ICA stenosis  = (1 - narrowest ICA diameter/diameter of distal cervical ICA) x  100. Mild - <50% stenosis. Moderate - 50-69% stenosis. Severe -  70-94% stenosis. Near occlusion - 95-99% stenosis. Occluded -    Impression:      100% stenosis.  IMPRESSION: No large vessel occlusion or  significant stenosis in the arteries of the head and neck.    CT PERFUSION FINDINGS:  HCC focus of cerebral blood flow less than 30% located in the  left posterior frontal or parietal region. No areas of Tmax  greater than six seconds visualized. Findings are likely due to  the old infarct in this area.  The time to peak, mean transit time, cerebral blood flow, and  cerebral blood volume maps are otherwise symmetric and normal.  There is no ischemic core or penumbra.    CBF < 30% VOLUME: 8 mL.  TMAX > 6.0sec VOLUME: 0 mL.  MISMATCH VOLUME: -8 mL.  MISMATCH RATIO: 0 mL.    Additional findings: Degenerative changes of the cervical spine.  Fusion of C2 and C3 vertebral bodies.    IMPRESSION:  No acute CTA head and neck abnormality.  Abnormal perfusion findings of less than 30% cerebral blood flow  with a volume of 8 mL in the left MCA territory.  Less than 45% flow in this area seen on the blood vessel density  images with rapid AI.  Proximal aspect of the left vertebral artery nonopacified. 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 These findings are  chronic and are likely congenital.    Electronically signed by:   Ariel Degroot MD  1/8/2023 11:48 AM [facility]  Workstation: [id number]    CT Angiogram Neck [[id number]] Collected: 01/08/23 1025     Updated: 01/08/23 1150    Narrative:      PROCEDURE: CT HEAD ANGIOGRAPHY WITHOUT THEN WITH IV CONTRAST, CT  NECK ANGIOGRAPHY WITHOUT THEN WITH IV CONTRAST, CT HEAD PERFUSION  WITHOUT AND WITH IV CONTRAST    CLINICAL HISTORY: Stroke, follow up  Acute Stroke.    COMPARISON: CT neck dated 2/28/2022. Head CT dated 12/11/2014.    TECHNIQUE:   CT angiography of the head and neck was performed with  intravenous contrast on the level of the aortic arch to the  vertex of the brain in orthogonal planes, along with 3D  reconstruction of the arterial vasculature of the head and neck  from the source images.     Viz-AI: Study has been analyzed with deep learning artificial  intelligence algorithm for large vessel occlusion detection.    Measurement of carotid stenosis is based on NASCET criteria which  calculates the percentage of stenosis relative to the luminal  diameter of the normal carotid artery distal to the stenosis.    CONTRAST: Total of 140 mL IV Isovue 370    This exam was performed using radiation doses that are as low as  reasonably achievable (ALARA).  This exam was performed according to our departmental dose  optimization program, which includes automated exposure control,  adjustment of the mA and/or KV according to patient size and/or  use of iterative reconstruction technique.    FINDINGS:   Encephalomalacia in the periphery of the left cerebral hemisphere  likely due to an old infarct.    HEAD:    RIGHT ANTERIOR CEREBRAL ARTERY: No acute findings. No occlusion  or significant stenosis. No aneurysm.    RIGHT MIDDLE CEREBRAL ARTERY: No acute findings. No occlusion or  significant stenosis. No aneurysm.    RIGHT POSTERIOR CEREBRAL ARTERY: No acute findings. No occlusion  or significant stenosis. No aneurysm.    RIGHT INTRACRANIAL INTERNAL CAROTID ARTERY: No acute findings. No  significant  stenosis. No dissection or occlusion.    RIGHT INTRACRANIAL VERTEBRAL ARTERY: No acute findings. No  significant stenosis. No dissection or occlusion.      LEFT ANTERIOR CEREBRAL ARTERY: No acute findings. No occlusion or  significant stenosis. No aneurysm.    LEFT MIDDLE CEREBRAL ARTERY: Less than 45% flow, likely due to to  the presence of an old infarct. No acute findings. No occlusion  or significant stenosis. No aneurysm.    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 No acute  findings. No occlusion or significant stenosis. No aneurysm.    LEFT INTRACRANIAL INTERNAL CAROTID ARTERY: No acute findings. No  significant stenosis. No dissection or occlusion.    LEFT INTRACRANIAL VERTEBRAL ARTERY: No acute findings. No  significant stenosis. No dissection or occlusion.      BASILAR ARTERY: No acute findings. No occlusion or significant  stenosis. No aneurysm.      NECK:    RIGHT COMMON CAROTID ARTERY: No acute findings. No significant  stenosis. No dissection or occlusion.    RIGHT EXTRACRANIAL INTERNAL CAROTID ARTERY: No acute findings. No  significant stenosis. No dissection or occlusion.    RIGHT EXTERNAL CAROTID ARTERY: No acute findings. No occlusion.   RIGHT   EXTRACRANIAL VERTEBRAL ARTERY: No acute findings. No significant  stenosis. No dissection or occlusion.      LEFT COMMON CAROTID ARTERY: No acute findings. No significant  stenosis. No dissection or occlusion.    LEFT EXTRACRANIAL INTERNAL CAROTID ARTERY: No acute findings. No  significant stenosis. No dissection or occlusion.    LEFT EXTERNAL CAROTID ARTERY: No acute findings. No occlusion.    LEFT EXTRACRANIAL VERTEBRAL ARTERY: Congenitally small. Arises  from collaterals rather than from the usual location on the left  subclavian artery. No acute findings.       CAROTID STENOSIS REFERENCE USING NASCET CRITERIA: % ICA stenosis  = (1 - narrowest ICA diameter/diameter of distal cervical ICA) x  100. Mild - <50% stenosis. Moderate - 50-69%  stenosis. Severe -  70-94% stenosis. Near occlusion - 95-99% stenosis. Occluded -    Impression:      100% stenosis.  IMPRESSION: No large vessel occlusion or  significant stenosis in the arteries of the head and neck.    CT PERFUSION FINDINGS:  HCC focus of cerebral blood flow less than 30% located in the  left posterior frontal or parietal region. No areas of Tmax  greater than six seconds visualized. Findings are likely due to  the old infarct in this area.  The time to peak, mean transit time, cerebral blood flow, and  cerebral blood volume maps are otherwise symmetric and normal.  There is no ischemic core or penumbra.    CBF < 30% VOLUME: 8 mL.  TMAX > 6.0sec VOLUME: 0 mL.  MISMATCH VOLUME: -8 mL.  MISMATCH RATIO: 0 mL.    Additional findings: Degenerative changes of the cervical spine.  Fusion of C2 and C3 vertebral bodies.    IMPRESSION:  No acute CTA head and neck abnormality.  Abnormal perfusion findings of less than 30% cerebral blood flow  with a volume of 8 mL in the left MCA territory.  Less than 45% flow in this area seen on the blood vessel density  images with rapid AI.  Proximal aspect of the left vertebral artery nonopacified. 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 These findings are  chronic and are likely congenital.    Electronically signed by:  Ariel Degroot MD  1/8/2023 11:48 AM [facility]  Workstation: [id number]    CT CEREBRAL PERFUSION WITH & WITHOUT CONTRAST [[id number]] Collected: 01/08/23 1015     Updated: 01/08/23 1150    Narrative:      PROCEDURE: CT HEAD ANGIOGRAPHY WITHOUT THEN WITH IV CONTRAST, CT  NECK ANGIOGRAPHY WITHOUT THEN WITH IV CONTRAST, CT HEAD PERFUSION  WITHOUT AND WITH IV CONTRAST    CLINICAL HISTORY: Stroke, follow up  Acute Stroke.    COMPARISON: CT neck dated 2/28/2022. Head CT dated 12/11/2014.    TECHNIQUE:   CT angiography of the head and neck was performed with  intravenous contrast on the level of the  aortic arch to the  vertex of the brain in orthogonal planes, along with 3D  reconstruction of the arterial vasculature of the head and neck  from the source images.     Viz-AI: Study has been analyzed with deep learning artificial  intelligence algorithm for large vessel occlusion detection.    Measurement of carotid stenosis is based on NASCET criteria which  calculates the percentage of stenosis relative to the luminal  diameter of the normal carotid artery distal to the stenosis.    CONTRAST: Total of 140 mL IV Isovue 370    This exam was performed using radiation doses that are as low as  reasonably achievable (ALARA).  This exam was performed according to our departmental dose  optimization program, which includes automated exposure control,  adjustment of the mA and/or KV according to patient size and/or  use of iterative reconstruction technique.    FINDINGS:   Encephalomalacia in the periphery of the left cerebral hemisphere  likely due to an old infarct.    HEAD:    RIGHT ANTERIOR CEREBRAL ARTERY: No acute findings. No occlusion  or significant stenosis. No aneurysm.    RIGHT MIDDLE CEREBRAL ARTERY: No acute findings. No occlusion or  significant stenosis. No aneurysm.    RIGHT POSTERIOR CEREBRAL ARTERY: No acute findings. No occlusion  or significant stenosis. No aneurysm.    RIGHT INTRACRANIAL INTERNAL CAROTID ARTERY: No acute findings. No  significant stenosis. No dissection or occlusion.    RIGHT INTRACRANIAL VERTEBRAL ARTERY: No acute findings. No  significant stenosis. No dissection or occlusion.      LEFT ANTERIOR CEREBRAL ARTERY: No acute findings. No occlusion or  significant stenosis. No aneurysm.    LEFT MIDDLE CEREBRAL ARTERY: Less than 45% flow, likely due to to  the presence of an old infarct. No acute findings. No occlusion  or significant stenosis. No aneurysm.    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 No acute  findings. No occlusion or significant stenosis. No aneurysm.     LEFT INTRACRANIAL INTERNAL CAROTID ARTERY: No acute findings. No  significant stenosis. No dissection or occlusion.    LEFT INTRACRANIAL VERTEBRAL ARTERY: No acute findings. No  significant stenosis. No dissection or occlusion.      BASILAR ARTERY: No acute findings. No occlusion or significant  stenosis. No aneurysm.      NECK:    RIGHT COMMON CAROTID ARTERY: No acute findings. No significant  stenosis. No dissection or occlusion.    RIGHT EXTRACRANIAL INTERNAL CAROTID ARTERY: No acute findings. No  significant stenosis. No dissection or occlusion.    RIGHT EXTERNAL CAROTID ARTERY: No acute findings. No occlusion.   RIGHT   EXTRACRANIAL VERTEBRAL ARTERY: No acute findings. No significant  stenosis. No dissection or occlusion.      LEFT COMMON CAROTID ARTERY: No acute findings. No significant  stenosis. No dissection or occlusion.    LEFT EXTRACRANIAL INTERNAL CAROTID ARTERY: No acute findings. No  significant stenosis. No dissection or occlusion.    LEFT EXTERNAL CAROTID ARTERY: No acute findings. No occlusion.    LEFT EXTRACRANIAL VERTEBRAL ARTERY: Congenitally small. Arises  from collaterals rather than from the usual location on the left  subclavian artery. No acute findings.       CAROTID STENOSIS REFERENCE USING NASCET CRITERIA: % ICA stenosis  = (1 - narrowest ICA diameter/diameter of distal cervical ICA) x  100. Mild - <50% stenosis. Moderate - 50-69% stenosis. Severe -  70-94% stenosis. Near occlusion - 95-99% stenosis. Occluded -    Impression:      100% stenosis.  IMPRESSION: No large vessel occlusion or  significant stenosis in the arteries of the head and neck.    CT PERFUSION FINDINGS:  HCC focus of cerebral blood flow less than 30% located in the  left posterior frontal or parietal region. No areas of Tmax  greater than six seconds visualized. Findings are likely due to  the old infarct in this area.  The time to peak, mean transit time, cerebral blood flow, and  cerebral blood volume maps are  otherwise symmetric and normal.  There is no ischemic core or penumbra.    CBF < 30% VOLUME: 8 mL.  TMAX > 6.0sec VOLUME: 0 mL.  MISMATCH VOLUME: -8 mL.  MISMATCH RATIO: 0 mL.    Additional findings: Degenerative changes of the cervical spine.  Fusion of C2 and C3 vertebral bodies.    IMPRESSION:  No acute CTA head and neck abnormality.  Abnormal perfusion findings of less than 30% cerebral blood flow  with a volume of 8 mL in the left MCA territory.  Less than 45% flow in this area seen on the blood vessel density  images with rapid AI.  Proximal aspect of the left vertebral artery nonopacified. 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 These findings are  chronic and are likely congenital.    Electronically signed by:  Ariel Degroot MD  1/8/2023 11:48 AM C[facility]  Workstation: [id number]    CT Head Without Contrast Stroke Protocol [[id number]] Collected: 01/08/23 1015     Updated: 01/08/23 1051    Narrative:      EXAMINATION:  CT SCAN OF THE HEAD WITHOUT INTRAVENOUS CONTRAST    CLINICAL INFORMATION:  Neuro deficit, acute, stroke suspected    This exam was performed using radiation doses that are as low as  reasonably achievable (ALARA).  This exam was performed according to our departmental dose  optimization program, which includes automated exposure control,  adjustment of the mA and/or KV according to patient size and/or  use of iterative reconstruction technique.    COMPARISON: Head CT dated 12/11/2014. CTA neck dated 2/28/2022      TECHNIQUE:  Axial images from skull base to vertex.        FINDINGS:  Area of low density in the left temporal, frontal and parietal  region suspicious for an old infarct. Similar in appearance to  the previous CTA neck.  There is no evidence of intracranial hemorrhage, parenchymal  mass, midline shift, or focal mass effect.  Prominence of the lateral and third ventricles and cerebral  sulci, probably due to age-related  volume loss.  There is no extra-axial hemorrhage or collection identified.  The mastoid air cells and visualized paranasal sinuses appear  clear.      Impression:      No evidence of intracranial hemorrhage, mass effect or large  acute infarct.  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    Electronically signed by:  Ariel Degroot MD  1/8/2023 10:49 AM CST  Workstation: [id number]            Assessment:    Active Hospital Problems    Diagnosis    • **Dysarthria              Plan:          Possible CVA:  Possible TIA  Neurology on board  Neurochecks  Fasting blood panel, hemoglobin A1c  Continue aspirin and Plavix per home dose  Further imaging per neurology recommendation  Echocardiogram      Urinary tract infection  Blood cultures in process  Urine culture in process  Urinalysis positive  Continue IV ceftriaxone    Dyslipidemia:  Lipitor 80 mg daily    Hypertension:  Hold antihypertensive medication for permissive hypertension    Depression:  Continue Lexapro per home dose    Hypothyroidism:  Continue levothyroxine per home dose    Anxiety:  Continue Valium per home dose as needed        Patient is full code  Estimated length of stay: Greater than 2 midnights  DVT prophylaxis SCDs                    I discussed the patient's findings and my recommendations with: Patient and the     Yani Garrison MD                  Electronically signed by Yani Garrison MD at 01/08/23 [id number]

## 2023-01-10 NOTE — PLAN OF CARE
Goal Outcome Evaluation: Pt resting without difficulty. C/O pain x 1, prn norco effective. No acute changes noted. VSS. No distress.

## 2023-01-10 NOTE — PLAN OF CARE
Goal Outcome Evaluation:  Plan of Care Reviewed With: patient        Progress: improving  Outcome Evaluation: Speech: Pt with decreased speech intelligbility due to dysarthria and aphasia. Pt's speech is very limited due to decreased over-articulation, decreased loudness, and decreased functional communication. SLP encouragd  pt to use communication board and/or alphabet. Pt to continue to benefit from skilled ST.

## 2023-01-10 NOTE — PROGRESS NOTES
[facility] Medicine Services  INPATIENT PROGRESS NOTE    Length of Stay: 1  Date of Admission: 1/8/2023  Primary Care Physician: Anali Sheppard DO    Subjective   (S) feeling better; getting less short of breath upon walking    Review of Systems   All other systems reviewed and are negative.       All pertinent negatives and positives are as above. All other systems have been reviewed and are negative unless otherwise stated.     Prior to Admission medications    Medication Sig Start Date End Date Taking? Authorizing Provider   aspirin 81 MG EC tablet Take 1 tablet by mouth Daily. 11/12/19  Yes Darrius Molina MD   atorvastatin (LIPITOR) 40 MG tablet Take 40 mg by mouth Daily. 4/5/16  Yes Farideh Lindsey MD   buPROPion SR (WELLBUTRIN SR) 150 MG 12 hr tablet Take 150 mg by mouth Daily. 6/3/22  Yes Anali Sheppard DO   Cholecalciferol (VITAMIN D3) 2000 UNITS tablet Take 1 tablet by mouth Daily. 4/5/16  Yes Farideh Lindsey MD   clopidogrel (PLAVIX) 75 MG tablet Take 75 mg by mouth Daily. 3/29/22  Yes Anali Sheppard DO   diazepam (VALIUM) 5 MG tablet Take 5 mg by mouth At Night As Needed for Anxiety. 4/11/16  Yes Farideh Lindsey MD   docusate sodium (COLACE) 100 MG capsule Take 100 mg by mouth Daily As Needed. 4/11/16  Yes Farideh Lindsey MD   Fluticasone Furoate-Vilanterol (Breo Ellipta) 200-25 MCG/INH inhaler INHALE 1 PUFF BY MOUTH DAILY 12/7/20 1/8/23 Yes Farideh Lindsey MD   furosemide (LASIX) 20 MG tablet Take 20 mg by mouth Daily. 4/5/16  Yes Farideh Lindsey MD   gabapentin (NEURONTIN) 100 MG capsule Take 400 mg by mouth 3 (Three) Times a Day.   Yes Farideh Lindsey MD   HYDROcodone-acetaminophen (NORCO) 5-325 MG per tablet Take 1 tablet by mouth Every 6 (Six) Hours As Needed for Moderate Pain .   Yes Farideh Lindsey MD   hydrOXYzine (ATARAX) 25 MG tablet Take 25 mg by mouth 3 (Three) Times a Day As Needed for  Itching.   Yes Farideh Lindsey MD   ISOSORBIDE MONONITRATE PO Take 30 mg by mouth Daily. 4/12/22  Yes Anali Sheppard, DO   levocetirizine (XYZAL) 5 MG tablet Take 5 mg by mouth Daily. 5/18/22  Yes Farideh Lindsey MD   levothyroxine (SYNTHROID, LEVOTHROID) 75 MCG tablet Take 100 mcg by mouth Daily. 4/11/06  Yes Farideh Lindsey MD   omeprazole (priLOSEC) 40 MG capsule Take 40 mg by mouth Daily.   Yes Farideh Lindsey MD   promethazine (PHENERGAN) 25 MG tablet Take 25 mg by mouth Every 6 (Six) Hours As Needed for Nausea. 8/24/21  Yes Farideh Lindsey MD   tiZANidine (ZANAFLEX) 4 MG tablet Take 4 mg by mouth At Night As Needed for Muscle Spasms. 1 and 1/2 tablet by mouth 3 times a day   Yes Farideh Lindsey MD   Albuterol Sulfate (VENTOLIN HFA IN) Take 2 puffs by mouth Every 4 (Four) Hours As Needed (weezing). 4/27/22   Anali Sheppard, DO   DIPHENHYD-LIDOCAINE-NYSTATIN MT Apply  to the mouth or throat 4 (Four) Times a Day As Needed.    Farideh Lindsey MD   escitalopram (LEXAPRO) 20 MG tablet Take 20 mg by mouth Every Night. 4/11/16   Farideh Lindsey MD   losartan (COZAAR) 50 MG tablet Take 1 tablet by mouth Daily. Take in PM 12/6/19   Darrius Molina MD   metoprolol succinate XL (TOPROL-XL) 50 MG 24 hr tablet Take 50 mg by mouth Every Night. 4/11/16   Farideh Lindsey MD       aspirin, 81 mg, Oral, Daily  atorvastatin, 80 mg, Oral, Nightly  buPROPion SR, 150 mg, Oral, Daily  cefTRIAXone, 1 g, Intravenous, Q24H  cefTRIAXone, 1 g, Intravenous, Q24H  clopidogrel, 75 mg, Oral, Daily  escitalopram, 20 mg, Oral, Nightly  gabapentin, 300 mg, Oral, Q8H  pantoprazole, 40 mg, Oral, QAM  sodium chloride, 10 mL, Intravenous, Q12H  sodium chloride, 10 mL, Intravenous, Q12H           Objective    Temp:  [97.6 °F (36.4 °C)-97.7 °F (36.5 °C)] 97.7 °F (36.5 °C)  Heart Rate:  [77-88] 85  Resp:  [18-20] 20  BP: (136-170)/(72-84) 170/81    Physical Exam  HENT:      Head:  Normocephalic.      Nose: Nose normal.      Mouth/Throat:      Mouth: Mucous membranes are moist.   Eyes:      Extraocular Movements: Extraocular movements intact.   Cardiovascular:      Rate and Rhythm: Normal rate and regular rhythm.      Pulses: Normal pulses.      Heart sounds: Normal heart sounds.   Pulmonary:      Effort: Pulmonary effort is normal.      Breath sounds: Rales present.   Abdominal:      General: Bowel sounds are normal.   Musculoskeletal:      Cervical back: Normal range of motion.      Comments: Right sided weakness, old   Skin:     General: Skin is warm.   Neurological:      Mental Status: She is alert. Mental status is at baseline.   Psychiatric:         Mood and Affect: Mood normal.           Results Review:  I have reviewed the labs, radiology results, and diagnostic studies.    Laboratory Data:   Results from last 7 days   Lab Units 01/10/23  0558 01/09/23  0409 01/08/23  1010   SODIUM mmol/L 136 137 135*   POTASSIUM mmol/L 3.8 4.1 4.0   CHLORIDE mmol/L 98 100 99   CO2 mmol/L 27.0 27.0 24.0   BUN mg/dL 8 9 8   CREATININE mg/dL 0.74 0.71 0.82   GLUCOSE mg/dL 128* 119* 152*   CALCIUM mg/dL 9.0 9.0 9.4   BILIRUBIN mg/dL  --  0.5 0.6   ALK PHOS U/L  --  120* 143*   ALT (SGPT) U/L  --  23 26   AST (SGOT) U/L  --  23 25   ANION GAP mmol/L 11.0 10.0 12.0     Estimated Creatinine Clearance: 64.7 mL/min (by C-G formula based on SCr of 0.74 mg/dL).  Results from last 7 days   Lab Units 01/10/23  0558 01/09/23  0409   MAGNESIUM mg/dL 1.9 2.0   PHOSPHORUS mg/dL  --  4.7*         Results from last 7 days   Lab Units 01/10/23  0558 01/09/23  0409 01/08/23  1010   WBC 10*3/mm3 6.91 7.21 8.44   HEMOGLOBIN g/dL 12.6 12.6 13.9   HEMATOCRIT % 38.3 38.9 42.8   PLATELETS 10*3/mm3 172 178 185     Results from last 7 days   Lab Units 01/08/23  1010   INR  1.06       Culture Data:   Blood Culture   Date Value Ref Range Status   01/08/2023 No growth at 24 hours  Preliminary   01/08/2023 No growth at 24 hours   Preliminary     Urine Culture   Date Value Ref Range Status   01/08/2023 >100,000 CFU/mL Escherichia coli (A)  Final     No results found for: RESPCX  No results found for: WOUNDCX  No results found for: STOOLCX  No components found for: BODYFLD    Radiology Data:   Imaging Results (Last 24 Hours)     Procedure Component Value Units Date/Time    CT Abdomen Pelvis Without Contrast [[id number]] Collected: 01/09/23 2228     Updated: 01/09/23 2330    Narrative:      EXAM:  CT ABDOMEN PELVIS WITHOUT IV CONTRAST    ORDERING PROVIDER:  DEEPA TAVERAS    CLINICAL HISTORY:  lower abdominal pain, R47.1 Dysarthria and anarthria N39.0  Urinary tract infection, site not specified R31.9 Hematuria,  unspecified I63.312 Cerebral infarction due to thrombosis of left  middle cerebral artery I67.848 Other cerebrovascular vasospasm  and vasoconstriction Z74.09 Other reduced mobility Z78.9 Other  specified health status Z74.09 Other reduced mobility I25.10  Atherosclerotic heart disea    COMPARISON:      TECHNIQUE:   CT abdomen and pelvis performed without IV or oral contrast,  reformatted in the sagittal and coronal planes.     This examination was performed according to our departmental dose  optimization program which includes automated exposure control,  adjustment of the MA and kV according to patient size, and/or use  of iterative reconstruction technique.    FINDINGS:    BASILAR CHEST: No consolidation, nodule or effusion. Scattered  small calcified granulomas.    LIVER: No mass, enlargement or abnormal density.    BILIARY TRACT: Prior cholecystectomy.    SPLEEN: No mass or enlargement.    PANCREAS: No mass or inflammatory process. Normal pancreatic duct    ADRENAL GLANDS: Unremarkable. No mass.    URINARY SYSTEM: Kidneys are normal in size. No obstructing stone,  hydronephrosis, or mass. Normal ureters.  Bladder is normal  without mass or stone.      GI TRACT: No mass, dilation, or wall thickening.  No diverticula.   No  hernia.  Appendix is not well visualized.      REPRODUCTIVE SYSTEM: 1.1 CM calcified uterine fibroid.    PERITONEAL SPACE:No free air, free fluid, mass or adenopathy.    RETROPERITONEAL SPACE:  No adenopathy, mass, aneurysm or  significant vascular abnormality.     BONES AND EXTRA-ABDOMINAL SOFT TISSUES: Grade 1 spondylolisthesis  of L4 on L5..  No inguinal adenopathy or hernia.      Impression:      Prior cholecystectomy.  Grade 1 spondylolisthesis of L4 on L5.  No evidence of obstructive uropathy on either side.  No evidence of bowel obstruction or perienteric inflammation.    Electronically signed by:  Jeb Bailey MD  1/9/2023 11:28 PM CST  Workstation: [id number]    XR Chest 1 View [[id number]] Resulted: 01/09/23 2039     Updated: 01/09/23 2058    MRI Brain With & Without Contrast [[id number]] Collected: 01/09/23 0910     Updated: 01/09/23 1250    Narrative:      PROCEDURE: MR BRAIN WITHOUT THEN WITH IV CONTRAST    INDICATION:  Neuro deficit, acute, stroke suspected, R47.1  Dysarthria and anarthria N39.0 Urinary tract infection, site not  specified R31.9 Hematuria, unspecified I63.312 Cerebral  infarction due to thrombosis of left middle cerebral artery  I67.848 Other cerebrovascular vasospasm and vasoconstriction    COMPARISON: CT head January 8    TECHNIQUE: Multiplanar, multisequence MR of the brain was  performed with and without IV contrast.    FINDINGS:    Brain: No restricted diffusion. Encephalomalacia and gliosis left  MCA territory consistent with old infarct. No susceptibility  artifact. No hemorrhage or midline shift. Increased T2/FLAIR  signal in the periventricular and subcortical white matter is  nonspecific though likely related to chronic small vessel  ischemic changes. Cerebral and cerebellar atrophy.    Ventricular system: Ex vacuo dilatation of the left lateral  ventricle.    Basal cisterns: Normal    Cerebellum: Left cerebellar atrophy.    Brainstem: Unremarkable..    Calvarium: No visualized  fracture..     Vascular system: Normal.     Paranasal sinuses and mastoid air cells: Clear.     Visualized orbits: Normal    Sella: Normal    Soft tissues: Normal    Marrow: Normal      Impression:        No evidence of acute ischemia.    Old left MCA infarct    Atrophy    Chronic small vessel ischemic changes.    Electronically signed by:  Ghassan Whitney DO  1/9/2023 12:48 PM  CST Workstation: [id number]          I have reviewed the patient's current medications.     Assessment/Plan   Dysarthria      In the setting of previous CVA; MRI negative for an acute CVA; neuro recommended continue with DAPT and statin     Blood pressure is well controlled and good O2 sat    UTI with hematuria by E.coli     Continue with rocephin 2/5    Lower abdominal pain     Due to above?     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     [facility] Data  External documents reviewed: previous medical records  My EKG interpretation: none  My plain film interpretation: none  Tests considered but not ordered: none     Decision rules/scores evaluated (example [id number], Wells, etc): N/A     Discussed with: patient and her      Treatment Plan  Continue with current antibiotics    Care Planning  Shared decision making: patient and her   Code status and discussions: full code    Disposition  Social Determinants of Health that impact treatment or disposition: clinical status  I expect the patient to be discharged to home with [facility] tomorrow am    I confirmed that the patient's Advance Care Plan is present, code status is documented, or  surrogate decision maker is listed in the patient's medical record.     Chaparro Ang MD

## 2023-01-11 ENCOUNTER — READMISSION MANAGEMENT (OUTPATIENT)
Dept: CALL CENTER | Facility: HOSPITAL | Age: 73
End: 2023-01-11
Payer: MEDICARE

## 2023-01-11 VITALS
TEMPERATURE: 97.2 F | RESPIRATION RATE: 18 BRPM | WEIGHT: 173.3 LBS | OXYGEN SATURATION: 94 % | SYSTOLIC BLOOD PRESSURE: 156 MMHG | DIASTOLIC BLOOD PRESSURE: 69 MMHG | HEIGHT: 59 IN | BODY MASS INDEX: 34.94 KG/M2 | HEART RATE: 92 BPM

## 2023-01-11 LAB — GLUCOSE BLDC GLUCOMTR-MCNC: 141 MG/DL (ref 70–130)

## 2023-01-11 PROCEDURE — 92507 TX SP LANG VOICE COMM INDIV: CPT | Performed by: SPEECH-LANGUAGE PATHOLOGIST

## 2023-01-11 PROCEDURE — 97530 THERAPEUTIC ACTIVITIES: CPT

## 2023-01-11 PROCEDURE — 82962 GLUCOSE BLOOD TEST: CPT

## 2023-01-11 RX ORDER — CEFDINIR 300 MG/1
300 CAPSULE ORAL 2 TIMES DAILY
Qty: 6 CAPSULE | Refills: 0 | Status: SHIPPED | OUTPATIENT
Start: 2023-01-11 | End: 2023-01-14

## 2023-01-11 RX ADMIN — GABAPENTIN 300 MG: 300 CAPSULE ORAL at 06:04

## 2023-01-11 RX ADMIN — Medication 10 ML: at 08:09

## 2023-01-11 RX ADMIN — HYDROCODONE BITARTRATE AND ACETAMINOPHEN 1 TABLET: 5; 325 TABLET ORAL at 09:53

## 2023-01-11 RX ADMIN — ASPIRIN 81 MG: 81 TABLET, FILM COATED ORAL at 08:08

## 2023-01-11 RX ADMIN — BUPROPION HYDROCHLORIDE 150 MG: 150 TABLET, EXTENDED RELEASE ORAL at 08:08

## 2023-01-11 RX ADMIN — HYDROCODONE BITARTRATE AND ACETAMINOPHEN 1 TABLET: 5; 325 TABLET ORAL at 02:54

## 2023-01-11 RX ADMIN — PANTOPRAZOLE SODIUM 40 MG: 40 TABLET, DELAYED RELEASE ORAL at 06:04

## 2023-01-11 RX ADMIN — CLOPIDOGREL BISULFATE 75 MG: 75 TABLET ORAL at 08:08

## 2023-01-11 NOTE — PLAN OF CARE
Problem: Fall Injury Risk  Goal: Absence of Fall and Fall-Related Injury  Outcome: Adequate for Care Transition  Intervention: Promote Injury-Free Environment  Recent Flowsheet Documentation  Taken 1/11/2023 0900 by Avril Cottrell RN  Safety Promotion/Fall Prevention: safety round/check completed  Taken 1/11/2023 0754 by Avril Cottrell RN  Safety Promotion/Fall Prevention: safety round/check completed     Problem: Hypertension Comorbidity  Goal: Blood Pressure in Desired Range  Outcome: Adequate for Care Transition     Problem: Skin Injury Risk Increased  Goal: Skin Health and Integrity  Outcome: Adequate for Care Transition  Intervention: Optimize Skin Protection  Recent Flowsheet Documentation  Taken 1/11/2023 0754 by Avril Cottrell RN  Pressure Reduction Techniques: frequent weight shift encouraged  Pressure Reduction Devices: specialty bed utilized  Skin Protection:   adhesive use limited   tubing/devices free from skin contact     Problem: Adult Inpatient Plan of Care  Goal: Plan of Care Review  Outcome: Adequate for Care Transition  Flowsheets  Taken 1/11/2023 0934 by Avril Cottrell RN  Plan of Care Reviewed With:   patient   spouse  Outcome Evaluation: Discharging home with spouse and home health.  Ambulatory oxygen trial complete, pt does not qualify for home O2.  Will continue to monitor until discharged.  Taken 1/10/2023 1257 by May Solorzano CCC-SLP  Progress: improving  Goal: Patient-Specific Goal (Individualized)  Outcome: Adequate for Care Transition  Goal: Absence of Hospital-Acquired Illness or Injury  Outcome: Adequate for Care Transition  Intervention: Identify and Manage Fall Risk  Recent Flowsheet Documentation  Taken 1/11/2023 0900 by Avril Cottrell RN  Safety Promotion/Fall Prevention: safety round/check completed  Taken 1/11/2023 0754 by Avril Cottrell RN  Safety Promotion/Fall Prevention: safety round/check completed  Intervention: Prevent  Skin Injury  Recent Flowsheet Documentation  Taken 1/11/2023 0754 by Avril Cottrell, RN  Skin Protection:   adhesive use limited   tubing/devices free from skin contact  Intervention: Prevent Infection  Recent Flowsheet Documentation  Taken 1/11/2023 0754 by Avril Cottrell RN  Infection Prevention:   cohorting utilized   environmental surveillance performed   equipment surfaces disinfected   hand hygiene promoted   personal protective equipment utilized   single patient room provided  Goal: Optimal Comfort and Wellbeing  Outcome: Adequate for Care Transition  Intervention: Provide Person-Centered Care  Recent Flowsheet Documentation  Taken 1/11/2023 0754 by Avril Cottrell RN  Trust Relationship/Rapport:   care explained   thoughts/feelings acknowledged  Goal: Readiness for Transition of Care  Outcome: Adequate for Care Transition   Goal Outcome Evaluation:  Plan of Care Reviewed With: patient, spouse           Outcome Evaluation: Discharging home with spouse and home health.  Ambulatory oxygen trial complete, pt does not qualify for home O2.  Will continue to monitor until discharged.

## 2023-01-11 NOTE — DISCHARGE SUMMARY
[facility] Medicine Services  DISCHARGE SUMMARY       Date of Admission: 1/8/2023  Date of Discharge:  1/11/2023  Primary Care Physician: Anali Sheppard DO    Presenting Problem/History of Present Illness:  Dysarthria [R47.1]  Urinary tract infection with hematuria, site unspecified [N39.0, R31.9]       Final Discharge Diagnoses:  Dysarthria      In the setting of previous CVA; MRI negative for an acute CVA; neuro recommended continue with DAPT and statin     Blood pressure is well controlled and good O2 sat     UTI with hematuria by E.coli     will continue with omnicef at home     Lower abdominal pain     Due to above?     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    Consults:   Consults     Date and Time Order Name Status Description    1/8/2023  2:44 PM Inpatient Neurology Consult Stroke Completed     1/8/2023  9:59 AM Inpatient Neurology Consult Stroke Completed     1/8/2023  9:59 AM Inpatient Neurology Consult Stroke Completed           Procedures Performed:                 Pertinent Test Results:   Lab Results (most recent)     Procedure Component Value Units Date/Time    POC Glucose Once [[id number]]  (Abnormal) Collected: 01/11/23 0509    Specimen: Blood Updated: 01/11/23 0533     Glucose 141 mg/dL      Comment: RN NotifiedOperator: [id number] JESSICA TargovaxRAMONGHEN MATERIALS ID: [id number]       POC Glucose Once [[id number]]  (Normal) Collected: 01/10/23 2228    Specimen: Blood Updated: 01/10/23 2312     Glucose 119 mg/dL      Comment: RN NotifiedOperator: [id number] JESSICA TargovaxRAMONNMeter ID: [id number]       Blood Culture - Blood, Arm, Right [[id number]]  (Normal) Collected: 01/08/23 1235    Specimen: Blood from Arm, Right Updated: 01/10/23 1245  "    Blood Culture No growth at 2 days    Blood Culture - Blood, Arm, Right [[id number]]  (Normal) Collected: 01/08/23 1235    Specimen: Blood from Arm, Right Updated: 01/10/23 1245     Blood Culture No growth at 2 days    Urine Culture - Urine, Urine, Clean Catch [[id number]]  (Abnormal)  (Susceptibility) Collected: 01/08/23 1135    Specimen: Urine, Clean Catch Updated: 01/10/23 0943     Urine Culture >100,000 CFU/mL Escherichia coli    Narrative:      Colonization of the urinary tract without infection is common. Treatment is discouraged unless the patient is symptomatic, pregnant, or undergoing an invasive urologic procedure.    Susceptibility      Escherichia coli      ISABELA      Ampicillin Resistant     Ampicillin + Sulbactam Resistant     Cefazolin Susceptible      Cefepime Susceptible      Ceftazidime Susceptible      Ceftriaxone Susceptible      Gentamicin Susceptible      Levofloxacin Intermediate      Nitrofurantoin Susceptible      Piperacillin + Tazobactam Susceptible      Trimethoprim + Sulfamethoxazole Resistant                          Procalcitonin [[id number]]  (Normal) Collected: 01/10/23 0558    Specimen: Blood Updated: 01/10/23 0710     Procalcitonin 0.06 ng/mL     Narrative:      As a Marker for Sepsis (Non-Neonates):    1. <0.5 ng/mL represents a low risk of severe sepsis and/or septic shock.  2. >2 ng/mL represents a high risk of severe sepsis and/or septic shock.    As a Marker for Lower Respiratory Tract Infections that require antibiotic therapy:    PCT on Admission    Antibiotic Therapy       6-12 Hrs later    >0.5                Strongly Recommended  >0.25 - <0.5        Recommended   0.1 - 0.25          Discouraged              Remeasure/reassess PCT  <0.1                Strongly Discouraged     Remeasure/reassess PCT    As 28 day mortality risk marker: \"Change in Procalcitonin Result\" (>80% or <=80%) if Day 0 (or Day 1) and Day 4 values are available. Refer to " [email or web address]://[email or web address]-pct-[email or web address]    Change in PCT <=80%  A decrease of PCT levels below or equal to 80% defines a positive change in PCT test result representing a higher risk for 28-day all-cause mortality of patients diagnosed with severe sepsis for septic shock.    Change in PCT >80%  A decrease of PCT levels of more than 80% defines a negative change in PCT result representing a lower risk for 28-day all-cause mortality of patients diagnosed with severe sepsis or septic shock.       Basic Metabolic Panel [[id number]]  (Abnormal) Collected: 01/10/23 0558    Specimen: Blood Updated: 01/10/23 0702     Glucose 128 mg/dL      BUN 8 mg/dL      Creatinine 0.74 mg/dL      Sodium 136 mmol/L      Potassium 3.8 mmol/L      Chloride 98 mmol/L      CO2 27.0 mmol/L      Calcium 9.0 mg/dL      BUN/Creatinine Ratio 10.8     Anion Gap 11.0 mmol/L      eGFR 86.1 mL/min/1.73      Comment: National Kidney Foundation and American Society of Nephrology (ASN) Task Force recommended calculation based on the Chronic Kidney Disease Epidemiology Collaboration (CKD-EPI) equation refit without adjustment for race.       Narrative:      GFR Normal >60  Chronic Kidney Disease <60  Kidney Failure <15    The GFR formula is only valid for adults with stable renal function between ages 18 and 70.    CK [[id number]]  (Normal) Collected: 01/10/23 0558    Specimen: Blood Updated: 01/10/23 0702     Creatine Kinase 61 U/L     Magnesium [[id number]]  (Normal) Collected: 01/10/23 0558    Specimen: Blood Updated: 01/10/23 0702     Magnesium 1.9 mg/dL     CBC & Differential [[id number]]  (Abnormal) Collected: 01/10/23 0558    Specimen: Blood Updated: 01/10/23 0640    Narrative:      The following orders were created for panel order CBC & Differential.  Procedure                               Abnormality         Status                     ---------                               -----------         ------                     CBC Auto Differential[[id number]]         Abnormal            Final result                 Please view results for these tests on the individual orders.    CBC Auto Differential [[id number]]  (Abnormal) Collected: 01/10/23 0558    Specimen: Blood Updated: 01/10/23 0640     WBC 6.91 10*3/mm3      RBC 4.55 10*6/mm3      Hemoglobin 12.6 g/dL      Hematocrit 38.3 %      MCV 84.2 fL      MCH 27.7 pg      MCHC 32.9 g/dL      RDW 13.9 %      RDW-SD 42.6 fl      MPV 9.9 fL      Platelets 172 10*3/mm3      Neutrophil % 59.3 %      Lymphocyte % 24.5 %      Monocyte % 9.0 %      Eosinophil % 6.1 %      Basophil % 0.7 %      Immature Grans % 0.4 %      Neutrophils, Absolute 4.10 10*3/mm3      Lymphocytes, Absolute 1.69 10*3/mm3      Monocytes, Absolute 0.62 10*3/mm3      Eosinophils, Absolute 0.42 10*3/mm3      Basophils, Absolute 0.05 10*3/mm3      Immature Grans, Absolute 0.03 10*3/mm3      nRBC 0.0 /100 WBC     Hemoglobin A1c [[id number]]  (Abnormal) Collected: 01/09/23 0409    Specimen: Blood Updated: 01/09/23 0457     Hemoglobin A1C 5.90 %     Narrative:      Hemoglobin A1C Ranges:    Increased Risk for Diabetes  5.7% to 6.4%  Diabetes                     >= 6.5%  Diabetic Goal                < 7.0%    Comprehensive Metabolic Panel [[id number]]  (Abnormal) Collected: 01/09/23 0409    Specimen: Blood Updated: 01/09/23 0451     Glucose 119 mg/dL      BUN 9 mg/dL      Creatinine 0.71 mg/dL      Sodium 137 mmol/L      Potassium 4.1 mmol/L      Chloride 100 mmol/L      CO2 27.0 mmol/L      Calcium 9.0 mg/dL      Total Protein 6.4 g/dL      Albumin 3.8 g/dL      ALT (SGPT) 23 U/L      AST (SGOT) 23 U/L      Alkaline Phosphatase 120 U/L      Total Bilirubin 0.5 mg/dL      Globulin 2.6 gm/dL      A/G Ratio 1.5 g/dL      BUN/Creatinine Ratio 12.7     Anion Gap 10.0 mmol/L      eGFR 90.5 mL/min/1.73      Comment: National Kidney Foundation and American Society of Nephrology (ASN) Task Force recommended calculation based on the Chronic Kidney Disease Epidemiology  Collaboration (CKD-EPI) equation refit without adjustment for race.       Narrative:      GFR Normal >60  Chronic Kidney Disease <60  Kidney Failure <15    The GFR formula is only valid for adults with stable renal function between ages 18 and 70.    Lipid Panel [[id number]] Collected: 01/09/23 0409    Specimen: Blood Updated: 01/09/23 0451     Total Cholesterol 142 mg/dL      Triglycerides 137 mg/dL      HDL Cholesterol 40 mg/dL      LDL Cholesterol  78 mg/dL      VLDL Cholesterol 24 mg/dL      LDL/HDL Ratio 1.87    Narrative:      Cholesterol Reference Ranges  (U.S. Department of Health and Human Services ATP III Classifications)    Desirable          <200 mg/dL  Borderline High    200-239 mg/dL  High Risk          >240 mg/dL      Triglyceride Reference Ranges  (U.S. Department of Health and Human Services ATP III Classifications)    Normal           <150 mg/dL  Borderline High  150-199 mg/dL  High             200-499 mg/dL  Very High        >500 mg/dL    HDL Reference Ranges  (U.S. Department of Health and Human Services ATP III Classifications)    Low     <40 mg/dl (major risk factor for CHD)  High    >60 mg/dl ('negative' risk factor for CHD)        LDL Reference Ranges  (U.S. Department of Health and Human Services ATP III Classifications)    Optimal          <100 mg/dL  Near Optimal     100-129 mg/dL  Borderline High  130-159 mg/dL  High             160-189 mg/dL  Very High        >189 mg/dL    Magnesium [[id number]]  (Normal) Collected: 01/09/23 0409    Specimen: Blood Updated: 01/09/23 0451     Magnesium 2.0 mg/dL     Phosphorus [[id number]]  (Abnormal) Collected: 01/09/23 0409    Specimen: Blood Updated: 01/09/23 0451     Phosphorus 4.7 mg/dL     CBC Auto Differential [[id number]]  (Normal) Collected: 01/09/23 0409    Specimen: Blood Updated: 01/09/23 0430     WBC 7.21 10*3/mm3      RBC 4.60 10*6/mm3      Hemoglobin 12.6 g/dL      Hematocrit 38.9 %      MCV 84.6 fL      MCH 27.4 pg      MCHC 32.4 g/dL      RDW  14.2 %      RDW-SD 43.3 fl      MPV 10.0 fL      Platelets 178 10*3/mm3      Neutrophil % 58.2 %      Lymphocyte % 26.6 %      Monocyte % 8.5 %      Eosinophil % 5.5 %      Basophil % 0.8 %      Immature Grans % 0.4 %      Neutrophils, Absolute 4.19 10*3/mm3      Lymphocytes, Absolute 1.92 10*3/mm3      Monocytes, Absolute 0.61 10*3/mm3      Eosinophils, Absolute 0.40 10*3/mm3      Basophils, Absolute 0.06 10*3/mm3      Immature Grans, Absolute 0.03 10*3/mm3      nRBC 0.0 /100 WBC     STAT Lactic Acid, Reflex [[id number]]  (Normal) Collected: 01/08/23 2107    Specimen: Blood Updated: 01/08/23 2131     Lactate 1.9 mmol/L     STAT Lactic Acid, Reflex [[id number]]  (Abnormal) Collected: 01/08/23 1829    Specimen: Blood Updated: 01/08/23 1902     Lactate 2.4 mmol/L     TSH [[id number]]  (Normal) Collected: 01/08/23 1237    Specimen: Blood Updated: 01/08/23 1512     TSH 0.526 uIU/mL     Extra Tubes [[id number]] Collected: 01/08/23 1237    Specimen: Blood, Venous Line Updated: 01/08/23 1501    Narrative:      The following orders were created for panel order Extra Tubes.  Procedure                               Abnormality         Status                     ---------                               -----------         ------                     Gold Top - [facility][[id number]]                                   Final result                 Please view results for these tests on the individual orders.    [facility] [[id number]] Collected: 01/08/23 1237    Specimen: Blood Updated: 01/08/23 1501     Extra Tube Hold for add-ons.     Comment: Auto resulted.       Extra Tubes [[id number]] Collected: 01/08/23 1237    Specimen: Blood Updated: 01/08/23 1345    Narrative:      The following orders were created for panel order Extra Tubes.  Procedure                               Abnormality         Status                     ---------                               -----------         ------                     Lavender Top[[id number]]                                      Final result               Green Top (Gel)[[id number]]                                  Final result                 Please view results for these tests on the individual orders.    Lavender Top [[id number]] Collected: 01/08/23 1237    Specimen: Blood Updated: 01/08/23 1345     Extra Tube hold for add-on     Comment: Auto resulted       Green Top (Gel) [[id number]] Collected: 01/08/23 1237    Specimen: Blood Updated: 01/08/23 1345     Extra Tube Hold for add-ons.     Comment: Auto resulted.       Lactic Acid, Plasma [[id number]]  (Abnormal) Collected: 01/08/23 1235    Specimen: Blood Updated: 01/08/23 1321     Lactate 2.2 mmol/L     [place] Draw [[id number]] Collected: 01/08/23 1010    Specimen: Blood Updated: 01/08/23 1312    Narrative:      The following orders were created for panel order [place] Draw.  Procedure                               Abnormality         Status                     ---------                               -----------         ------                     Green Top (Gel)[[id number]]                                  Final result               Lavender Top[[id number]]                                     Final result               Gold Top - [facility]ST[[id number]]                                                              Light Blue Top[[id number]]                                   Final result                 Please view results for these tests on the individual orders.    Urinalysis, Microscopic Only - Urine, Clean Catch [[id number]]  (Abnormal) Collected: 01/08/23 1135    Specimen: Urine, Clean Catch Updated: 01/08/23 1206     RBC, UA 31-50 /HPF      WBC, UA 6-12 /HPF      Bacteria, UA 1+ /HPF      Squamous Epithelial Cells, UA None Seen /HPF      Hyaline Casts, UA None Seen /LPF      Methodology Manual Light Microscopy    Urinalysis With Culture If Indicated - Urine, Clean Catch [[id number]]  (Abnormal) Collected: 01/08/23 1135    Specimen: Urine, Clean Catch Updated: 01/08/23 1152      Color, UA Yellow     Appearance, UA Clear     pH, UA 7.0     Specific [place], UA 1.084     Comment: Result obtained by Refractometer        Glucose, UA Negative     Ketones, UA Negative     Bilirubin, UA Negative     Blood, UA Moderate (2+)     Protein, UA 30 mg/dL (1+)     Leuk Esterase, UA Small (1+)     Nitrite, UA Positive     Urobilinogen, UA 0.2 E.U./dL    Narrative:      In absence of clinical symptoms, the presence of pyuria, bacteria, and/or nitrites on the urinalysis result does not correlate with infection.    Green Top (Gel) [[id number]] Collected: 01/08/23 1010    Specimen: Blood Updated: 01/08/23 1116     Extra Tube Hold for add-ons.     Comment: Auto resulted.       Lavender Top [[id number]] Collected: 01/08/23 1010    Specimen: Blood Updated: 01/08/23 1116     Extra Tube hold for add-on     Comment: Auto resulted       Light Blue Top [[id number]] Collected: 01/08/23 1010    Specimen: Blood Updated: 01/08/23 1116     Extra Tube Hold for add-ons.     Comment: Auto resulted       Comprehensive Metabolic Panel [[id number]]  (Abnormal) Collected: 01/08/23 1010    Specimen: Blood Updated: 01/08/23 1041     Glucose 152 mg/dL      BUN 8 mg/dL      Creatinine 0.82 mg/dL      Sodium 135 mmol/L      Potassium 4.0 mmol/L      Comment: Slight hemolysis detected by analyzer. Results may be affected.        Chloride 99 mmol/L      CO2 24.0 mmol/L      Calcium 9.4 mg/dL      Total Protein 7.2 g/dL      Albumin 4.1 g/dL      ALT (SGPT) 26 U/L      AST (SGOT) 25 U/L      Alkaline Phosphatase 143 U/L      Total Bilirubin 0.6 mg/dL      Globulin 3.1 gm/dL      A/G Ratio 1.3 g/dL      BUN/Creatinine Ratio 9.8     Anion Gap 12.0 mmol/L      eGFR 76.1 mL/min/1.73      Comment: National Kidney Foundation and American Society of Nephrology (ASN) Task Force recommended calculation based on the Chronic Kidney Disease Epidemiology Collaboration (CKD-EPI) equation refit without adjustment for race.       Narrative:      GFR  Normal >60  Chronic Kidney Disease <60  Kidney Failure <15    The GFR formula is only valid for adults with stable renal function between ages 18 and 70.    Protime-INR [[id number]]  (Normal) Collected: 01/08/23 1010    Specimen: Blood Updated: 01/08/23 1038     Protime 13.7 Seconds      INR 1.06    Narrative:      Therapeutic range for most indications is 2.0-3.0 INR,  or 2.5-3.5 for mechanical heart valves.    aPTT [[id number]]  (Normal) Collected: 01/08/23 1010    Specimen: Blood Updated: 01/08/23 1038     PTT 33.4 seconds     Narrative:      The recommended Heparin therapeutic range is 68-97 seconds.    Troponin [[id number]]  (Normal) Collected: 01/08/23 1010    Specimen: Blood Updated: 01/08/23 1033     Troponin T <0.010 ng/mL     Narrative:      Troponin T Reference Range:  <= 0.03 ng/mL-   Negative for AMI  >0.03 ng/mL-     Abnormal for myocardial necrosis.  Clinicians would have to utilize clinical acumen, EKG, Troponin and serial changes to determine if it is an Acute Myocardial Infarction or myocardial injury due to an underlying chronic condition.       Results may be falsely decreased if patient taking Biotin.      CBC & Differential [[id number]]  (Abnormal) Collected: 01/08/23 1010    Specimen: Blood Updated: 01/08/23 1016    Narrative:      The following orders were created for panel order CBC & Differential.  Procedure                               Abnormality         Status                     ---------                               -----------         ------                     CBC Auto Differential[[id number]]        Abnormal            Final result                 Please view results for these tests on the individual orders.        Imaging Results (Most Recent)     Procedure Component Value Units Date/Time    XR Chest 1 View [[id number]] Collected: 01/09/23 2039     Updated: 01/10/23 1314    Narrative:      PROCEDURE: Portable chest x-ray    TECHNIQUE: Single frontal view of the chest    COMPARISON:  None    HISTORY: shortness of breath, R47.1 Dysarthria and anarthria  N39.0 Urinary tract infection, site not specified R31.9  Hematuria, unspecified I63.312 Cerebral infarction due to  thrombosis of left middle cerebral artery I67.848 Other  cerebrovascular vasospasm and vasoconstriction Z74.09 Other  reduced mobility Z78.9 Other specified health status Z74.09 Other  reduced mobility I25.10 Atherosclerotic heart disease.    FINDINGS:  The lungs appear clear. Heart, hilar, and mediastinal structures  appear normal. No osseous abnormality is seen.      Impression:      No acute pulmonary disease.    Electronically signed by:  Ariel Degroot MD  1/10/2023 1:08 PM CST  Workstation: [id number]    CT Abdomen Pelvis Without Contrast [[id number]] Collected: 01/09/23 2228     Updated: 01/09/23 2330    Narrative:      EXAM:  CT ABDOMEN PELVIS WITHOUT IV CONTRAST    ORDERING PROVIDER:  DEEPA TAVERAS    CLINICAL HISTORY:  lower abdominal pain, R47.1 Dysarthria and anarthria N39.0  Urinary tract infection, site not specified R31.9 Hematuria,  unspecified I63.312 Cerebral infarction due to thrombosis of left  middle cerebral artery I67.848 Other cerebrovascular vasospasm  and vasoconstriction Z74.09 Other reduced mobility Z78.9 Other  specified health status Z74.09 Other reduced mobility I25.10  Atherosclerotic heart disea    COMPARISON:      TECHNIQUE:   CT abdomen and pelvis performed without IV or oral contrast,  reformatted in the sagittal and coronal planes.     This examination was performed according to our departmental dose  optimization program which includes automated exposure control,  adjustment of the MA and kV according to patient size, and/or use  of iterative reconstruction technique.    FINDINGS:    BASILAR CHEST: No consolidation, nodule or effusion. Scattered  small calcified granulomas.    LIVER: No mass, enlargement or abnormal density.    BILIARY TRACT: Prior cholecystectomy.    SPLEEN: No mass or  enlargement.    PANCREAS: No mass or inflammatory process. Normal pancreatic duct    ADRENAL GLANDS: Unremarkable. No mass.    URINARY SYSTEM: Kidneys are normal in size. No obstructing stone,  hydronephrosis, or mass. Normal ureters.  Bladder is normal  without mass or stone.      GI TRACT: No mass, dilation, or wall thickening.  No diverticula.   No hernia.  Appendix is not well visualized.      REPRODUCTIVE SYSTEM: 1.1 CM calcified uterine fibroid.    PERITONEAL SPACE:No free air, free fluid, mass or adenopathy.    RETROPERITONEAL SPACE:  No adenopathy, mass, aneurysm or  significant vascular abnormality.     BONES AND EXTRA-ABDOMINAL SOFT TISSUES: Grade 1 spondylolisthesis  of L4 on L5..  No inguinal adenopathy or hernia.      Impression:      Prior cholecystectomy.  Grade 1 spondylolisthesis of L4 on L5.  No evidence of obstructive uropathy on either side.  No evidence of bowel obstruction or perienteric inflammation.    Electronically signed by:  Jeb Bailey MD  1/9/2023 11:28 PM CST  Workstation: [id number]    MRI Brain With & Without Contrast [[id number]] Collected: 01/09/23 0910     Updated: 01/09/23 1250    Narrative:      PROCEDURE: MR BRAIN WITHOUT THEN WITH IV CONTRAST    INDICATION:  Neuro deficit, acute, stroke suspected, R47.1  Dysarthria and anarthria N39.0 Urinary tract infection, site not  specified R31.9 Hematuria, unspecified I63.312 Cerebral  infarction due to thrombosis of left middle cerebral artery  I67.848 Other cerebrovascular vasospasm and vasoconstriction    COMPARISON: CT head January 8    TECHNIQUE: Multiplanar, multisequence MR of the brain was  performed with and without IV contrast.    FINDINGS:    Brain: No restricted diffusion. Encephalomalacia and gliosis left  MCA territory consistent with old infarct. No susceptibility  artifact. No hemorrhage or midline shift. Increased T2/FLAIR  signal in the periventricular and subcortical white matter is  nonspecific though likely related to  chronic small vessel  ischemic changes. Cerebral and cerebellar atrophy.    Ventricular system: Ex vacuo dilatation of the left lateral  ventricle.    Basal cisterns: Normal    Cerebellum: Left cerebellar atrophy.    Brainstem: Unremarkable..    Calvarium: No visualized fracture..     Vascular system: Normal.     Paranasal sinuses and mastoid air cells: Clear.     Visualized orbits: Normal    Sella: Normal    Soft tissues: Normal    Marrow: Normal      Impression:        No evidence of acute ischemia.    Old left MCA infarct    Atrophy    Chronic small vessel ischemic changes.    Electronically signed by:  Ghassan Whitney DO  1/9/2023 12:48 PM  CST Workstation: [id number]    XR Chest 1 View [[id number]] Collected: 01/08/23 1120     Updated: 01/08/23 1211    Narrative:      PROCEDURE: Portable chest x-ray    TECHNIQUE: Single frontal view of the chest    COMPARISON: 2/28/2022    HISTORY: Acute Stroke Protocol (onset < 12 hrs)    FINDINGS:  The lungs appear clear. Heart, hilar, and mediastinal structures  appear normal. No osseous abnormality is seen. Multiple punctate  calcific nodules are noted bilaterally which appear similar to  that on the previous exam and are likely due to old granulomatous  disease.      Impression:      No acute pulmonary disease.    Electronically signed by:  Ariel Degroot MD  1/8/2023 12:08 PM CST  Workstation: [id number]    CT Angiogram Head w AI Analysis of LVO [[id number]] Collected: 01/08/23 1025     Updated: 01/08/23 1150    Narrative:      PROCEDURE: CT HEAD ANGIOGRAPHY WITHOUT THEN WITH IV CONTRAST, CT  NECK ANGIOGRAPHY WITHOUT THEN WITH IV CONTRAST, CT HEAD PERFUSION  WITHOUT AND WITH IV CONTRAST    CLINICAL HISTORY: Stroke, follow up  Acute Stroke.    COMPARISON: CT neck dated 2/28/2022. Head CT dated 12/11/2014.    TECHNIQUE:   CT angiography of the head and neck was performed with  intravenous contrast on the level of the aortic arch to the  vertex of the brain in orthogonal planes,  along with 3D  reconstruction of the arterial vasculature of the head and neck  from the source images.     Viz-AI: Study has been analyzed with deep learning artificial  intelligence algorithm for large vessel occlusion detection.    Measurement of carotid stenosis is based on NASCET criteria which  calculates the percentage of stenosis relative to the luminal  diameter of the normal carotid artery distal to the stenosis.    CONTRAST: Total of 140 mL IV Isovue 370    This exam was performed using radiation doses that are as low as  reasonably achievable (ALARA).  This exam was performed according to our departmental dose  optimization program, which includes automated exposure control,  adjustment of the mA and/or KV according to patient size and/or  use of iterative reconstruction technique.    FINDINGS:   Encephalomalacia in the periphery of the left cerebral hemisphere  likely due to an old infarct.    HEAD:    RIGHT ANTERIOR CEREBRAL ARTERY: No acute findings. No occlusion  or significant stenosis. No aneurysm.    RIGHT MIDDLE CEREBRAL ARTERY: No acute findings. No occlusion or  significant stenosis. No aneurysm.    RIGHT POSTERIOR CEREBRAL ARTERY: No acute findings. No occlusion  or significant stenosis. No aneurysm.    RIGHT INTRACRANIAL INTERNAL CAROTID ARTERY: No acute findings. No  significant stenosis. No dissection or occlusion.    RIGHT INTRACRANIAL VERTEBRAL ARTERY: No acute findings. No  significant stenosis. No dissection or occlusion.      LEFT ANTERIOR CEREBRAL ARTERY: No acute findings. No occlusion or  significant stenosis. No aneurysm.    LEFT MIDDLE CEREBRAL ARTERY: Less than 45% flow, likely due to to  the presence of an old infarct. No acute findings. No occlusion  or significant stenosis. No aneurysm.    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 No acute  findings. No occlusion or significant stenosis. No aneurysm.    LEFT INTRACRANIAL INTERNAL CAROTID ARTERY: No acute  findings. No  significant stenosis. No dissection or occlusion.    LEFT INTRACRANIAL VERTEBRAL ARTERY: No acute findings. No  significant stenosis. No dissection or occlusion.      BASILAR ARTERY: No acute findings. No occlusion or significant  stenosis. No aneurysm.      NECK:    RIGHT COMMON CAROTID ARTERY: No acute findings. No significant  stenosis. No dissection or occlusion.    RIGHT EXTRACRANIAL INTERNAL CAROTID ARTERY: No acute findings. No  significant stenosis. No dissection or occlusion.    RIGHT EXTERNAL CAROTID ARTERY: No acute findings. No occlusion.   RIGHT   EXTRACRANIAL VERTEBRAL ARTERY: No acute findings. No significant  stenosis. No dissection or occlusion.      LEFT COMMON CAROTID ARTERY: No acute findings. No significant  stenosis. No dissection or occlusion.    LEFT EXTRACRANIAL INTERNAL CAROTID ARTERY: No acute findings. No  significant stenosis. No dissection or occlusion.    LEFT EXTERNAL CAROTID ARTERY: No acute findings. No occlusion.    LEFT EXTRACRANIAL VERTEBRAL ARTERY: Congenitally small. Arises  from collaterals rather than from the usual location on the left  subclavian artery. No acute findings.       CAROTID STENOSIS REFERENCE USING NASCET CRITERIA: % ICA stenosis  = (1 - narrowest ICA diameter/diameter of distal cervical ICA) x  100. Mild - <50% stenosis. Moderate - 50-69% stenosis. Severe -  70-94% stenosis. Near occlusion - 95-99% stenosis. Occluded -    Impression:      100% stenosis.  IMPRESSION: No large vessel occlusion or  significant stenosis in the arteries of the head and neck.    CT PERFUSION FINDINGS:  HCC focus of cerebral blood flow less than 30% located in the  left posterior frontal or parietal region. No areas of Tmax  greater than six seconds visualized. Findings are likely due to  the old infarct in this area.  The time to peak, mean transit time, cerebral blood flow, and  cerebral blood volume maps are otherwise symmetric and normal.  There is no ischemic core  or penumbra.    CBF < 30% VOLUME: 8 mL.  TMAX > 6.0sec VOLUME: 0 mL.  MISMATCH VOLUME: -8 mL.  MISMATCH RATIO: 0 mL.    Additional findings: Degenerative changes of the cervical spine.  Fusion of C2 and C3 vertebral bodies.    IMPRESSION:  No acute CTA head and neck abnormality.  Abnormal perfusion findings of less than 30% cerebral blood flow  with a volume of 8 mL in the left MCA territory.  Less than 45% flow in this area seen on the blood vessel density  images with rapid AI.  Proximal aspect of the left vertebral artery nonopacified. 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 These findings are  chronic and are likely congenital.    Electronically signed by:  Ariel Degroot MD  1/8/2023 11:48 AM CST  Workstation: [id number]    CT Angiogram Neck [[id number]] Collected: 01/08/23 1025     Updated: 01/08/23 1150    Narrative:      PROCEDURE: CT HEAD ANGIOGRAPHY WITHOUT THEN WITH IV CONTRAST, CT  NECK ANGIOGRAPHY WITHOUT THEN WITH IV CONTRAST, CT HEAD PERFUSION  WITHOUT AND WITH IV CONTRAST    CLINICAL HISTORY: Stroke, follow up  Acute Stroke.    COMPARISON: CT neck dated 2/28/2022. Head CT dated 12/11/2014.    TECHNIQUE:   CT angiography of the head and neck was performed with  intravenous contrast on the level of the aortic arch to the  vertex of the brain in orthogonal planes, along with 3D  reconstruction of the arterial vasculature of the head and neck  from the source images.     Viz-AI: Study has been analyzed with deep learning artificial  intelligence algorithm for large vessel occlusion detection.    Measurement of carotid stenosis is based on NASCET criteria which  calculates the percentage of stenosis relative to the luminal  diameter of the normal carotid artery distal to the stenosis.    CONTRAST: Total of 140 mL IV Isovue 370    This exam was performed using radiation doses that are as low as  reasonably achievable (ALARA).  This exam was  performed according to our departmental dose  optimization program, which includes automated exposure control,  adjustment of the mA and/or KV according to patient size and/or  use of iterative reconstruction technique.    FINDINGS:   Encephalomalacia in the periphery of the left cerebral hemisphere  likely due to an old infarct.    HEAD:    RIGHT ANTERIOR CEREBRAL ARTERY: No acute findings. No occlusion  or significant stenosis. No aneurysm.    RIGHT MIDDLE CEREBRAL ARTERY: No acute findings. No occlusion or  significant stenosis. No aneurysm.    RIGHT POSTERIOR CEREBRAL ARTERY: No acute findings. No occlusion  or significant stenosis. No aneurysm.    RIGHT INTRACRANIAL INTERNAL CAROTID ARTERY: No acute findings. No  significant stenosis. No dissection or occlusion.    RIGHT INTRACRANIAL VERTEBRAL ARTERY: No acute findings. No  significant stenosis. No dissection or occlusion.      LEFT ANTERIOR CEREBRAL ARTERY: No acute findings. No occlusion or  significant stenosis. No aneurysm.    LEFT MIDDLE CEREBRAL ARTERY: Less than 45% flow, likely due to to  the presence of an old infarct. No acute findings. No occlusion  or significant stenosis. No aneurysm.    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 No acute  findings. No occlusion or significant stenosis. No aneurysm.    LEFT INTRACRANIAL INTERNAL CAROTID ARTERY: No acute findings. No  significant stenosis. No dissection or occlusion.    LEFT INTRACRANIAL VERTEBRAL ARTERY: No acute findings. No  significant stenosis. No dissection or occlusion.      BASILAR ARTERY: No acute findings. No occlusion or significant  stenosis. No aneurysm.      NECK:    RIGHT COMMON CAROTID ARTERY: No acute findings. No significant  stenosis. No dissection or occlusion.    RIGHT EXTRACRANIAL INTERNAL CAROTID ARTERY: No acute findings. No  significant stenosis. No dissection or occlusion.    RIGHT EXTERNAL CAROTID ARTERY: No acute findings. No occlusion.   RIGHT   EXTRACRANIAL  VERTEBRAL ARTERY: No acute findings. No significant  stenosis. No dissection or occlusion.      LEFT COMMON CAROTID ARTERY: No acute findings. No significant  stenosis. No dissection or occlusion.    LEFT EXTRACRANIAL INTERNAL CAROTID ARTERY: No acute findings. No  significant stenosis. No dissection or occlusion.    LEFT EXTERNAL CAROTID ARTERY: No acute findings. No occlusion.    LEFT EXTRACRANIAL VERTEBRAL ARTERY: Congenitally small. Arises  from collaterals rather than from the usual location on the left  subclavian artery. No acute findings.       CAROTID STENOSIS REFERENCE USING NASCET CRITERIA: % ICA stenosis  = (1 - narrowest ICA diameter/diameter of distal cervical ICA) x  100. Mild - <50% stenosis. Moderate - 50-69% stenosis. Severe -  70-94% stenosis. Near occlusion - 95-99% stenosis. Occluded -    Impression:      100% stenosis.  IMPRESSION: No large vessel occlusion or  significant stenosis in the arteries of the head and neck.    CT PERFUSION FINDINGS:  HCC focus of cerebral blood flow less than 30% located in the  left posterior frontal or parietal region. No areas of Tmax  greater than six seconds visualized. Findings are likely due to  the old infarct in this area.  The time to peak, mean transit time, cerebral blood flow, and  cerebral blood volume maps are otherwise symmetric and normal.  There is no ischemic core or penumbra.    CBF < 30% VOLUME: 8 mL.  TMAX > 6.0sec VOLUME: 0 mL.  MISMATCH VOLUME: -8 mL.  MISMATCH RATIO: 0 mL.    Additional findings: Degenerative changes of the cervical spine.  Fusion of C2 and C3 vertebral bodies.    IMPRESSION:  No acute CTA head and neck abnormality.  Abnormal perfusion findings of less than 30% cerebral blood flow  with a volume of 8 mL in the left MCA territory.  Less than 45% flow in this area seen on the blood vessel density  images with rapid AI.  Proximal aspect of the left vertebral artery nonopacified. An  otherwise patent, nondominant left vertebral  artery arises from  collaterals in the lower cervical region. These findings are  chronic and are likely congenital.    Electronically signed by:  Ariel Degroot MD  1/8/2023 11:48 AM CST  Workstation: [id number]    CT CEREBRAL PERFUSION WITH & WITHOUT CONTRAST [[id number]] Collected: 01/08/23 1015     Updated: 01/08/23 1150    Narrative:      PROCEDURE: CT HEAD ANGIOGRAPHY WITHOUT THEN WITH IV CONTRAST, CT  NECK ANGIOGRAPHY WITHOUT THEN WITH IV CONTRAST, CT HEAD PERFUSION  WITHOUT AND WITH IV CONTRAST    CLINICAL HISTORY: Stroke, follow up  Acute Stroke.    COMPARISON: CT neck dated 2/28/2022. Head CT dated 12/11/2014.    TECHNIQUE:   CT angiography of the head and neck was performed with  intravenous contrast on the level of the aortic arch to the  vertex of the brain in orthogonal planes, along with 3D  reconstruction of the arterial vasculature of the head and neck  from the source images.     Viz-AI: Study has been analyzed with deep learning artificial  intelligence algorithm for large vessel occlusion detection.    Measurement of carotid stenosis is based on NASCET criteria which  calculates the percentage of stenosis relative to the luminal  diameter of the normal carotid artery distal to the stenosis.    CONTRAST: Total of 140 mL IV Isovue 370    This exam was performed using radiation doses that are as low as  reasonably achievable (ALARA).  This exam was performed according to our departmental dose  optimization program, which includes automated exposure control,  adjustment of the mA and/or KV according to patient size and/or  use of iterative reconstruction technique.    FINDINGS:   Encephalomalacia in the periphery of the left cerebral hemisphere  likely due to an old infarct.    HEAD:    RIGHT ANTERIOR CEREBRAL ARTERY: No acute findings. No occlusion  or significant stenosis. No aneurysm.    RIGHT MIDDLE CEREBRAL ARTERY: No acute findings. No occlusion or  significant stenosis. No aneurysm.    RIGHT  POSTERIOR CEREBRAL ARTERY: No acute findings. No occlusion  or significant stenosis. No aneurysm.    RIGHT INTRACRANIAL INTERNAL CAROTID ARTERY: No acute findings. No  significant stenosis. No dissection or occlusion.    RIGHT INTRACRANIAL VERTEBRAL ARTERY: No acute findings. No  significant stenosis. No dissection or occlusion.      LEFT ANTERIOR CEREBRAL ARTERY: No acute findings. No occlusion or  significant stenosis. No aneurysm.    LEFT MIDDLE CEREBRAL ARTERY: Less than 45% flow, likely due to to  the presence of an old infarct. No acute findings. No occlusion  or significant stenosis. No aneurysm.    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 No acute  findings. No occlusion or significant stenosis. No aneurysm.    LEFT INTRACRANIAL INTERNAL CAROTID ARTERY: No acute findings. No  significant stenosis. No dissection or occlusion.    LEFT INTRACRANIAL VERTEBRAL ARTERY: No acute findings. No  significant stenosis. No dissection or occlusion.      BASILAR ARTERY: No acute findings. No occlusion or significant  stenosis. No aneurysm.      NECK:    RIGHT COMMON CAROTID ARTERY: No acute findings. No significant  stenosis. No dissection or occlusion.    RIGHT EXTRACRANIAL INTERNAL CAROTID ARTERY: No acute findings. No  significant stenosis. No dissection or occlusion.    RIGHT EXTERNAL CAROTID ARTERY: No acute findings. No occlusion.   RIGHT   EXTRACRANIAL VERTEBRAL ARTERY: No acute findings. No significant  stenosis. No dissection or occlusion.      LEFT COMMON CAROTID ARTERY: No acute findings. No significant  stenosis. No dissection or occlusion.    LEFT EXTRACRANIAL INTERNAL CAROTID ARTERY: No acute findings. No  significant stenosis. No dissection or occlusion.    LEFT EXTERNAL CAROTID ARTERY: No acute findings. No occlusion.    LEFT EXTRACRANIAL VERTEBRAL ARTERY: Congenitally small. Arises  from collaterals rather than from the usual location on the left  subclavian artery. No acute findings.        CAROTID STENOSIS REFERENCE USING NASCET CRITERIA: % ICA stenosis  = (1 - narrowest ICA diameter/diameter of distal cervical ICA) x  100. Mild - <50% stenosis. Moderate - 50-69% stenosis. Severe -  70-94% stenosis. Near occlusion - 95-99% stenosis. Occluded -    Impression:      100% stenosis.  IMPRESSION: No large vessel occlusion or  significant stenosis in the arteries of the head and neck.    CT PERFUSION FINDINGS:  HCC focus of cerebral blood flow less than 30% located in the  left posterior frontal or parietal region. No areas of Tmax  greater than six seconds visualized. Findings are likely due to  the old infarct in this area.  The time to peak, mean transit time, cerebral blood flow, and  cerebral blood volume maps are otherwise symmetric and normal.  There is no ischemic core or penumbra.    CBF < 30% VOLUME: 8 mL.  TMAX > 6.0sec VOLUME: 0 mL.  MISMATCH VOLUME: -8 mL.  MISMATCH RATIO: 0 mL.    Additional findings: Degenerative changes of the cervical spine.  Fusion of C2 and C3 vertebral bodies.    IMPRESSION:  No acute CTA head and neck abnormality.  Abnormal perfusion findings of less than 30% cerebral blood flow  with a volume of 8 mL in the left MCA territory.  Less than 45% flow in this area seen on the blood vessel density  images with rapid AI.  Proximal aspect of the left vertebral artery nonopacified. 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 These findings are  chronic and are likely congenital.    Electronically signed by:  Ariel Degroot MD  1/8/2023 11:48 AM CST  Workstation: [id number]    CT Head Without Contrast Stroke Protocol [[id number]] Collected: 01/08/23 1015     Updated: 01/08/23 1051    Narrative:      EXAMINATION:  CT SCAN OF THE HEAD WITHOUT INTRAVENOUS CONTRAST    CLINICAL INFORMATION:  Neuro deficit, acute, stroke suspected    This exam was performed using radiation doses that are as low as  reasonably achievable  "(ALLYN).  This exam was performed according to our departmental dose  optimization program, which includes automated exposure control,  adjustment of the mA and/or KV according to patient size and/or  use of iterative reconstruction technique.    COMPARISON: Head CT dated 12/11/2014. CTA neck dated 2/28/2022      TECHNIQUE:  Axial images from skull base to vertex.        FINDINGS:  Area of low density in the left temporal, frontal and parietal  region suspicious for an old infarct. Similar in appearance to  the previous CTA neck.  There is no evidence of intracranial hemorrhage, parenchymal  mass, midline shift, or focal mass effect.  Prominence of the lateral and third ventricles and cerebral  sulci, probably due to age-related volume loss.  There is no extra-axial hemorrhage or collection identified.  The mastoid air cells and visualized paranasal sinuses appear  clear.      Impression:      No evidence of intracranial hemorrhage, mass effect or large  acute infarct.  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    Electronically signed by:  Ariel Degroot MD  1/8/2023 10:49 AM CST  Workstation: [id number]          Chief Complaint on Day of Discharge:None    Hospital Course:  The patient is a 73 y.o. female who presented to [facility] with weakness, suspected for acute CVA, which was ruled out with MRI brain; PT and OT and ST had been working with her and she would need follow up with them as outpatient. She is back to her baseline.  Will continue with omnicef to complete therapy for UTI       Condition on Discharge:  Stable at baseline    Physical Exam on Discharge:  /69 (BP Location: Left arm, Patient Position: Lying)   Pulse 92   Temp 97.2 °F (36.2 °C) (Temporal)   Resp 18   Ht 148.6 cm (58.5\")   Wt 78.6 kg (173 lb 4.8 oz)   SpO2 94% Comment: sitting edge of bed  BMI 35.60 kg/m²   Physical Exam  HENT:      Head: Normocephalic.      Nose: Nose " normal.      Mouth/Throat:      Mouth: Mucous membranes are moist.   Eyes:      Extraocular Movements: Extraocular movements intact.   Cardiovascular:      Rate and Rhythm: Normal rate and regular rhythm.      Heart sounds: Normal heart sounds.   Pulmonary:      Breath sounds: Normal breath sounds.   Abdominal:      Palpations: Abdomen is soft.   Musculoskeletal:      Cervical back: Normal range of motion and neck supple.   Skin:     General: Skin is warm.   Neurological:      Mental Status: She is alert. Mental status is at baseline.   Psychiatric:         Mood and Affect: Mood normal.         [place] [facility] [place]:     Your medication list      START taking these medications      Instructions Last Dose Given Next Dose Due   cefdinir 300 MG capsule  Commonly known as: OMNICEF      Take 1 capsule by mouth 2 (Two) Times a Day for 3 days.          CONTINUE taking these medications      Instructions Last Dose Given Next Dose Due   aspirin 81 MG EC tablet  Notes to patient: 01/12/2023      Take 1 tablet by mouth Daily.       atorvastatin 40 MG tablet  Commonly known as: LIPITOR  Notes to patient: 01/12/2023      Take 40 mg by mouth Daily.       Breo Ellipta 200-25 MCG/ACT inhaler  Generic drug: Fluticasone Furoate-Vilanterol  Notes to patient: 01/12/2023      INHALE 1 PUFF BY MOUTH DAILY       buPROPion  MG 12 hr tablet  Commonly known as: WELLBUTRIN SR  Notes to patient: 01/12/2023      Take 150 mg by mouth Daily.       clopidogrel 75 MG tablet  Commonly known as: PLAVIX  Notes to patient: 01/12/2023      Take 75 mg by mouth Daily.       diazePAM 5 MG tablet  Commonly known as: VALIUM  Notes to patient: As needed      Take 5 mg by mouth At Night As Needed for Anxiety.       DIPHENHYD-LIDOCAINE-NYSTATIN MT  Notes to patient: needed      Apply  to the mouth or throat 4 (Four) Times a Day As Needed.       docusate sodium 100 MG capsule  Commonly known as: COLACE  Notes  to patient: As needed      Take 100 mg by mouth Daily As Needed.       escitalopram 20 MG tablet  Commonly known as: LEXAPRO  Notes to patient: 01/11/2023      Take 20 mg by mouth Every Night.       furosemide 20 MG tablet  Commonly known as: LASIX  Notes to patient: 01/12/2023      Take 20 mg by mouth Daily.       gabapentin 100 MG capsule  Commonly known as: NEURONTIN  Notes to patient: 01/11/2023      Take 400 mg by mouth 3 (Three) Times a Day.       HYDROcodone-acetaminophen 5-325 MG per tablet  Commonly known as: NORCO  Notes to patient: As needed      Take 1 tablet by mouth Every 6 (Six) Hours As Needed for Moderate Pain .       hydrOXYzine 25 MG tablet  Commonly known as: ATARAX  Notes to patient: As needed      Take 25 mg by mouth 3 (Three) Times a Day As Needed for Itching.       ISOSORBIDE MONONITRATE PO  Notes to patient: 01/12/2023      Take 30 mg by mouth Daily.       levocetirizine 5 MG tablet  Commonly known as: XYZAL  Notes to patient: 01/12/2023      Take 5 mg by mouth Daily.       levothyroxine 75 MCG tablet  Commonly known as: SYNTHROID, LEVOTHROID  Start taking on: April 11, 2106  Notes to patient: 01/12/2023      Take 100 mcg by mouth Daily.       losartan 50 MG tablet  Commonly known as: COZAAR  Notes to patient: 01/11/2023      Take 1 tablet by mouth Daily. Take in PM       metoprolol succinate XL 50 MG 24 hr tablet  Commonly known as: TOPROL-XL  Notes to patient: 01/11/2023      Take 50 mg by mouth Every Night.       omeprazole 40 MG capsule  Commonly known as: priLOSEC  Notes to patient: 01/12/2023      Take 40 mg by mouth Daily.       promethazine 25 MG tablet  Commonly known as: PHENERGAN  Notes to patient: As needed      Take 25 mg by mouth Every 6 (Six) Hours As Needed for Nausea.       tiZANidine 4 MG tablet  Commonly known as: ZANAFLEX  Notes to patient: As needed      Take 4 mg by mouth At Night As Needed for Muscle Spasms. 1 and 1/2 tablet by mouth 3 times a day       VENTOLIN HFA  IN  Notes to patient: As needed      Take 2 puffs by mouth Every 4 (Four) Hours As Needed (weezing).       Vitamin D3 50 MCG (2000 UT) tablet  Notes to patient: 01/12/2023      Take 1 tablet by mouth Daily.             Where to Get Your Medications      These medications were sent to [facility]  - [place], [place] - [address] [facility] - [phone]  - [phone] [address] [address]    Phone: [phone]   · cefdinir 300 MG capsule          Discharge Diet: heart healthy diet    Activity at Discharge: as tolerated    Discharge Care Plan/Instructions: see chart    Follow-up Appointments:   No future appointments.    Test Results Pending at Discharge:   Pending Labs     Order Current Status    Blood Culture - Blood, Arm, Right Preliminary result    Blood Culture - Blood, Arm, Right Preliminary result          Chaparro Ang MD    Time: 26 min

## 2023-01-11 NOTE — PLAN OF CARE
Goal Outcome Evaluation: Pt resting without difficulty. C/O pain/discomfort x 1, prn given, effective with no further complaints voiced at this time. No acute changes. VSS. No distress.

## 2023-01-11 NOTE — PLAN OF CARE
Goal Outcome Evaluation:  Plan of Care Reviewed With: patient, spouse        Progress: no change  Outcome Evaluation: patient is observed ambulating with nursing staff pretreatment with no AD and contact guard to close supervision approximately 100 feet. patient has loss of balance with gait but self corrects by grabbing the wall. Patient is initially agreeable to physical therapy treatment. BP is elevated this morning. she utilizes a printed letter and number page to assist with conversation. after assessing blood pressure and discussing needs for home as she is being discharged today, patient further declines gait and transfers as well as LE exercises today. discussed the need and importance for strengthening and verbalizes understanding. all needs are met before exiting patient room.

## 2023-01-11 NOTE — DISCHARGE PLACEMENT REQUEST
"Patient is discharging home today.   New  order placed this AM,  Thanks, Sunny FOSTER RN,  [phone].    Ave Bernard (73 y.o. Female)     Date of Birth   1950    Social Security Number       Address   [address] [place], [place] [address]    Home Phone   [phone]    MRN   [id number]       Alevism   Adventism    Marital Status                               Admission Date   1/8/23    Admission Type   Emergency    Admitting Provider   Chaparro Ang MD    Attending Provider   Yani Garrison MD    Department, Room/Bed   [address], 358/1       Discharge Date       Discharge Disposition   Home-Health Care [facility] [place]    Discharge Destination                               Attending Provider: Yani Garrison MD    Allergies: Penicillins, Lisinopril    Isolation: None   Infection: COVID (rule out) (01/09/23)   Code Status: CPR    Ht: 148.6 cm (58.5\")   Wt: 78.6 kg (173 lb 4.8 oz)    Admission Cmt: None   Principal Problem: Dysarthria [R47.1]                 Active Insurance as of 1/8/2023     Primary Coverage     Payor Plan Insurance Group Employer/Plan Group    MEDICARE MEDICARE A & B      Payor Plan Address Payor Plan Phone Number Payor Plan Fax Number Effective Dates    [address] [phone]  1/1/2016 - None Entered    [facility] [id number]       Subscriber Name Subscriber Birth Date Member ID       AVE BERNARD 1950 [id number]           Secondary Coverage     Payor Plan Insurance Group Employer/Plan Group    [employer] [place] [employer] [place] [id number]     Payor Plan Address Payor Plan Phone Number Payor Plan Fax Number Effective Dates    [address] [place] [address] [phone]  1/1/2016 - None Entered    [place] [address]       Subscriber Name Subscriber Birth Date Member ID       AVE BERNARD 1950 [id number]                 Emergency Contacts      (Rel.) Home Phone Work Phone Mobile Phone    JamesKaveh (Spouse) " [phone] -- [phone]    jorden ramirez (Son) [phone] -- [phone]             [address] [address]  Phone:  [phone]  Fax:  [phone] Date: 2023      Ambulatory Referral to Home Health ([facility])     Patient:  Ave Ramirez MRN:  [id number]   [address] [facility] [address] :  1950  SSN:    Phone: [phone] Sex:  F      INSURANCE PAYOR PLAN GROUP # SUBSCRIBER ID   Primary:  Secondary:    MEDICARE  [employer] [place] [id number]  [id number]    [id number]  [id number]      Referring Provider Information:  DEEPA TAVERAS Phone: [phone] Fax: [phone]       Referral Information:   # Visits:  999 Referral Type: Home Health [42]   Urgency:  Routine Referral Reason: Specialty Services Required   Start Date: 2023 End Date:  To be determined by Insurer   Diagnosis: Dysarthria (R47.1 [ICD-10-CM] 784.51 [ICD-9-CM])  Cerebrovascular accident (CVA), unspecified mechanism (HCC) (I63.9 [ICD-10-CM] 434.91 [ICD-9-CM])      Refer to Dept:   Refer to Provider:   Refer to Provider Phone:   Refer to Facility:       Face to Face Visit Date: 2023  Follow-up provider for Plan of Care? I treated the patient in an acute care facility and will not continue treatment after discharge.  Follow-up provider: PITER MYRICK [[id number]]  Reason/Clinical Findings: post hosp  Describe mobility limitations that make leaving home difficult: post hospitalization  Nursing/Therapeutic Services Requested: Skilled Nursing  Nursing/Therapeutic Services Requested: Physical Therapy  Nursing/Therapeutic Services Requested: Occupational Therapy  Nursing/Therapeutic Services Requested: Speech Therapy  Skilled nursing orders: Medication education  Skilled nursing orders: Pain management  Skilled nursing orders: Mental health  PT orders: Therapeutic exercise  PT orders: Transfer training  PT orders: Gait  Training  PT orders: Strengthening  PT orders: Home safety assessment  Weight Bearing Status: As Tolerated  Occupational orders: Activities of daily living  Occupational orders: Energy conservation  Occupational orders: Strengthening  Occupational orders: Cognition  Frequency: 1 Week 1     This document serves as a request of services and does not constitute Insurance authorization or approval of services.  To determine eligibility, please contact the members Insurance carrier to verify and review coverage.     If you have medical questions regarding this request for services. Please contact [address] at [phone] during normal business hours.        Authorizing Provider:Chaparro Ang MD  Authorizing Provider's NPI: [id number]  Order Entered By: Chaparro Ang MD 1/11/2023  8:56 AM     Electronically signed by: Chaparro Ang MD 1/11/2023  8:56 AM

## 2023-01-11 NOTE — THERAPY TREATMENT NOTE
Acute Care - Speech Language Pathology Treatment Note  [facility]     Patient Name: Ave Ramirez  : 1950  MRN: [id number]  Today's Date: 2023               Admit Date: 2023     Visit Dx:    ICD-10-CM ICD-9-CM   1. Dysarthria  R47.1 784.51   2. Urinary tract infection with hematuria, site unspecified  N39.0 599.0    R31.9 599.70   3. Cerebrovascular accident (CVA) due to thrombosis of left middle cerebral artery (HCC)  I63.312 434.01   4. Other cerebrovascular vasospasm and vasoconstriction  I67.848 437.8   5. Impaired mobility and ADLs  Z74.09 V49.89    Z78.9    6. Impaired functional mobility, balance, gait, and endurance  Z74.09 V49.89   7. Atherosclerosis of native coronary artery of native heart, unspecified whether angina present  I25.10 414.01   8. History of PTCA  Z98.61 V45.82   9. Benign hypertension  I10 401.1   10. Suspected cerebrovascular accident (CVA)  R09.89 785.9   11. Cerebrovascular accident (CVA), unspecified mechanism ([facility])  I63.9 434.91     Patient Active Problem List   Diagnosis   • Hypothyroidism   • Atherosclerotic heart disease of native coronary artery without angina pectoris   • PVD (peripheral vascular disease) ([facility])   • Dyspnea on exertion   • CAD (coronary artery disease)   • Essential hypertension   • Palpitations   • Dizziness   • CVA (cerebral vascular accident) ([facility])   • Dysarthria     Past Medical History:   Diagnosis Date   • Acute angina ([facility])    • Acute bronchitis    • Acute sinusitis    • Anxiety    • Atherosclerotic heart disease of native coronary artery without angina pectoris    • Borderline glaucoma    • CAD (coronary artery disease)    • Cough    • Depression    • Disease of gallbladder     s/p lap jocelyne and normal ioc      • Encounter for gynecological examination (general) (routine) without abnormal findings    • Encounter for screening mammogram for malignant neoplasm of breast    • History of echocardiogram 2014    Normal LV systolic  funciton with Ef of 55% with diastolic relaxation abnormality of the left ventricle. Mild mitral regurgitation.   • Hyperlipidemia    • Hypertension    • Hypothyroidism    • Keratoconjunctivitis sicca (HCC)    • Myopia     refractive change OD    • Nausea and vomiting    • Nuclear cataract    • PVD (peripheral vascular disease) (HCC)    • SOB (shortness of breath)    • Upper respiratory infection      Past Surgical History:   Procedure Laterality Date   • CARDIAC CATHETERIZATION  2014    Medically manageable coronary artery disease in the previously placed stent in the diagonal coronary artery was patent with liminal irregularities. Normal LV systolic function with no wall motion abnormalities   • CARDIAC CATHETERIZATION     •  SECTION     • CHOLECYSTECTOMY  2012    intraoperative cholangiogram. Cholecystitis & cholelithiasis   • CORONARY STENT PLACEMENT      [facility]   • MAMMO BILATERAL  2016    DIAG MAMM, BILAT DIGITAL  (Medicare) (Other abnormal and inconclusive findings on diagnostic imaging of breast)    • MAMMO BILATERAL  2016    SCREENING MAMMOGRAPHY DIGITAL  (Medicare) (Encounter for screening mammogram for malignant neoplasm of breast)    • OTHER SURGICAL HISTORY  2003    OCT DISC [facility] 22936 (Borderline glaucoma)    • TONSILLECTOMY         SLP Recommendation and Plan  SLP Diagnosis: dysarthria, aphasia (23)           [facility] Criteria for Skilled Therapy Interventions Met: yes (23)           Predicted Duration Therapy Intervention (Days): until discharge (23)     Daily Summary of Progress (SLP): progress toward functional goals is good (23 08)           Treatment Assessment (SLP): dysarthria, aphasia (23 08)  Treatment Assessment Comments (SLP): Speech: Pt with slightly increased speech intelligibility. SLP focused on education for loud, over-articulation, and focusing on initial/final consonants. (23  0842)  Plan for Continued Treatment (SLP): continue treatment per plan of care (01/11/23 0842)  Plan of Care Reviewed With: patient (01/10/23 1257)  Progress: improving (01/10/23 1257)  Outcome Evaluation: Speech: Pt with decreased speech intelligbility due to dysarthria and aphasia. Pt's speech is very limited due to decreased over-articulation, decreased loudness, and decreased functional communication. SLP encouragd  pt to use communication board and/or alphabet. Pt to continue to benefit from skilled ST. (01/10/23 1257)      SLP EVALUATION (last 72 hours)     SLP SLC Evaluation     Row Name 01/11/23 0842 01/10/23 1010 01/09/23 1115             Communication Assessment/Intervention    Document Type therapy note (daily note)  1015  -EK evaluation  1015  -EK evaluation  1015  -EK      Subjective Information no complaints  -EK no complaints  -EK no complaints  -EK      Patient Observations alert;cooperative;agree to therapy  -EK alert;cooperative;agree to therapy  -EK alert;cooperative;agree to therapy  -EK      Patient/Family/Caregiver Comments/Observations --   -EK   -EK      Patient Effort good  -EK good  -EK good  -EK         General Information    Patient Profile Reviewed yes  -EK yes  -EK yes  -EK      Precautions/Limitations, Vision WFL with corrective lenses  -EK WFL with corrective lenses  -EK WFL with corrective lenses  -EK      Precautions/Limitations, Hearing WFL  -EK WFL  -EK WFL  -EK      Prior Level of Function-Communication WFL  -EK WFL  -EK WFL  -EK      Plans/Goals Discussed with spouse/S.O.;patient  -EK spouse/S.O.;patient  -EK spouse/S.O.;patient  -EK         Pain    Additional Documentation -- -- Pain Scale: Numbers Pre/Post-Treatment (Group)  -EK         Pain Scale: Numbers Pre/Post-Treatment    Pretreatment Pain Rating 0/10 - no pain  -EK 0/10 - no pain  -EK 0/10 - no pain  -EK      Posttreatment Pain Rating 0/10 - no pain  -EK 0/10 - no pain  -EK 0/10 - no pain  -EK          Comprehension Assessment/Intervention    Comprehension Assessment/Intervention -- -- Auditory Comprehension  -EK         Auditory Comprehension Assessment/Intervention    Auditory Comprehension (Communication) WFL  -EK WFL  -EK WFL  -EK      Able to Identify Objects/Pictures (Communication) WFL  -EK WFL  -EK WFL  -EK      Answers Questions (Communication) WFL  -EK WFL  -EK WFL  -EK      Able to Follow Commands (Communication) WFL  -EK WFL  -EK WFL  -EK         Expression Assessment/Intervention    Expression Assessment/Intervention -- -- verbal expression  -EK         Verbal Expression Assessment/Intervention    Automatic Speech (Communication) moderate impairment  -EK moderate impairment  -EK moderate impairment  -EK      Repetition moderate impairment  -EK moderate impairment  -EK moderate impairment  -EK      Conversational Discourse/Fluency moderate impairment  -EK moderate impairment  -EK moderate impairment  -EK         Oral Motor Structure and Function    Dentition Assessment upper dentures/partial in place;lower dentures/partial in place  -EK upper dentures/partial in place;lower dentures/partial in place  -EK upper dentures/partial in place;lower dentures/partial in place  -EK         Oral Musculature and Cranial Nerve Assessment    Oral Motor General Assessment generalized oral motor weakness  -EK generalized oral motor weakness  -EK generalized oral motor weakness  -EK         Motor Speech Assessment/Intervention    Motor Speech Function moderate impairment;severe impairment  -EK moderate impairment;severe impairment  -EK moderate impairment;severe impairment  -EK      Characteristics Consistent with Dysarthria slurred speech;decreased intensity  -EK slurred speech;decreased intensity  -EK slurred speech;decreased intensity  -EK      Initiation of Phonation (Communication) moderate impairment  -EK moderate impairment  -EK moderate impairment  -EK      Automatic Speech (Communication) moderate impairment   -EK moderate impairment  -EK moderate impairment  -EK      Verbal Repetition (Communication) moderate impairment  -EK moderate impairment  -EK moderate impairment  -EK         SLP Evaluation Clinical Impressions    SLP Diagnosis dysarthria;aphasia  -EK dysarthria;aphasia  -EK --      Rehab Potential/Prognosis good  -EK good  -EK --      SLC Criteria for Skilled Therapy Interventions Met yes  -EK yes  -EK --         SLP Treatment Clinical Impressions    Treatment Assessment (SLP) dysarthria;aphasia  -EK dysarthria;aphasia  -EK --      Treatment Assessment Comments (SLP) Speech: Pt with slightly increased speech intelligibility. SLP focused on education for loud, over-articulation, and focusing on initial/final consonants.  -EK Speech: Pt with decreased speech intelligbility due to dysarthria and aphasia. Pt's speech is very limited due to decreased over-articulation, decreased loudness, and decreased functional communication. SLP encouragd  pt to use communication board and/or alphabet.  -EK --      Daily Summary of Progress (SLP) progress toward functional goals is good  -EK progress toward functional goals is good  -EK --      Plan for Continued Treatment (SLP) continue treatment per plan of care  -EK continue treatment per plan of care  -EK --      Care Plan Review care plan/treatment goals reviewed  -EK evaluation/treatment results reviewed  -EK evaluation/treatment results reviewed  -EK         Recommendations    Therapy Frequency (SLP SLC) 3 days per week;5 days per week  -EK 3 days per week;5 days per week  -EK --      Predicted Duration Therapy Intervention (Days) until discharge  -EK until discharge  -EK --         Word Retrieval Skills Goal 1 (SLP)    Improve Word Retrieval Skills By Goal 1 (SLP) completing automatic speech task, counting;completing automatic speech task, alphabet;90%;with minimal cues (75-90%)  -EK completing automatic speech task, counting;completing automatic speech task,  alphabet;90%;with minimal cues (75-90%)  -EK completing automatic speech task, counting;completing automatic speech task, alphabet;90%;with minimal cues (75-90%)  -EK      Time Frame (Word Retrieval Goal 1, SLP) by discharge  -EK by discharge  -EK by discharge  -EK      Progress (Word Retrieval Skills Goal 1, SLP) 80%;with moderate cues (50-74%)  -EK 80%;with moderate cues (50-74%)  -EK --      Progress/Outcomes (Word Retrieval Goal 1, SLP) goal not met  -EK goal not met  -EK new goal  -EK         Word Retrieval Skills Goal 2 (SLP)    Improve Word Retrieval Skills By Goal 2 (SLP) repeating words;90%;with minimal cues (75-90%)  -EK repeating words;90%;with minimal cues (75-90%)  -EK repeating words;90%;with minimal cues (75-90%)  -EK      Time Frame (Word Retrieval Goal 2, SLP) by discharge  -EK by discharge  -EK by discharge  -EK      Progress (Word Retrieval Skills Goal 2, SLP) 80%;with moderate cues (50-74%)  -EK 80%;with moderate cues (50-74%)  -EK --      Progress/Outcomes (Word Retrieval Goal 2, SLP) goal not met  -EK goal not met  -EK new goal  -EK         Patient Will Implement Strategies Goal 1 (SLP)    Implement Strategies of Goal 1 (SLP) 90%;with minimal cues (75-90%);pointing to pictures  -EK 90%;with minimal cues (75-90%);pointing to pictures  -EK 90%;with minimal cues (75-90%);pointing to pictures  -EK      Time Frame (Strategy Implementation Goal 1, SLP) by discharge  -EK by discharge  -EK by discharge  -EK      Progress (Strategy Implementation Goal 1, SLP) 80%  -EK 50%  -EK --      Progress/Outcomes (Strategy Implementation Goal 1, SLP) goal not met  -EK goal not met  -EK new goal  -EK      Comment (Strategy Implementation Goal 1, SLP) SLP really focused on over-articulation, increased loudness, and focus on initial sound.  -EK SLP really focused on over-articulation, increased loudness, and focus on initial sound.  -EK --         Patient Will Implement Strategies Goal 2 (SLP)    Implement Strategies  of Goal 2 (SLP) using alphabet/word board;90%;with minimal cues (75-90%)  -EK using alphabet/word board;90%;with minimal cues (75-90%)  -EK using alphabet/word board;90%;with minimal cues (75-90%)  -EK      Time Frame (Strategy Implementation Goal 2, SLP) by discharge  -EK by discharge  -EK by discharge  -EK      Progress (Strategy Implementation Goal 2, SLP) -- 70%  -EK --      Progress/Outcomes (Strategy Implementation Goal 2, SLP) goal not met  -EK goal not met  -EK new goal  -EK         Articulation Goal 1 (SLP)    Improve Articulation Goal 1 (SLP) by over-articulating at phrase level;90%;with minimal cues (75-90%)  -EK by over-articulating at phrase level;90%;with minimal cues (75-90%)  -EK by over-articulating at phrase level;90%;with minimal cues (75-90%)  -EK      Time Frame (Articulation Goal 1, SLP) by discharge  -EK by discharge  -EK by discharge  -EK      Progress (Articulation Goal 1, SLP) 80%  -EK 70%  -EK --      Progress/Outcomes (Articulation Goal 1, SLP) goal not met  -EK goal not met  -EK new goal  -EK         Articulation Goal 2 (SLP)    Improve Articulation Goal 2 (SLP) of specific sounds in connected speech;by over-articulating at word level;90%;with minimal cues (75-90%)  -EK of specific sounds in connected speech;by over-articulating at word level;90%;with minimal cues (75-90%)  -EK of specific sounds in connected speech;by over-articulating at word level;90%;with minimal cues (75-90%)  -EK      Time Frame (Articulation Goal 2, SLP) by discharge  -EK by discharge  -EK by discharge  -EK      Progress (Articulation Goal 2, SLP) 80%  -EK 70%  -EK --      Progress/Outcomes (Articulation Goal 2, SLP) goal not met  -EK goal not met  -EK new goal  -EK            User Key  (r) = Recorded By, (t) = Taken By, (c) = Cosigned By    Initials Name Effective Dates    May Vital, CCC-SLP 06/16/21 -                    EDUCATION  The patient has been educated in the following areas:      Communication Impairment.           SLP GOALS     Row Name 01/11/23 0842 01/10/23 1010 01/09/23 1115       Word Retrieval Skills Goal 1 (SLP)    Improve Word Retrieval Skills By Goal 1 (SLP) completing automatic speech task, counting;completing automatic speech task, alphabet;90%;with minimal cues (75-90%)  -EK completing automatic speech task, counting;completing automatic speech task, alphabet;90%;with minimal cues (75-90%)  -EK completing automatic speech task, counting;completing automatic speech task, alphabet;90%;with minimal cues (75-90%)  -EK    Time Frame (Word Retrieval Goal 1, SLP) by discharge  -EK by discharge  -EK by discharge  -EK    Progress (Word Retrieval Skills Goal 1, SLP) 80%;with moderate cues (50-74%)  -EK 80%;with moderate cues (50-74%)  -EK --    Progress/Outcomes (Word Retrieval Goal 1, SLP) goal not met  -EK goal not met  -EK new goal  -EK       Word Retrieval Skills Goal 2 (SLP)    Improve Word Retrieval Skills By Goal 2 (SLP) repeating words;90%;with minimal cues (75-90%)  -EK repeating words;90%;with minimal cues (75-90%)  -EK repeating words;90%;with minimal cues (75-90%)  -EK    Time Frame (Word Retrieval Goal 2, SLP) by discharge  -EK by discharge  -EK by discharge  -EK    Progress (Word Retrieval Skills Goal 2, SLP) 80%;with moderate cues (50-74%)  -EK 80%;with moderate cues (50-74%)  -EK --    Progress/Outcomes (Word Retrieval Goal 2, SLP) goal not met  -EK goal not met  -EK new goal  -EK       Patient Will Implement Strategies Goal 1 (SLP)    Implement Strategies of Goal 1 (SLP) 90%;with minimal cues (75-90%);pointing to pictures  -EK 90%;with minimal cues (75-90%);pointing to pictures  -EK 90%;with minimal cues (75-90%);pointing to pictures  -EK    Time Frame (Strategy Implementation Goal 1, SLP) by discharge  -EK by discharge  -EK by discharge  -EK    Progress (Strategy Implementation Goal 1, SLP) 80%  -EK 50%  -EK --    Progress/Outcomes (Strategy Implementation Goal 1,  SLP) goal not met  -EK goal not met  -EK new goal  -EK    Comment (Strategy Implementation Goal 1, SLP) SLP really focused on over-articulation, increased loudness, and focus on initial sound.  -EK SLP really focused on over-articulation, increased loudness, and focus on initial sound.  -EK --       Patient Will Implement Strategies Goal 2 (SLP)    Implement Strategies of Goal 2 (SLP) using alphabet/word board;90%;with minimal cues (75-90%)  -EK using alphabet/word board;90%;with minimal cues (75-90%)  -EK using alphabet/word board;90%;with minimal cues (75-90%)  -EK    Time Frame (Strategy Implementation Goal 2, SLP) by discharge  -EK by discharge  -EK by discharge  -EK    Progress (Strategy Implementation Goal 2, SLP) -- 70%  -EK --    Progress/Outcomes (Strategy Implementation Goal 2, SLP) goal not met  -EK goal not met  -EK new goal  -EK       Articulation Goal 1 (SLP)    Improve Articulation Goal 1 (SLP) by over-articulating at phrase level;90%;with minimal cues (75-90%)  -EK by over-articulating at phrase level;90%;with minimal cues (75-90%)  -EK by over-articulating at phrase level;90%;with minimal cues (75-90%)  -EK    Time Frame (Articulation Goal 1, SLP) by discharge  -EK by discharge  -EK by discharge  -EK    Progress (Articulation Goal 1, SLP) 80%  -EK 70%  -EK --    Progress/Outcomes (Articulation Goal 1, SLP) goal not met  -EK goal not met  -EK new goal  -EK       Articulation Goal 2 (SLP)    Improve Articulation Goal 2 (SLP) of specific sounds in connected speech;by over-articulating at word level;90%;with minimal cues (75-90%)  -EK of specific sounds in connected speech;by over-articulating at word level;90%;with minimal cues (75-90%)  -EK of specific sounds in connected speech;by over-articulating at word level;90%;with minimal cues (75-90%)  -EK    Time Frame (Articulation Goal 2, SLP) by discharge  -EK by discharge  -EK by discharge  -EK    Progress (Articulation Goal 2, SLP) 80%  -EK 70%  -EK --     Progress/Outcomes (Articulation Goal 2, SLP) goal not met  -EK goal not met  -EK new goal  -EK          User Key  (r) = Recorded By, (t) = Taken By, (c) = Cosigned By    Initials Name Provider Type    May Vital CCC-SLP Speech and Language Pathologist                        Time Calculation:      Time Calculation- SLP     Row Name 01/11/23 0936             Time Calculation- SLP    SLP Start Time 0842  -EK      SLP Stop Time 0908  -EK      SLP Time Calculation (min) 26 min  -EK      Total Timed Code Minutes- SLP 26 minute(s)  -EK      SLP Received On 01/11/23  -EK            User Key  (r) = Recorded By, (t) = Taken By, (c) = Cosigned By    Initials Name Provider Type    May Vital CCC-SLP Speech and Language Pathologist                Therapy Charges for Today     Code Description Service Date Service Provider Modifiers Qty    58813630070 HC ST TREATMENT SPEECH 3 1/10/2023 May Solorzano CCC-SLP GN 1    [id number] HC ST TREATMENT SPEECH 2 1/11/2023 May Solorzano CCC-SLP GN 1                     CRISTIANE Lee  1/11/2023

## 2023-01-11 NOTE — THERAPY TREATMENT NOTE
Patient Name: Ave Ramirez  : 1950    MRN: [id number]                              Today's Date: 2023       Physical Therapy Treatment Note    Admit Date: 2023    Visit Dx:     ICD-10-CM ICD-9-CM   1. Dysarthria  R47.1 784.51   2. Urinary tract infection with hematuria, site unspecified  N39.0 599.0    R31.9 599.70   3. Cerebrovascular accident (CVA) due to thrombosis of left middle cerebral artery (HCC)  I63.312 434.01   4. Other cerebrovascular vasospasm and vasoconstriction  I67.848 437.8   5. Impaired mobility and ADLs  Z74.09 V49.89    Z78.9    6. Impaired functional mobility, balance, gait, and endurance  Z74.09 V49.89   7. Atherosclerosis of native coronary artery of native heart, unspecified whether angina present  I25.10 414.01   8. History of PTCA  Z98.61 V45.82   9. Benign hypertension  I10 401.1   10. Suspected cerebrovascular accident (CVA)  R09.89 785.9   11. Cerebrovascular accident (CVA), unspecified mechanism ([facility])  I63.9 434.91     Patient Active Problem List   Diagnosis   • Hypothyroidism   • Atherosclerotic heart disease of native coronary artery without angina pectoris   • PVD (peripheral vascular disease) ([facility])   • Dyspnea on exertion   • CAD (coronary artery disease)   • Essential hypertension   • Palpitations   • Dizziness   • CVA (cerebral vascular accident) ([facility])   • Dysarthria     Past Medical History:   Diagnosis Date   • Acute angina ([facility])    • Acute bronchitis    • Acute sinusitis    • Anxiety    • Atherosclerotic heart disease of native coronary artery without angina pectoris    • Borderline glaucoma    • CAD (coronary artery disease)    • Cough    • Depression    • Disease of gallbladder     s/p lap jocelyne and normal ioc      • Encounter for gynecological examination (general) (routine) without abnormal findings    • Encounter for screening mammogram for malignant neoplasm of breast    • History of echocardiogram 2014    Normal LV systolic funciton with Ef  of 55% with diastolic relaxation abnormality of the left ventricle. Mild mitral regurgitation.   • Hyperlipidemia    • Hypertension    • Hypothyroidism    • Keratoconjunctivitis sicca (HCC)    • Myopia     refractive change OD    • Nausea and vomiting    • Nuclear cataract    • PVD (peripheral vascular disease) (HCC)    • SOB (shortness of breath)    • Upper respiratory infection      Past Surgical History:   Procedure Laterality Date   • CARDIAC CATHETERIZATION  2014    Medically manageable coronary artery disease in the previously placed stent in the diagonal coronary artery was patent with liminal irregularities. Normal LV systolic function with no wall motion abnormalities   • CARDIAC CATHETERIZATION     •  SECTION     • CHOLECYSTECTOMY  2012    intraoperative cholangiogram. Cholecystitis & cholelithiasis   • CORONARY STENT PLACEMENT      [facility]   • MAMMO BILATERAL  2016    DIAG MAMM, BILAT DIGITAL  (Medicare) (Other abnormal and inconclusive findings on diagnostic imaging of breast)    • MAMMO BILATERAL  2016    SCREENING MAMMOGRAPHY DIGITAL  (Medicare) (Encounter for screening mammogram for malignant neoplasm of breast)    • OTHER SURGICAL HISTORY  2003    OCT DISC NFL 34313 (Borderline glaucoma)    • TONSILLECTOMY        General Information     Row Name 23          Physical Therapy Time and Intention    Document Type therapy note (daily note)  -     Mode of Treatment individual therapy;physical therapy  -     Row Name 23          General Information    Patient Profile Reviewed yes  -     Existing Precautions/Restrictions fall  -     Row Name 23          Cognition    Orientation Status (Cognition) oriented to;person;place  -     Row Name 23          Safety Issues, Functional Mobility    Impairments Affecting Function (Mobility) balance;endurance/activity tolerance;shortness of breath;strength  -            User Key  (r) = Recorded By, (t) = Taken By, (c) = Cosigned By    Initials Name Provider Type     Marley Sood PTA Physical Therapist Assistant               Mobility    No documentation.                Obj/Interventions    No documentation.                Goals/Plan     Row Name 01/11/23 0953          Bed Mobility Goal 1 (PT)    Activity/Assistive Device (Bed Mobility Goal 1, PT) bed mobility activities, all  -     [place] Level/Cues Needed (Bed Mobility Goal 1, PT) modified independence;independent  -     Time Frame (Bed Mobility Goal 1, PT) by discharge  -     Progress/Outcomes (Bed Mobility Goal 1, PT) goal ongoing  -     Row Name 01/11/23 0953          Transfer Goal 1 (PT)    Activity/Assistive Device (Transfer Goal 1, PT) bed-to-chair/chair-to-bed;toilet  -     [place] Level/Cues Needed (Transfer Goal 1, PT) modified independence  -     Time Frame (Transfer Goal 1, PT) by discharge  -     Progress/Outcome (Transfer Goal 1, PT) goal ongoing  -     Row Name 01/11/23 0953          Gait Training Goal 1 (PT)    Activity/Assistive Device (Gait Training Goal 1, PT) gait (walking locomotion);assistive device use  -     [place] Level (Gait Training Goal 1, PT) modified independence  -     Distance (Gait Training Goal 1, PT) 100 ft or more per trip w/ VSS and no SOA  -     Time Frame (Gait Training Goal 1, PT) by discharge  -     Progress/Outcome (Gait Training Goal 1, PT) goal not met  -     Row Name 01/11/23 0953          Stairs Goal 1 (PT)    Activity/Assistive Device (Stairs Goal 1, PT) ascending stairs;descending stairs  -     [place] Level/Cues Needed (Stairs Goal 1, PT) modified independence;supervision required  -     Number of Stairs (Stairs Goal 1, PT) 2  -     Time Frame (Stairs Goal 1, PT) by discharge  -     Progress/Outcome (Stairs Goal 1, PT) goal not met  -     Row Name 01/11/23 0953          Problem Specific Goal 1 (PT)    Problem Specific  Goal 1 (PT) pt will demo indep sit to stand x 3 as ther ex w/out LOB or SOA  -     Time Frame (Problem Specific Goal 1, PT) 1 week  -     Progress/Outcome (Problem Specific Goal 1, PT) goal not met  -           User Key  (r) = Recorded By, (t) = Taken By, (c) = Cosigned By    Initials Name Provider Type     Marley Sood PTA Physical Therapist Assistant               Clinical Impression     Row Name 01/11/23 0936          Pain    Pretreatment Pain Rating 8/10  -     Posttreatment Pain Rating 8/10  -     Pain Location - back;extremity  -     Row Name 01/11/23 0936          Plan of Care Review    Plan of Care Reviewed With patient;spouse  -     Progress no change  -     Outcome Evaluation patient is observed ambulating with nursing staff pretreatment with no AD and contact guard to close supervision approximately 100 feet. patient has loss of balance with gait but self corrects by grabbing the wall. Patient is initially agreeable to physical therapy treatment. BP is elevated this morning. she utilizes a printed letter and number page to assist with conversation. after assessing blood pressure and discussing needs for home as she is being discharged today, patient further declines gait and transfers as well as LE exercises today. discussed the need and importance for strengthening and verbalizes understanding. all needs are met before exiting patient room.  -     Row Name 01/11/23 0936          Therapy Assessment/Plan (PT)    Rehab Potential (PT) good, to achieve stated therapy goals  -     Criteria for Skilled Interventions Met (PT) yes  -     Row Name 01/11/23 0936          Vital Signs    Pre Systolic BP Rehab 189  -     Pre Treatment Diastolic BP 89  -     Pretreatment Heart Rate (beats/min) 82  -MH     Pre SpO2 (%) 94  -MH     O2 Delivery Pre Treatment room air  -     Pre Patient Position Sitting  -     Post Patient Position Sitting  -     Row Name 01/11/23 0936           Positioning and Restraints    Pre-Treatment Position in bed  -MH     Post Treatment Position bed  -MH     In Bed fowlers;call light within reach;encouraged to call for assist;exit alarm on;with family/caregiver  -           User Key  (r) = Recorded By, (t) = Taken By, (c) = Cosigned By    Initials Name Provider Type     Marley Sood PTA Physical Therapist Assistant               Outcome Measures     Row Name 01/11/23 0953 01/11/23 0754       How much help from another person do you currently need...    Turning from your back to your side while in flat bed without using bedrails? 3  -MH 3  -MS    Moving from lying on back to sitting on the side of a flat bed without bedrails? 3  -MH 3  -MS    Moving to and from a bed to a chair (including a wheelchair)? 3  -MH 3  -MS    Standing up from a chair using your arms (e.g., wheelchair, bedside chair)? 3  -MH 3  -MS    Climbing 3-5 steps with a railing? 1  -MH 1  -MS    To walk in hospital room? 3  -MH 2  -MS    AM-PAC 6 Clicks Score (PT) 16  -MH 15  -MS    Highest level of mobility 5 --> Static standing  -MH 4 --> Transferred to chair/commode  -MS          User Key  (r) = Recorded By, (t) = Taken By, (c) = Cosigned By    Initials Name Provider Type     Marley Sood PTA Physical Therapist Assistant    Avril Jennings RN Registered Nurse                             Physical Therapy Education     Title: PT OT SLP Therapies (Resolved)     Topic: Physical Therapy (Resolved)     Point: Mobility training (Resolved)     Learning Progress Summary           Patient Acceptance, E,D, VU,NR by  at 1/9/2023 1417    Comment: POC   Family Acceptance, E,D, VU,NR by  at 1/9/2023 1417    Comment: POC                   Point: Home exercise program (Resolved)     Learner Progress:  Not documented in this visit.          Point: Body mechanics (Resolved)     Learner Progress:  Not documented in this visit.          Point: Precautions (Resolved)     Learning Progress  Summary           Patient Acceptance, E,D, VU,NR by VINNY at 1/9/2023 1417    Comment: POC   Family Acceptance, E,D, VU,NR by VINNY at 1/9/2023 1417    Comment: POC                               User Key     Initials Effective Dates Name Provider Type Discipline     06/16/21 -  Violeta Su PT Physical Therapist PT              PT Recommendation and Plan     Plan of Care Reviewed With: patient, spouse  Progress: no change  Outcome Evaluation: patient is observed ambulating with nursing staff pretreatment with no AD and contact guard to close supervision approximately 100 feet. patient has loss of balance with gait but self corrects by grabbing the wall. Patient is initially agreeable to physical therapy treatment. BP is elevated this morning. she utilizes a printed letter and number page to assist with conversation. after assessing blood pressure and discussing needs for home as she is being discharged today, patient further declines gait and transfers as well as LE exercises today. discussed the need and importance for strengthening and verbalizes understanding. all needs are met before exiting patient room.     Time Calculation:    PT Charges     Row Name 01/11/23 0954             Time Calculation    Start Time 0925  -MH      Stop Time 0949  -MH      Time Calculation (min) 24 min  -MH         Time Calculation- PT    Total Timed Code Minutes- PT 24 minute(s)  -MH         Timed Charges    58824 - PT Therapeutic Activity Minutes 24  -MH         Total Minutes    Timed Charges Total Minutes 24  -MH       Total Minutes 24  -MH            User Key  (r) = Recorded By, (t) = Taken By, (c) = Cosigned By    Initials Name Provider Type     Marley Sood PTA Physical Therapist Assistant              Therapy Charges for Today     Code Description Service Date Service Provider Modifiers Qty    30873572562  PT THERAPEUTIC ACT EA 15 MIN 1/11/2023 Marley Sood PTA GP 2          PT G-Codes  AM-PAC 6 Clicks Score  (PT): 16  PT Discharge Summary  Anticipated Discharge Disposition (PT): home with 24/7 care, home with home health, home with outpatient therapy services    Marley Sood, PTA  1/11/2023

## 2023-01-12 NOTE — OUTREACH NOTE
Prep Survey    Flowsheet Row Responses   [facility] facility patient discharged from? [place]   Is LACE score < 7 ? No   Eligibility Readm Mgmt   Discharge diagnosis Dysarthria    Does the patient have one of the following disease processes/diagnoses(primary or secondary)? Other   Does the patient have Home health ordered? Yes   What is the Home health agency?  [facility]   Is there a DME ordered? No   Prep survey completed? Yes          DANIELA SUÁREZ - Registered Nurse

## 2023-01-13 LAB
BACTERIA SPEC AEROBE CULT: NORMAL
BACTERIA SPEC AEROBE CULT: NORMAL

## 2023-01-17 ENCOUNTER — READMISSION MANAGEMENT (OUTPATIENT)
Dept: CALL CENTER | Facility: HOSPITAL | Age: 73
End: 2023-01-17
Payer: MEDICARE

## 2023-01-17 NOTE — OUTREACH NOTE
Medical Week 1 Survey    Flowsheet Row Responses   [facility] facility patient discharged from? [place]   Does the patient have one of the following disease processes/diagnoses(primary or secondary)? Other   Week 1 attempt successful? No   Unsuccessful attempts Attempt 1          LESLY Flores Registered Nurse

## 2023-01-27 ENCOUNTER — READMISSION MANAGEMENT (OUTPATIENT)
Dept: CALL CENTER | Facility: HOSPITAL | Age: 73
End: 2023-01-27
Payer: MEDICARE

## 2023-01-27 NOTE — OUTREACH NOTE
Medical Week 3 Survey    Flowsheet Row Responses   [facility] [place] patient discharged from? [place]   Does the patient have one of the following disease processes/diagnoses(primary or secondary)? Other   Week 3 attempt successful? Yes   Call start time 1213   Call end time 1216   Comments regarding appointments Pt followed up with pcp on January 26, 2023.   Has the patient kept scheduled appointments due by today? Yes   Has home health visited the patient within 72 hours of discharge? Yes   What is the patient's perception of their health status since discharge? Improving   Week 3 Call Completed? Yes   Graduated Yes   Wrap up additional comments Spouse reports pt is improving. Pt did see pcp with poistive results. Pt has been cleared, no stroke noted. Pt and spouse have no questions.          PITER YIN - Registered Nurse